# Patient Record
Sex: FEMALE | Race: WHITE | Employment: OTHER | URBAN - METROPOLITAN AREA
[De-identification: names, ages, dates, MRNs, and addresses within clinical notes are randomized per-mention and may not be internally consistent; named-entity substitution may affect disease eponyms.]

---

## 2017-10-12 ENCOUNTER — APPOINTMENT (OUTPATIENT)
Dept: CT IMAGING | Facility: CLINIC | Age: 72
End: 2017-10-12
Attending: EMERGENCY MEDICINE
Payer: COMMERCIAL

## 2017-10-12 ENCOUNTER — HOSPITAL ENCOUNTER (OUTPATIENT)
Facility: CLINIC | Age: 72
Setting detail: OBSERVATION
Discharge: NURSING, REHAB OR LONG-TERM CARE | End: 2017-11-01
Attending: EMERGENCY MEDICINE | Admitting: HOSPITALIST
Payer: COMMERCIAL

## 2017-10-12 DIAGNOSIS — B37.2 CANDIDIASIS OF SKIN: ICD-10-CM

## 2017-10-12 DIAGNOSIS — I10 BENIGN ESSENTIAL HYPERTENSION: ICD-10-CM

## 2017-10-12 DIAGNOSIS — Z79.4 TYPE 2 DIABETES MELLITUS WITH DIABETIC NEPHROPATHY, WITH LONG-TERM CURRENT USE OF INSULIN (H): ICD-10-CM

## 2017-10-12 DIAGNOSIS — I48.0 PAROXYSMAL ATRIAL FIBRILLATION (H): Primary | ICD-10-CM

## 2017-10-12 DIAGNOSIS — S01.01XA LACERATION OF SCALP, INITIAL ENCOUNTER: ICD-10-CM

## 2017-10-12 DIAGNOSIS — S09.90XA CLOSED HEAD INJURY, INITIAL ENCOUNTER: ICD-10-CM

## 2017-10-12 DIAGNOSIS — R62.7 ADULT FAILURE TO THRIVE: ICD-10-CM

## 2017-10-12 DIAGNOSIS — R41.0 CONFUSION: ICD-10-CM

## 2017-10-12 DIAGNOSIS — E11.21 TYPE 2 DIABETES MELLITUS WITH DIABETIC NEPHROPATHY, WITH LONG-TERM CURRENT USE OF INSULIN (H): ICD-10-CM
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
## 2017-10-12 LAB
ALBUMIN UR-MCNC: >=300 MG/DL
AMORPH CRY #/AREA URNS HPF: ABNORMAL /HPF
ANION GAP SERPL CALCULATED.3IONS-SCNC: 10 MMOL/L (ref 3–14)
APPEARANCE UR: ABNORMAL
BASOPHILS # BLD AUTO: 0 10E9/L (ref 0–0.2)
BASOPHILS NFR BLD AUTO: 0.2 %
BILIRUB UR QL STRIP: NEGATIVE
BUN SERPL-MCNC: 20 MG/DL (ref 7–30)
CALCIUM SERPL-MCNC: 8.9 MG/DL (ref 8.5–10.1)
CHLORIDE SERPL-SCNC: 104 MMOL/L (ref 94–109)
CO2 SERPL-SCNC: 24 MMOL/L (ref 20–32)
COLOR UR AUTO: YELLOW
CREAT SERPL-MCNC: 1.42 MG/DL (ref 0.52–1.04)
DIFFERENTIAL METHOD BLD: NORMAL
EOSINOPHIL # BLD AUTO: 0.2 10E9/L (ref 0–0.7)
EOSINOPHIL NFR BLD AUTO: 1.9 %
ERYTHROCYTE [DISTWIDTH] IN BLOOD BY AUTOMATED COUNT: 14 % (ref 10–15)
ETHANOL SERPL-MCNC: <0.01 G/DL
GFR SERPL CREATININE-BSD FRML MDRD: 36 ML/MIN/1.7M2
GLUCOSE SERPL-MCNC: 341 MG/DL (ref 70–99)
GLUCOSE UR STRIP-MCNC: >=1000 MG/DL
HCT VFR BLD AUTO: 41.5 % (ref 35–47)
HGB BLD-MCNC: 14.6 G/DL (ref 11.7–15.7)
HGB UR QL STRIP: ABNORMAL
HYALINE CASTS #/AREA URNS LPF: ABNORMAL /LPF
IMM GRANULOCYTES # BLD: 0 10E9/L (ref 0–0.4)
IMM GRANULOCYTES NFR BLD: 0.3 %
INR PPP: 0.97 (ref 0.86–1.14)
INTERPRETATION ECG - MUSE: NORMAL
KETONES UR STRIP-MCNC: NEGATIVE MG/DL
LEUKOCYTE ESTERASE UR QL STRIP: NEGATIVE
LYMPHOCYTES # BLD AUTO: 2 10E9/L (ref 0.8–5.3)
LYMPHOCYTES NFR BLD AUTO: 20.7 %
MCH RBC QN AUTO: 30.3 PG (ref 26.5–33)
MCHC RBC AUTO-ENTMCNC: 35.2 G/DL (ref 31.5–36.5)
MCV RBC AUTO: 86 FL (ref 78–100)
MONOCYTES # BLD AUTO: 0.5 10E9/L (ref 0–1.3)
MONOCYTES NFR BLD AUTO: 5 %
NEUTROPHILS # BLD AUTO: 6.8 10E9/L (ref 1.6–8.3)
NEUTROPHILS NFR BLD AUTO: 71.9 %
NITRATE UR QL: NEGATIVE
NON-SQ EPI CELLS #/AREA URNS LPF: ABNORMAL /LPF
NRBC # BLD AUTO: 0 10*3/UL
NRBC BLD AUTO-RTO: 0 /100
PH UR STRIP: 6 PH (ref 5–7)
PLATELET # BLD AUTO: 270 10E9/L (ref 150–450)
POTASSIUM SERPL-SCNC: 3.6 MMOL/L (ref 3.4–5.3)
RBC # BLD AUTO: 4.82 10E12/L (ref 3.8–5.2)
RBC #/AREA URNS AUTO: ABNORMAL /HPF
SODIUM SERPL-SCNC: 138 MMOL/L (ref 133–144)
SOURCE: ABNORMAL
SP GR UR STRIP: 1.02 (ref 1–1.03)
UROBILINOGEN UR STRIP-ACNC: 0.2 EU/DL (ref 0.2–1)
WBC # BLD AUTO: 9.4 10E9/L (ref 4–11)
WBC #/AREA URNS AUTO: ABNORMAL /HPF

## 2017-10-12 PROCEDURE — 81001 URINALYSIS AUTO W/SCOPE: CPT | Mod: XU | Performed by: EMERGENCY MEDICINE

## 2017-10-12 PROCEDURE — 87186 SC STD MICRODIL/AGAR DIL: CPT | Performed by: EMERGENCY MEDICINE

## 2017-10-12 PROCEDURE — 94640 AIRWAY INHALATION TREATMENT: CPT

## 2017-10-12 PROCEDURE — 85025 COMPLETE CBC W/AUTO DIFF WBC: CPT | Performed by: EMERGENCY MEDICINE

## 2017-10-12 PROCEDURE — 96360 HYDRATION IV INFUSION INIT: CPT

## 2017-10-12 PROCEDURE — 87086 URINE CULTURE/COLONY COUNT: CPT | Performed by: EMERGENCY MEDICINE

## 2017-10-12 PROCEDURE — 93005 ELECTROCARDIOGRAM TRACING: CPT

## 2017-10-12 PROCEDURE — 25000128 H RX IP 250 OP 636: Performed by: EMERGENCY MEDICINE

## 2017-10-12 PROCEDURE — 99285 EMERGENCY DEPT VISIT HI MDM: CPT | Mod: 25

## 2017-10-12 PROCEDURE — 80320 DRUG SCREEN QUANTALCOHOLS: CPT | Performed by: EMERGENCY MEDICINE

## 2017-10-12 PROCEDURE — 85610 PROTHROMBIN TIME: CPT | Performed by: EMERGENCY MEDICINE

## 2017-10-12 PROCEDURE — 99220 ZZC INITIAL OBSERVATION CARE,LEVL III: CPT | Performed by: HOSPITALIST

## 2017-10-12 PROCEDURE — 80048 BASIC METABOLIC PNL TOTAL CA: CPT | Performed by: EMERGENCY MEDICINE

## 2017-10-12 PROCEDURE — G0378 HOSPITAL OBSERVATION PER HR: HCPCS

## 2017-10-12 PROCEDURE — 87088 URINE BACTERIA CULTURE: CPT | Performed by: EMERGENCY MEDICINE

## 2017-10-12 PROCEDURE — 96361 HYDRATE IV INFUSION ADD-ON: CPT

## 2017-10-12 PROCEDURE — 90471 IMMUNIZATION ADMIN: CPT

## 2017-10-12 PROCEDURE — 90715 TDAP VACCINE 7 YRS/> IM: CPT | Performed by: EMERGENCY MEDICINE

## 2017-10-12 PROCEDURE — 12004 RPR S/N/AX/GEN/TRK7.6-12.5CM: CPT

## 2017-10-12 PROCEDURE — 70450 CT HEAD/BRAIN W/O DYE: CPT

## 2017-10-12 RX ORDER — ONDANSETRON 4 MG/1
4 TABLET, ORALLY DISINTEGRATING ORAL EVERY 6 HOURS PRN
Status: DISCONTINUED | OUTPATIENT
Start: 2017-10-12 | End: 2017-11-01 | Stop reason: HOSPADM

## 2017-10-12 RX ORDER — DEXTROSE MONOHYDRATE 25 G/50ML
25-50 INJECTION, SOLUTION INTRAVENOUS
Status: DISCONTINUED | OUTPATIENT
Start: 2017-10-12 | End: 2017-11-01 | Stop reason: HOSPADM

## 2017-10-12 RX ORDER — METOPROLOL TARTRATE 25 MG/1
75 TABLET, FILM COATED ORAL 2 TIMES DAILY
Status: DISCONTINUED | OUTPATIENT
Start: 2017-10-12 | End: 2017-10-26

## 2017-10-12 RX ORDER — ACETAMINOPHEN 650 MG/1
650 SUPPOSITORY RECTAL EVERY 4 HOURS PRN
Status: DISCONTINUED | OUTPATIENT
Start: 2017-10-12 | End: 2017-11-01 | Stop reason: HOSPADM

## 2017-10-12 RX ORDER — NALOXONE HYDROCHLORIDE 0.4 MG/ML
.1-.4 INJECTION, SOLUTION INTRAMUSCULAR; INTRAVENOUS; SUBCUTANEOUS
Status: DISCONTINUED | OUTPATIENT
Start: 2017-10-12 | End: 2017-11-01 | Stop reason: HOSPADM

## 2017-10-12 RX ORDER — ONDANSETRON 2 MG/ML
4 INJECTION INTRAMUSCULAR; INTRAVENOUS EVERY 6 HOURS PRN
Status: DISCONTINUED | OUTPATIENT
Start: 2017-10-12 | End: 2017-11-01 | Stop reason: HOSPADM

## 2017-10-12 RX ORDER — LISINOPRIL 10 MG/1
10 TABLET ORAL 2 TIMES DAILY
Status: DISCONTINUED | OUTPATIENT
Start: 2017-10-12 | End: 2017-10-13

## 2017-10-12 RX ORDER — NICOTINE POLACRILEX 4 MG
15-30 LOZENGE BUCCAL
Status: DISCONTINUED | OUTPATIENT
Start: 2017-10-12 | End: 2017-11-01 | Stop reason: HOSPADM

## 2017-10-12 RX ORDER — BISACODYL 10 MG
10 SUPPOSITORY, RECTAL RECTAL DAILY PRN
Status: DISCONTINUED | OUTPATIENT
Start: 2017-10-12 | End: 2017-11-01 | Stop reason: HOSPADM

## 2017-10-12 RX ORDER — LABETALOL HYDROCHLORIDE 5 MG/ML
10 INJECTION, SOLUTION INTRAVENOUS
Status: DISCONTINUED | OUTPATIENT
Start: 2017-10-12 | End: 2017-11-01 | Stop reason: HOSPADM

## 2017-10-12 RX ORDER — HYDRALAZINE HYDROCHLORIDE 20 MG/ML
5 INJECTION INTRAMUSCULAR; INTRAVENOUS
Status: DISCONTINUED | OUTPATIENT
Start: 2017-10-12 | End: 2017-11-01 | Stop reason: HOSPADM

## 2017-10-12 RX ORDER — ACETAMINOPHEN 325 MG/1
650 TABLET ORAL EVERY 4 HOURS PRN
Status: DISCONTINUED | OUTPATIENT
Start: 2017-10-12 | End: 2017-11-01 | Stop reason: HOSPADM

## 2017-10-12 RX ORDER — AMOXICILLIN 250 MG
1-2 CAPSULE ORAL 2 TIMES DAILY PRN
Status: DISCONTINUED | OUTPATIENT
Start: 2017-10-12 | End: 2017-11-01 | Stop reason: HOSPADM

## 2017-10-12 RX ADMIN — SODIUM CHLORIDE 1000 ML: 9 INJECTION, SOLUTION INTRAVENOUS at 21:39

## 2017-10-12 RX ADMIN — CLOSTRIDIUM TETANI TOXOID ANTIGEN (FORMALDEHYDE INACTIVATED), CORYNEBACTERIUM DIPHTHERIAE TOXOID ANTIGEN (FORMALDEHYDE INACTIVATED), BORDETELLA PERTUSSIS TOXOID ANTIGEN (GLUTARALDEHYDE INACTIVATED), BORDETELLA PERTUSSIS FILAMENTOUS HEMAGGLUTININ ANTIGEN (FORMALDEHYDE INACTIVATED), BORDETELLA PERTUSSIS PERTACTIN ANTIGEN, AND BORDETELLA PERTUSSIS FIMBRIAE 2/3 ANTIGEN 0.5 ML: 5; 2; 2.5; 5; 3; 5 INJECTION, SUSPENSION INTRAMUSCULAR at 20:52

## 2017-10-12 ASSESSMENT — ENCOUNTER SYMPTOMS: WOUND: 1

## 2017-10-12 NOTE — ED PROVIDER NOTES
History     Chief Complaint:  Fall    HPI   Amber Hull is a 72 year old diabetic female who presents via EMS after a fall. Patient fell and hit the right side of her head on a couple hooks on her dresser after tripping on her dog. She sustained this laceration this morning and her friend found her this afternoon and called 911. EMS states she s argumentative and appears confused.  EMS states that there was dried feces all around her room.  She states she does not take medicine for her diabetes and that she does not take any of her medications.  She states she does not take her medications because she does not like doctors. Patient denies neck pain, trouble urinating, urinary frequency and dysuria. She states she is pain free.  She is incontinent of urine and wears depends.    Per her friend Dayami whom the patient lives with, Dayami does not feel the patient is safe living at home and that she needs placement in a care facility. Dayami states she has been working on this for a month and has been in contact with  but does not feel that she has made any progress. She reports that the patient's bed sheets are always covered in feces and that the patient tried to put a feces-covered comforter in the dryer the other day without putting it in the washing machine first. She also reports that the patient fell a couple of weeks ago also. She states that a friend of Julio C came to the home yesterday but said that she wouldn't enter because the smell of stool was so strong. The paramedics also reported upon her arrival that there was feces everywhere all around the patient's room.     Allergies:  No known drug allergies.     Medications (patient states she is not currently taking any medications, the following are listed in her medical record):   Metoprolol   Tylenol  Miralax  Eliquis  Lantus  Lisinopril  Atorvastatin     Past Medical History:    Diabetes  Endometrial hyperplasia  Endometriosis  "  Ganglion  Ovarian cyst  Septicemia due to group B streptococcus  Paroxysmal A-Fib  Hydronephrosis  HTN  HLD  Renal failure    Past Surgical History:    Cholecystectomy   Total hysterectomy  Colonoscopy  Bunion correction  D & C  Excise primary ganglion wrist  Carpal tunnel surgery  Tooth extraction  Lithotripsy with stent implant     Family History:    History reviewed. No pertinent family history.    Social History:  Marital Status:   Presents to the ED via EMS.  Tobacco Use: Never  Alcohol Use: No     Review of Systems   Unable to perform ROS: Mental status change   Skin: Positive for wound.       Physical Exam   First Vitals:  BP: (!) 151/92  Heart Rate: 103  Temp: 97.9  F (36.6  C)  Resp: 18  Height: 165.1 cm (5' 5\")  Weight: 90.7 kg (200 lb)  SpO2: 96 %       Physical Exam  Nursing note and vitals reviewed.  Constitutional:  Appears well-developed and well-nourished.  She has dried feces on her feet.  HENT:   Head:    Large 10 cm linear full thickness laceration extending vertically up the proximal forehead up the right parietal scalp.  The laceration extends to the skull, but the skull is intact.  Dried blood in the wound, but no active bleeding.  TM's cear.   Mouth/Throat:   Oropharynx is clear and moist. No oropharyngeal exudate.   Eyes:    Pupils are equal, round, and reactive to light.   Neck:    Normal range of motion of the neck without pain. Neck supple. Non tender midline over the cervical spines.  No stepoff     No tracheal deviation present. No thyromegaly present.   Cardiovascular:  Normal rate, regular rhythm, no murmur   Pulmonary/Chest: Breath sounds are clear and equal without wheezes or crackles.  Non tender ribs and clavicles.  Abdominal:   Soft. Bowel sounds are normal. Exhibits no distension and      no mass. There is no tenderness.      There is no rebound and no guarding.   Musculoskeletal:  Exhibits no edema.  Non tender arms, legs and back.  Lymphadenopathy:  No cervical " adenopathy.   Neurological:   Alert and oriented to person, place, but not time.  GCS 15.  CN 2-12 intact.  and proximal upper extremity strength strong and equal.  Bilateral lower extremity strength strong and equal, including strong dorsiflexion and plantarflexion strength.  Sensation intact and equal to the face, arms and legs.  No facial droop or weakness. Normal speech.  Follows commands and answers questions normally.    Skin:    Skin is warm and dry. No rash noted. No pallor.       Emergency Department Course     EKG:  @ 2147  Indication: Fall  Vent. Rate 85 bpm. LA interval 152 ms. QRS duration 76 ms. QT/QTc 398/473 ms. P-R-T axis 65 38 115.   Normal sinus rhythm. Nonspecific ST and T wave abnormality. Prolonged QT. Abnormal ECG.    Read @ 2154 by Dr. Chang.    Imaging:  Head CT, without contrast, per radiology:   IMPRESSION:   1. No intracranial hemorrhage or skull fractures are seen.  2. Age related changes including diffuse brain atrophy.  White matter  changes consistent with small vessel ischemic disease.    Radiographic findings were communicated with the patient who voiced understanding of the findings.    Laboratory:  CBC:  WBC 9.4, HGB 14.5, , otherwise WNL  BMP: Creatinine 1.2 (H), GFR 36 (L), glucose 341 (H), otherwise WNL  INR: 0.97  Alcohol blood level: <0.01  UA: Slightly cloudy yellow urine, urineglc >=1000, small blood, protein >= 300, otherwise WNL  UA, Microscopic: WBC 2-5, RBC 0-2 , many epithelial cells, moderate amorphous crystals     Procedures:     Laceration Repair      LACERATION:  A gaping, full thickness 10 cm laceration to the skull.     LOCATION:  Forehead extending into right parietal scalp.     FUNCTION:  Distally sensation and circulation are intact.    ANESTHESIA:  Local using 6 ccs of 1% lidocaine with epinephrine     PREPARATION:  Irrigation with Normal Saline.    DEBRIDEMENT:  no debridement.    CLOSURE:  Wound was closed with One Layer.  Skin closed with 5 x  5.0 Prolene using interrupted sutures to the forehead portion of the laceration.     Skin closed with 17 staples in the scalp portion of the laceration.     Interventions:  2052: Tdap, 0.5 mL, IM injection  2139: Normal Saline, 1 liter, IV bolus     Emergency Department Course:  Nursing notes and vitals reviewed.  I performed an exam of the patient as documented above.  The above workup was undertaken.  2019: I rechecked the patient and discussed results.   2050: I performed the laceration; see procedure note above.   Findings and plan explained to the patient's friend and caretaker who consents to admission.   2210: I discussed the patient with Dr. Peres of the hospitalist service, who will admit the patient to an obs bed for further monitoring, evaluation, and treatment..     Impression & Plan      Medical Decision Making:  This patient arrives with a closed head injury with a large full thickness scalp laceration which I repaired with sutures and staples. Head CT did not show any intercranial bleed or skull fracture. There is no sign of active infection or significant blood loss. However, the patient has been having progressive confusion which is possibly due to undiagnosed dementia. Her friend Dayami who she lives with does not feel safe for her to go home. Patient has been living in feces all around her room and paramedics state that they also feel she is in an unsafe living environment. Patient was initially resistant and wanting to go home but ultimately agreed to admission. She will likely need care facility placement and  consult. She has not been taking any of her medications for some time. She will be admitted under the care of Dr. Peres of the hospitalist service.     Diagnosis:    ICD-10-CM    1. Closed head injury, initial encounter S09.90XA    2. Laceration of scalp, initial encounter S01.01XA    3. Confusion R41.0    4. Adult failure to thrive R62.7        Disposition:  Admitted to  observation.         I, Meron Meraz, am serving as a scribe on 10/12/2017 at 6:40 PM to personally document services performed by Dr. Chang based on my observations and the provider's statements to me.    EMERGENCY DEPARTMENT       Xuan Chang MD  10/12/17 6552

## 2017-10-12 NOTE — IP AVS SNAPSHOT
` ` Patient Information     Patient Name Sex     Kurtis Amber ARMINDA ([id number]) Female 1945       Room Bed    88 [id number]      Patient Demographics     Address Phone    [address] [place] [address] [phone] (Home)  none (Work)  [phone] (Mobile)      Patient Ethnicity & Race     Ethnic Group Patient Race    American White      Emergency Contact(s)     Name Relation Home Work Mobile    Ludmila Borges  HC POA Other [phone]      Dayami Muñoz  Friend [phone]     Tasia Olivares Friend [phone] none none      Documents on File        Status Date Received Description       Documents for the Patient    Privacy Notice - [place] Received 16     Face Sheet  10/31/07     Insurance Card       Power of  Not Received  *-* Power of  *-*    Advance Directives and Living Will Not Received  *-*  Living Will *-*    Consent for EHR Access Received 16     External Medication Information Consent       Patient ID       [facility] Specified Other       Consent for Services/Privacy Notice - Hospital/Clinic Received () 16     HIM JEFFERSON Authorization  16     Consent for Services/Privacy Notice - Hospital/Clinic Received 10/13/17     Care Everywhere Prospective Auth          Documents for the Encounter    CMS IM for Patient Signature       EMS/Ambulance Record  10/12/17 [place] FIRE DEPARTMENT    CMS MATUTE for Patient Signature Received 10/14/17 unable to do, see note      Admission Information     Attending Provider Admitting Provider Admission Type Admission Date/Time    Inessa Barton MD Kharel, Tirtha R, MD Emergency 10/12/17  1825    Discharge Date Hospital Service Auth/Cert Status Service Area     Hospitalist [facility]    Unit Room/Bed Admission Status       [address] 88/88- Admission (Confirmed)       Admission     Complaint    Fall      Hospital Account     Name Acct ID Class Status Primary Coverage    Amber Hull ARMINDA  [id number] Observation Open HUMANA - HU[employer]NA MEDICARE ADVANTAGE            Guarantor Account (for Hospital Account #[id number])     Name Relation to Pt Service Area Active? Acct Type    Amber Hull  FCS Yes Personal/Family    Address Phone          [address] [place] [address] [phone](H)  none(O)              Coverage Information (for Hospital Account #[id number])     F/O Payor/Plan Precert #    HUMANA/HUMANA MEDICARE ADVANTAGE     Subscriber Subscriber #    Amber Hull [id number]    Address Phone    [address] [address]  [place] [address]

## 2017-10-12 NOTE — IP AVS SNAPSHOT
"[place] [address] ONCOLOGY: [phone]                                              INTERAGENCY TRANSFER FORM - PHYSICIAN ORDERS   10/12/2017                    Hospital Admission Date: 10/12/2017  ELSA BISHOP   : 1945  Sex: Female        Attending Provider: Inessa Barton MD     Allergies:  No Known Allergies    Infection:  None   Service:  HOSPITALIST    Ht:  1.651 m (5' 5\")   Wt:  94.1 kg (207 lb 6.4 oz)   Admission Wt:  90.7 kg (200 lb)    BMI:  34.51 kg/m 2   BSA:  2.08 m 2            Patient PCP Information     Provider PCP Type    [facility]    Tirso LIGIA Randle, OD, OD Ophthalmology      ED Clinical Impression     Diagnosis Description Comment Added By Time Added    Closed head injury, initial encounter [S09.90XA] Closed head injury, initial encounter [S09.90XA]  Xuan Chang MD 10/12/2017 10:04 PM    Laceration of scalp, initial encounter [S01.01XA] Laceration of scalp, initial encounter [S01.01XA]  Xuan Chang MD 10/12/2017 10:04 PM    Confusion [R41.0] Confusion [R41.0]  Xuan Chang MD 10/12/2017 10:04 PM    Adult failure to thrive [R62.7] Adult failure to thrive [R62.7]  Xuan Chang MD 10/12/2017 10:04 PM      Hospital Problems as of 2017              Priority Class Noted POA    Fall Medium  10/12/2017 Yes      Non-Hospital Problems as of 2017              Priority Class Noted    Pain in joint, multiple sites Medium  2007    Obesity Medium  2007    Type 2 diabetes mellitus with diabetic nephropathy (H) Medium  2016    Benign essential hypertension Medium  2016    Mixed hyperlipidemia Medium  2016    Paroxysmal atrial fibrillation (H) Medium  2016    Ureteral stone Medium  2016    Hydronephrosis, unspecified hydronephrosis type Medium  2016    Acute kidney failure (H) Medium  2016    Ureteral calculus Medium  2016    SIRS (systemic inflammatory response syndrome) (H) Medium  " 5/28/2016    Septicemia due to group B Streptococcus (H) High  5/29/2016      Code Status History     Date Active Date Inactive Code Status Order ID Comments User Context    11/1/2017  8:37 AM  Full Code [id number]  Inessa Barton MD Outpatient    10/12/2017 11:32 PM 11/1/2017  8:37 AM Full Code [id number]  Rj Peres MD ED    6/1/2016  1:05 PM 10/12/2017 11:32 PM DNR [id number]  Dg Collazo MD Outpatient    5/28/2016  1:57 PM 6/1/2016  1:05 PM DNR [id number]  Jc Cowan MD Inpatient         Medication Review      START taking        Dose / Directions Comments    amLODIPine 10 MG tablet   Commonly known as:  NORVASC   Used for:  Benign essential hypertension        Dose:  10 mg   Take 1 tablet (10 mg) by mouth daily   Quantity:  30 tablet   Refills:  0        aspirin 325 MG EC tablet   Used for:  Paroxysmal atrial fibrillation (H)        Dose:  325 mg   Take 1 tablet (325 mg) by mouth daily   Quantity:  40 tablet   Refills:  0        guanFACINE 2 MG tablet   Commonly known as:  TENEX   Used for:  Benign essential hypertension        Dose:  2 mg   Take 1 tablet (2 mg) by mouth daily   Refills:  0        hydrALAZINE 10 MG tablet   Commonly known as:  APRESOLINE   Used for:  Benign essential hypertension        Dose:  10 mg   Take 1 tablet (10 mg) by mouth 3 times daily   Quantity:  60 tablet   Refills:  0        hydrochlorothiazide 12.5 MG capsule   Commonly known as:  MICROZIDE   Used for:  Benign essential hypertension        Dose:  12.5 mg   Take 1 capsule (12.5 mg) by mouth daily   Quantity:  30 capsule   Refills:  0        * insulin aspart 100 UNIT/ML injection   Commonly known as:  NovoLOG PEN   Used for:  Type 2 diabetes mellitus with diabetic nephropathy, with long-term current use of insulin (H)        Dose:  9 Units   Inject 9 Units Subcutaneous 3 times daily (with meals)   Refills:  0        * insulin aspart 100 UNIT/ML injection   Commonly known as:  NovoLOG PEN   Used for:  Type  2 diabetes mellitus with diabetic nephropathy, with long-term current use of insulin (H)        Dose:  1-7 Units   Inject 1-7 Units Subcutaneous 3 times daily (before meals)   Refills:  0        miconazole 2 % powder   Commonly known as:  MICATIN; MICRO GUARD   Used for:  Candidiasis of skin        Apply topically every hour as needed for other (topical candidiasis)   Refills:  0        * Notice:  This list has 2 medication(s) that are the same as other medications prescribed for you. Read the directions carefully, and ask your doctor or other care provider to review them with you.      CONTINUE these medications which may have CHANGED, or have new prescriptions. If we are uncertain of the size of tablets/capsules you have at home, strength may be listed as something that might have changed.        Dose / Directions Comments    insulin glargine 100 UNIT/ML injection   Commonly known as:  LANTUS   This may have changed:    - how much to take  - when to take this  - additional instructions   Used for:  Type 2 diabetes mellitus with diabetic nephropathy, with long-term current use of insulin (H)        Dose:  37 Units   Inject 37 Units Subcutaneous At Bedtime   Refills:  0          CONTINUE these medications which have NOT CHANGED        Dose / Directions Comments    acetaminophen 325 MG tablet   Commonly known as:  TYLENOL        Dose:  650 mg   Take 650 mg by mouth 3 times daily as needed for mild pain   Refills:  0        ATORVASTATIN CALCIUM PO        Dose:  10 mg   Take 10 mg by mouth daily   Refills:  0        polyethylene glycol powder   Commonly known as:  MIRALAX/GLYCOLAX        Dose:  17 g   Take 17 g by mouth daily as needed for constipation   Refills:  0          STOP taking     apixaban ANTICOAGULANT 5 MG tablet   Commonly known as:  ELIQUIS           LISINOPRIL PO           Metoprolol Tartrate 75 MG Tabs                   Summary of Visit     Reason for your hospital stay       You were admitted to the  hospital secondary to confusion.             After Care     Activity - Up ad angie           Additional Discharge Instructions       yopur blood pressure medications are changed . You are not taking lisinopril and metoprolol any more.  You are discharged on Norvasc, HCTZ and hydralazine along with Bumex .  Your Lantus is increased to 37 units from 30 units.  You are off the Apixaban and will be taking full dose ASA for atrial fibrillation .       Advance Diet as Tolerated       Follow this diet upon discharge: Orders Placed This Encounter      Room Service      Moderate Consistent CHO Diet       Fall precautions           General info for SNF       Length of Stay Estimate: Long Term Care  Condition at Discharge: Improving  Level of care:skilled   Rehabilitation Potential: Good  Admission H&P remains valid and up-to-date: Yes  Recent Chemotherapy: N/A  Use Nursing Home Standing Orders: Yes       Glucose monitor nursing POCT       Before meals and at bedtime       Mantoux instructions       Give two-step Mantoux (PPD) Per Facility Policy Yes             Referrals     Physical Therapy Adult Consult       Evaluate and treat as clinically indicated.    Reason: debilitation and balance issues , risk of fall.             Statement of Approval     Ordered          11/01/17 0837  I have reviewed and agree with all the recommendations and orders detailed in this document.  EFFECTIVE NOW     Approved and electronically signed by:  Inessa Barton MD

## 2017-10-12 NOTE — IP AVS SNAPSHOT
` [place] [id number] ONCOLOGY: [phone]            Medication Administration Report for Amber Hull as of 11/01/17 1231   Legend:    Given Hold Not Given Due Canceled Entry Other Actions    Time Time (Time) Time  Time-Action       Inactive    Active    Linked        Medications 10/26/17 10/27/17 10/28/17 10/29/17 10/30/17 10/31/17 11/01/17    acetaminophen (TYLENOL) Suppository 650 mg  Dose: 650 mg Freq: EVERY 4 HOURS PRN Route: RE  PRN Reason: mild pain  Start: 10/12/17 2332   Admin Instructions: Alternate ibuprofen (if ordered) with acetaminophen.  Maximum acetaminophen dose from all sources = 75 mg/kg/day not to exceed 4 grams/day.               acetaminophen (TYLENOL) tablet 650 mg  Dose: 650 mg Freq: EVERY 4 HOURS PRN Route: PO  PRN Reason: mild pain  Start: 10/12/17 2332   Admin Instructions: Alternate ibuprofen (if ordered) with acetaminophen.  Maximum acetaminophen dose from all sources = 75 mg/kg/day not to exceed 4 grams/day.               amLODIPine (NORVASC) tablet 10 mg  Dose: 10 mg Freq: DAILY Route: PO  Start: 10/21/17 0900   Admin Instructions: Hold for SBP < 100     0817 (10 mg)-Given        0808 (10 mg)-Given        0820 (10 mg)-Given        0815 (10 mg)-Given        0821 (10 mg)-Given        0800 (10 mg)-Given        0823 (10 mg)-Given           aspirin EC EC tablet 325 mg  Dose: 325 mg Freq: DAILY Route: PO  Start: 10/17/17 1130   Admin Instructions: DO NOT CRUSH.     0817 (325 mg)-Given        0808 (325 mg)-Given        0820 (325 mg)-Given        0815 (325 mg)-Given        0821 (325 mg)-Given        0800 (325 mg)-Given        0822 (325 mg)-Given           bisacodyl (DULCOLAX) Suppository 10 mg  Dose: 10 mg Freq: DAILY PRN Route: RE  PRN Reason: constipation  Start: 10/12/17 2332   Admin Instructions: Hold for loose stools.  This is the third step of a three step constipation treatment protocol.               cloNIDine (CATAPRES) tablet 0.1 mg  Dose: 0.1 mg Freq: EVERY 4 HOURS  PRN Route: PO  PRN Comment: SBP > 180; DBP >105  Start: 10/16/17 2224              glucose 40 % gel 15-30 g  Dose: 15-30 g Freq: EVERY 15 MIN PRN Route: PO  PRN Reason: low blood sugar  Start: 10/12/17 2332   Admin Instructions: Give 15 g for BG 51 to 69 mg/dL IF patient is conscious and able to swallow. Give 30 g for BG less than or equal to 50 mg/dL IF patient is conscious and able to swallow. Do NOT give glucose gel via enteral tube.  IF patient has enteral tube: give apple juice 120 mL (4 oz or 15 g of CHO) via enteral tube for BG 51 to 69 mg/dL.  Give apple juice 240 mL (8 oz or 30 g of CHO) via enteral tube for BG less than or equal to 50 mg/dL.    ~Oral gel is preferable for conscious and able to swallow patient.   ~IF gel unavailable or patient refuses may provide apple juice 120 mL (4 oz or 15 g of CHO). Document juice on I and O flowsheet.              Or  dextrose 50 % injection 25-50 mL  Dose: 25-50 mL Freq: EVERY 15 MIN PRN Route: IV  PRN Reason: low blood sugar  Start: 10/12/17 2332   Admin Instructions: Use if have IV access, BG less than 70 mg/dL and meet dose criteria below:  Dose if conscious and alert (or disorientated) and NPO = 25 mL  Dose if unconscious / not alert = 50 mL  Vesicant.              Or  glucagon injection 1 mg  Dose: 1 mg Freq: EVERY 15 MIN PRN Route: SC  PRN Reason: low blood sugar  PRN Comment: May repeat x 1 only  Start: 10/12/17 2332   Admin Instructions: May give SQ or IM. ONLY use glucagon IF patient has NO IV access AND is UNABLE to swallow AND blood glucose is LESS than or EQUAL to 50 mg/dL.               guanFACINE (TENEX) tablet 2 mg  Dose: 2 mg Freq: DAILY Route: PO  Start: 10/26/17 0900    0817 (2 mg)-Given        0808 (2 mg)-Given        0820 (2 mg)-Given        0815 (2 mg)-Given        0820 (2 mg)-Given        0800 (2 mg)-Given        0822 (2 mg)-Given           hydrALAZINE (APRESOLINE) injection 5 mg  Dose: 5 mg Freq: EVERY 2 HOURS PRN Route: IV  PRN Reason: high  blood pressure  PRN Comment: give for SBP > 180 or DBP>100  Start: 10/12/17 2332              hydrALAZINE (APRESOLINE) tablet 10 mg  Dose: 10 mg Freq: 3 TIMES DAILY Route: PO  Start: 10/30/17 1600   Admin Instructions: Hold for systolic BP less than 120         1604 (10 mg)-Given       2104 (10 mg)-Given        0800 (10 mg)-Given       1639 (10 mg)-Given       2221 (10 mg)-Given        0823 (10 mg)-Given       [ ] 1600       [ ] 2200           hydrochlorothiazide (MICROZIDE) capsule 12.5 mg  Dose: 12.5 mg Freq: DAILY Route: PO  Start: 10/28/17 0900   Admin Instructions: Hold for sbp<100       0820 (12.5 mg)-Given        0815 (12.5 mg)-Given        0821 (12.5 mg)-Given        0800 (12.5 mg)-Given        0823 (12.5 mg)-Given           insulin aspart (NovoLOG) inj (RAPID ACTING)  Dose: 9 Units Freq: 3 TIMES DAILY WITH MEALS Route: SC  Start: 10/29/17 1200   Admin Instructions: If given at mealtime, must be administered 5 min before meal or immediately after.        1159 (9 Units)-Given       1740 (9 Units)-Given        0822 (9 Units)-Given       1246 (9 Units)-Given       1740 (9 Units)-Given        0853 (9 Units)-Given       1259 (9 Units)-Given       1741 (9 Units)-Given        0824 (9 Units)-Given       1210 (9 Units)-Given [C]       [ ] 1800           insulin aspart (NovoLOG) inj (RAPID ACTING)  Dose: 1-5 Units Freq: AT BEDTIME Route: SC  Start: 10/16/17 2200   Admin Instructions: MEDIUM INSULIN RESISTANCE DOSING    Do Not give Bedtime Correction Insulin if BG less than  200.   For  - 249 give 1 units.   For  - 299 give 2 units.   For  - 349 give 3 units.   For  -399 give 4 units.   For BG greater than or equal to 400 give 5 units.  Notify provider if glucose greater than or equal to 350 mg/dL after administration of correction dose.  If given at mealtime, must be administered 5 min before meal or immediately after.     (2159)-Not Given        (2128)-Not Given        2233 (1 Units)-Given         2152 (1 Units)-Given        2104 (1 Units)-Given [C]        (2334)-Not Given        [ ] 2200           insulin aspart (NovoLOG) inj (RAPID ACTING)  Dose: 1-7 Units Freq: 3 TIMES DAILY BEFORE MEALS Route: SC  Start: 10/16/17 1200   Admin Instructions: Correction Scale - MEDIUM INSULIN RESISTANCE DOSING     Do Not give Correction Insulin if Pre-Meal BG less than 140.   For Pre-Meal  - 189 give 1 unit.   For Pre-Meal  - 239 give 2 units.   For Pre-Meal  - 289 give 3 units.   For Pre-Meal  - 339 give 4 units.   For Pre-Meal - 399 give 5 units.   For Pre-Meal -449 give 6 units  For Pre-Meal BG greater than or equal to 450 give 7 units.   To be given with prandial insulin, and based on pre-meal blood glucose.    Notify provider if glucose greater than or equal to 350 mg/dL after administration of correction dose.  If given at mealtime, must be administered 5 min before meal or immediately after.     0818 (1 Units)-Given       1227 (2 Units)-Given       1746 (2 Units)-Given        (0807)-Not Given       1252 (2 Units)-Given       1740 (2 Units)-Given        (0811)-Not Given       1233 (3 Units)-Given       1730 (2 Units)-Given        (0746)-Not Given [C]       1159 (1 Units)-Given [C]       (1731)-Not Given [C]        (0817)-Not Given [C]       1246 (1 Units)-Given [C]       1741 (1 Units)-Given [C]        (0800)-Not Given [C]       1259 (2 Units)-Given [C]       1742 (1 Units)-Given        (0819)-Not Given [C]       1211 (1 Units)-Given       [ ] 1700           insulin glargine (LANTUS) injection 37 Units  Dose: 37 Units Freq: AT BEDTIME Route: SC  Start: 10/26/17 2200    2154 (37 Units)-Given        2127 (37 Units)-Given        2233 (37 Units)-Given        2152 (37 Units)-Given        2104 (37 Units)-Given        2221 (37 Units)-Given        [ ] 2200           labetalol (NORMODYNE/TRANDATE) injection 10 mg  Dose: 10 mg Freq: EVERY 2 HOURS PRN Route: IV  PRN Reason: high blood  pressure  PRN Comment: give for SBP > 170 or DBP>95  Start: 10/12/17 2332   Admin Instructions: Hold for HR < 60               miconazole (MICATIN; MICRO GUARD) 2 % powder  Freq: EVERY 1 HOUR PRN Route: Top  PRN Reason: other  PRN Comment: topical candidiasis  Start: 10/15/17 1430   Admin Instructions: Apply to affected area.        1314 ( )-Given       1926 ( )-Given        0535 ( )-Given       2233 ( )-Given        0644 ( )-Given        1101 ( )-Given       2110 ( )-Given        0854 ( )-Given            naloxone (NARCAN) injection 0.1-0.4 mg  Dose: 0.1-0.4 mg Freq: EVERY 2 MIN PRN Route: IV  PRN Reason: opioid reversal  Start: 10/12/17 2332   Admin Instructions: For respiratory rate LESS than or EQUAL to 8.  Partial reversal dose:  0.1 mg titrated q 2 minutes for Analgesia Side Effects Monitoring Sedation Level of 3 (frequently drowsy, arousable, drifts to sleep during conversation).Full reversal dose:  0.4 mg bolus for Analgesia Side Effects Monitoring Sedation Level of 4 (somnolent, minimal or no response to stimulation).               OLANZapine (zyPREXA) injection 10 mg  Dose: 10 mg Freq: EVERY 4 HOURS PRN Route: IM  PRN Reasons: agitation,aggression  Start: 10/13/17 1336   Admin Instructions: Dissolve the contents of the 10 mg vial using 2.1 mL of Sterile Water for Injection to provide a solution containing 5 mg/mL of olanzapine. Withdraw the ordered dose from vial. Use immediately (within 1 hour) after reconstitution. Discard any unused portion.               OLANZapine zydis (zyPREXA) ODT tab 10 mg  Dose: 10 mg Freq: EVERY 4 HOURS PRN Route: PO  PRN Reasons: agitation,aggression  Start: 10/13/17 1335   Admin Instructions: Combined IM and PO doses may significantly increase the risk of orthostatic hypotension at 30 mg per day or higher.  With dry hands, peel back foil backing and gently remove tablet; do not push oral disintegrating tablet through foil backing; administer immediately on tongue and oral  "disintegrating tablet dissolves in seconds; then swallow with saliva; liquid not required.               ondansetron (ZOFRAN-ODT) ODT tab 4 mg  Dose: 4 mg Freq: EVERY 6 HOURS PRN Route: PO  PRN Reasons: nausea,vomiting  Start: 10/12/17 2332   Admin Instructions: This is Step 1 of nausea and vomiting management.  If nausea not resolved in 15 minutes, go to Step 2 prochlorperazine (COMPAZINE). Do not push through foil backing. Peel back foil and gently remove. Place on tongue immediately. Administration with liquid unnecessary              Or  ondansetron (ZOFRAN) injection 4 mg  Dose: 4 mg Freq: EVERY 6 HOURS PRN Route: IV  PRN Reasons: nausea,vomiting  Start: 10/12/17 2332   Admin Instructions: This is Step 1 of nausea and vomiting management.  If nausea not resolved in 15 minutes, go to Step 2 prochlorperazine (COMPAZINE).  Irritant.               QUEtiapine (SEROquel) tablet 50 mg  Dose: 50 mg Freq: EVERY 2 HOURS PRN Route: PO  PRN Comment: Agitation  Start: 10/13/17 1334              senna-docusate (SENOKOT-S;PERICOLACE) 8.6-50 MG per tablet 1-2 tablet  Dose: 1-2 tablet Freq: 2 TIMES DAILY PRN Route: PO  PRN Comment: constipation   Start: 10/12/17 2332   Admin Instructions: If no bowel movement in 24 hours, increase to 2 tablets PO BID.  Hold for loose stools.   This is the first step of a three step constipation treatment protocol.               traMADol (ULTRAM) half-tab 25 mg  Dose: 25 mg Freq: EVERY 6 HOURS PRN Route: PO  PRN Reason: moderate pain  Start: 10/12/17 2332             Discontinued Medications  Medications 10/26/17 10/27/17 10/28/17 10/29/17 10/30/17 10/31/17 11/01/17         Freq: EVERY 1 HOUR PRN Route: Top  PRN Reason: pain  PRN Comment: with VAD insertion or accessing implanted port.  Start: 10/24/17 0855   End: 10/29/17 1703   Admin Instructions: Do NOT give if patient has a history of allergy to any local anesthetic or any \"terry\" product.  Apply 30 minutes prior to VAD insertion or port " "access. MAX Dose: 2.5 g (  of 5 g tube).        1703-Med Discontinued            Dose: 1 mL Freq: EVERY 1 HOUR PRN Route: OTHER  PRN Comment: mild pain with VAD insertion or accessing implanted port  Start: 10/24/17 0855   End: 10/29/17 1703   Admin Instructions: Do NOT give if patient has a history of allergy to any local anesthetic or any \"terry\" product. MAX dose 1 mL subcutaneous OR intradermal in divided doses.        1703-Med Discontinued            Dose: 3 mL Freq: EVERY 8 HOURS Route: IK  Start: 10/24/17 0900   End: 10/29/17 1703   Admin Instructions: And Q1H PRN, to lock peripheral IV dormant line.     0221 (3 mL)-Given [C]       0817 (3 mL)-Given       1745 (3 mL)-Given        (0224)-Not Given       0809 (3 mL)-Given       1741 (3 mL)-Given        0524 (3 mL)-Given       0821 (3 mL)-Given       1731 (3 mL)-Given        0533 (3 mL)-Given       0814 (3 mL)-Given       1703-Med Discontinued                  Dose: 3 mL Freq: EVERY 1 HOUR PRN Route: IK  PRN Reason: line flush  Start: 10/24/17 0855   End: 10/29/17 1703   Admin Instructions: for peripheral IV flush post IV meds        1703-Med Discontinued            "

## 2017-10-12 NOTE — IP AVS SNAPSHOT
` `     [facility] 88 ONCOLOGY: [phone]                 INTERAGENCY TRANSFER FORM - NOTES (H&P, Discharge Summary, Consults, Procedures, Therapies)   10/12/2017                    Hospital Admission Date: 10/12/2017  ELSA BISHOP   : 1945  Sex: Female        Patient PCP Information     Provider PCP Type    [facility]    Tirso LIGIA Randle, OD, OD Ophthalmology      History & Physicals     No notes of this type exist for this encounter.      Discharge Summaries     No notes of this type exist for this encounter.         Consult Notes      Consults by Ubaldo Beckford MD at 10/17/2017 11:11 AM     Author:  Ubaldo Beckford MD Service:  Psychiatry Author Type:  Physician    Filed:  10/17/2017  1:29 PM Date of Service:  10/17/2017 11:11 AM Creation Time:  10/17/2017 11:11 AM    Status:  Signed :  Ubaldo Beckford MD (Physician)         [facility] Psychiatric Consult Follow-up Note      TIME SPENT IN PSYCHIATRY CONSULT: 15 MINUTES.     Interim History     The patient's care was discussed, patient seen and chart notes were reviewed. Pt examined on 88. Tolerating medications without side effects. Side effects, risks, and benefits of medications reviewed with patient. Patient is currently negative for suicide ideation, negative for plan or intent, able to contract no self harm and identify barriers to suicide.  Negative for obsessions, compulsions or psychosis.  The  discovered that the Pt has a healthcare POA named Ludmila Borges. This means[TW1.1] commitment is not necessary. She is somewhat calmer and more organized today, but remains confused and disoriented. Seroquel is clearly helping reduce her anxiety and agitation. She does not appear to need geriatric psychiatry, though she will need a greater level of care than can be provided in her home at this point[TW1.2]. Social work is considering a referral to a nursing home or TCU. Pt reports  she is agreeable to this.[TW1.3]      Medications     Current Facility-Administered Medications Ordered in Epic   Medication Dose Route Frequency Last Rate Last Dose     amLODIPine (NORVASC) tablet 10 mg  10 mg Oral Daily   10 mg at 10/17/17 1014     insulin aspart (NovoLOG) inj (RAPID ACTING)  1-7 Units Subcutaneous TID AC         insulin aspart (NovoLOG) inj (RAPID ACTING)  1-5 Units Subcutaneous At Bedtime         cloNIDine (CATAPRES) tablet 0.1 mg  0.1 mg Oral Q4H PRN         insulin glargine (LANTUS) injection 35 Units  35 Units Subcutaneous At Bedtime   35 Units at 10/16/17 2245     miconazole (MICATIN; MICRO GUARD) 2 % powder   Topical Q1H PRN         lisinopril (PRINIVIL/ZESTRIL) tablet 20 mg  20 mg Oral BID   20 mg at 10/17/17 1014     QUEtiapine (SEROquel) tablet 50 mg  50 mg Oral Q2H PRN   50 mg at 10/16/17 1626     OLANZapine zydis (zyPREXA) ODT tab 10 mg  10 mg Oral Q4H PRN   10 mg at 10/13/17 1357     OLANZapine (zyPREXA) injection 10 mg  10 mg Intramuscular Q4H PRN         metoprolol (LOPRESSOR) tablet 75 mg  75 mg Oral BID   75 mg at 10/17/17 1014     naloxone (NARCAN) injection 0.1-0.4 mg  0.1-0.4 mg Intravenous Q2 Min PRN         acetaminophen (TYLENOL) tablet 650 mg  650 mg Oral Q4H PRN         acetaminophen (TYLENOL) Suppository 650 mg  650 mg Rectal Q4H PRN         senna-docusate (SENOKOT-S;PERICOLACE) 8.6-50 MG per tablet 1-2 tablet  1-2 tablet Oral BID PRN         bisacodyl (DULCOLAX) Suppository 10 mg  10 mg Rectal Daily PRN         ondansetron (ZOFRAN-ODT) ODT tab 4 mg  4 mg Oral Q6H PRN        Or     ondansetron (ZOFRAN) injection 4 mg  4 mg Intravenous Q6H PRN         traMADol (ULTRAM) half-tab 25 mg  25 mg Oral Q6H PRN         glucose 40 % gel 15-30 g  15-30 g Oral Q15 Min PRN        Or     dextrose 50 % injection 25-50 mL  25-50 mL Intravenous Q15 Min PRN        Or     glucagon injection 1 mg  1 mg Subcutaneous Q15 Min PRN         labetalol (NORMODYNE/TRANDATE) injection 10 mg  10 mg  "Intravenous Q2H PRN   10 mg at 10/13/17 0208     hydrALAZINE (APRESOLINE) injection 5 mg  5 mg Intravenous Q2H PRN         No current Epic-ordered outpatient prescriptions on file.         Allergies      Allergies   Allergen Reactions     No Known Allergies         Medical Review of Systems     /66 (BP Location: Right arm)  Pulse 69  Temp 95.9  F (35.5  C) (Oral)  Resp 15  Ht 1.651 m (5' 5\")  Wt 94.1 kg (207 lb 6.4 oz)  SpO2 92%  BMI 34.51 kg/m2  Body mass index is 34.51 kg/(m^2).  A 10-point review of systems was performed by Ubaldo Beckford MD and is negative, no new findings.      Psychiatric Examination     Appearance[TW1.1] Sitting in chair[TW1.3], dressed in gown. Appears stated age.   Attitude[TW1.1] C[TW1.3]ooperative   Orientation Oriented to person only   Eye Contact Poor   Speech Regular rate, rhythm, volume and tone   Language Normal   Psychomotor Behavior[TW1.1] Calmer[TW1.3]   Mood[TW1.1] Calmer[TW1.3]   Affect[TW1.1] Wider[TW1.3] range   Thought Process[TW1.1] A little more organized[TW1.3]   Associations Some loose associations   Thought Content Patient is currently negative for suicide ideation, negative for plan or intent, able to contract no self harm and identify barriers to suicide.[TW1.1] Negative[TW1.3] for obsessions, compulsions or psychosis.      Fund of Knowledge Limited   Insight Grossly impaired   Judgement Impaired   Attention Span & Concentration Impaired   Recent & Remote Memory Severely impaired   Gait NA   Muscle Tone Intact       Labs     Labs reviewed.  Recent Results (from the past 24 hour(s))   Glucose by meter    Collection Time: 10/16/17  1:38 PM   Result Value Ref Range    Glucose 129 (H) 70 - 99 mg/dL   Glucose by meter    Collection Time: 10/16/17  5:34 PM   Result Value Ref Range    Glucose 185 (H) 70 - 99 mg/dL   Glucose by meter    Collection Time: 10/16/17 10:36 PM   Result Value Ref Range    Glucose 193 (H) 70 - 99 mg/dL   Glucose by meter    " Collection Time: 10/17/17  2:24 AM   Result Value Ref Range    Glucose 129 (H) 70 - 99 mg/dL        Impression     The patient Amber Hull is a 72-year-old woman with a medical history significant for diabetes mellitus, paroxysmal atrial fibrillation, hypertension, and possible Alzheimer's who presented to the [facility] ED due to a scalp laceration. She has significant memory impairment. She scored poorly on the Mini Mental Status Exam, indicating significant memory impairment. According to EMS and a friend named Dayami with whom Pt lives, Pt's house is in disarray and there is feces everywhere. Her memory has been deteriorating over time according to Dayami; in November 2016 she presented to the ED after being found wandering around outside in the cold without shoes. Pt is[TW1.1] a little less[TW1.3] disorganized[TW1.1] today, she is calmer[TW1.3].[TW1.1] It turns out Pt has a healthcare POA in place named[TW1.3] Ludmila Borges[TW1.1] who can make decisions for Pt, so commitment is not necessary. She will be referred to a nursing home or TCU, which Pt is agreeable to. She does not appear to need geriatric psychiatry, however she does need a greater level of care than can be provided at home.[TW1.3]      Diagnoses     1. Neurocognitive deficit, consistent with Alzheimer's     Plan     1. Explained Side effects, benefits and complications of medications to the patient.   2. Medication changes: none  3. Discussed treatment plan with patient and team.[TW1.1]  4. Pt has a healthcare POA in place named[TW1.3] Ludmila Borges[TW1.1], making commitment unnecessary  5. Pt will be discharged to a nursing home or TCU[TW1.3]      TIME SPENT IN PSYCHIATRY CONSULT: 15 MINUTES.    Attestation:   Patient has been seen and evaluated by me, Ubaldo Beckford MD.    Patient ID:  Name: Amber Hull    MRN: [id number]  Admission: 10/12/2017    YOB: 1945[TW1.1]      Revision History        User Key Date/Time User  Provider Type Action    > TW1.3 10/17/2017  1:29 PM Ubaldo Beckford MD Physician Sign     TW1.2 10/17/2017 11:16 AM Ubaldo Beckford MD Physician      TW1.1 10/17/2017 11:11 AM Ubaldo Beckford MD Physician             Consults by Ubaldo Beckford MD at 10/17/2017 12:02 PM     Author:  Ubaldo Beckford MD Service:  Psychiatry Author Type:  Physician    Filed:  10/17/2017 12:02 PM Date of Service:  10/17/2017 12:02 PM Creation Time:  10/17/2017 12:02 PM    Status:  Signed :  Ubaldo Beckford MD (Physician)     Consult Orders:    1. Psychiatry IP Consult: disposition recommendation maddei [facility] worker coming today at 13:00; Consultant may enter orders: Yes; Patient to be seen: Routine; Call back #: [phone] [[id number]] ordered by Inessa Barton MD at 10/17/17 1026                Follow-Up Psychiatric Consult: Time Spent: 15 Minutes  Ubaldo Beckford MD.[TW1.1]       Revision History        User Key Date/Time User Provider Type Action    > TW1.1 10/17/2017 12:02 PM Ubaldo Beckford MD Physician Sign            Consults by Ubaldo Beckford MD at 10/15/2017 10:40 AM     Author:  Ubaldo Beckford MD Service:  Psychiatry Author Type:  Physician    Filed:  10/15/2017 10:40 AM Date of Service:  10/15/2017 10:40 AM Creation Time:  10/15/2017 10:40 AM    Status:  Signed :  Ubaldo Beckford MD (Physician)     Consult Orders:    1. Psychiatry IP Consult: delirium.; Consultant may enter orders: Yes; Patient to be seen: Routine; Call back #: [phone] [[id number]] ordered by Mariaelena Ordonez DO at 10/15/17 0835                Follow-Up Psychiatric Consult: Time Spent: 15 Minutes  Ubaldo Beckford MD.[TW1.1]       Revision History        User Key Date/Time User Provider Type Action    > TW1.1 10/15/2017 10:40 AM Ubaldo eBckford MD Physician Sign            Consults by Ubaldo Beckford MD at 10/15/2017 10:40 AM     Author:  Shakeel  Ubaldo MATTHEW MD Service:  Psychiatry Author Type:  Physician    Filed:  10/15/2017 10:40 AM Date of Service:  10/15/2017 10:40 AM Creation Time:  10/15/2017 10:38 AM    Status:  Signed :  Ubaldo Beckford MD (Physician)         [facility] Psychiatric Consult Follow-up Note      TIME SPENT IN PSYCHIATRY CONSULT: 15 MINUTES.     Interim History     The patient's care was discussed, patient seen and chart notes were reviewed. Pt examined on 88. Tolerating medications without side effects. Side effects, risks, and benefits of medications reviewed with patient. Patient is currently negative for suicide ideation, negative for plan or intent, able to contract no self harm and identify barriers to suicide.  Negative for obsessions, compulsions or psychosis.  Spoke to the  today. Determined plan to file for commitment, as Pt has no ability to care for herself and has advanced cognitive impairment. Completed commitment paperwork. She was placed on 72-hour hold.      Medications[TW1.1]     Current Facility-Administered Medications Ordered in Epic   Medication Dose Route Frequency Last Rate Last Dose     insulin glargine (LANTUS) injection 35 Units  35 Units Subcutaneous At Bedtime         insulin aspart (NovoLOG) inj (RAPID ACTING)  1-10 Units Subcutaneous TID AC   2 Units at 10/14/17 1232     insulin aspart (NovoLOG) inj (RAPID ACTING)  1-7 Units Subcutaneous At Bedtime   3 Units at 10/14/17 2202     lisinopril (PRINIVIL/ZESTRIL) tablet 20 mg  20 mg Oral BID   20 mg at 10/14/17 1952     QUEtiapine (SEROquel) tablet 50 mg  50 mg Oral Q2H PRN   50 mg at 10/14/17 1952     OLANZapine zydis (zyPREXA) ODT tab 10 mg  10 mg Oral Q4H PRN   10 mg at 10/13/17 1357     OLANZapine (zyPREXA) injection 10 mg  10 mg Intramuscular Q4H PRN         metoprolol (LOPRESSOR) tablet 75 mg  75 mg Oral BID   75 mg at 10/14/17 1952     naloxone (NARCAN) injection 0.1-0.4 mg  0.1-0.4 mg Intravenous Q2 Min PRN          "acetaminophen (TYLENOL) tablet 650 mg  650 mg Oral Q4H PRN         acetaminophen (TYLENOL) Suppository 650 mg  650 mg Rectal Q4H PRN         senna-docusate (SENOKOT-S;PERICOLACE) 8.6-50 MG per tablet 1-2 tablet  1-2 tablet Oral BID PRN         bisacodyl (DULCOLAX) Suppository 10 mg  10 mg Rectal Daily PRN         ondansetron (ZOFRAN-ODT) ODT tab 4 mg  4 mg Oral Q6H PRN        Or     ondansetron (ZOFRAN) injection 4 mg  4 mg Intravenous Q6H PRN         traMADol (ULTRAM) half-tab 25 mg  25 mg Oral Q6H PRN         glucose 40 % gel 15-30 g  15-30 g Oral Q15 Min PRN        Or     dextrose 50 % injection 25-50 mL  25-50 mL Intravenous Q15 Min PRN        Or     glucagon injection 1 mg  1 mg Subcutaneous Q15 Min PRN         labetalol (NORMODYNE/TRANDATE) injection 10 mg  10 mg Intravenous Q2H PRN   10 mg at 10/13/17 0208     hydrALAZINE (APRESOLINE) injection 5 mg  5 mg Intravenous Q2H PRN         No current Epic-ordered outpatient prescriptions on file.[TW1.2]         Allergies[TW1.1]      Allergies   Allergen Reactions     No Known Allergies[TW1.2]         Medical Review of Systems[TW1.1]     /67 (BP Location: Right arm)  Pulse 69  Temp 96.3  F (35.7  C) (Axillary)  Resp 16  Ht 1.651 m (5' 5\")  Wt 94.1 kg (207 lb 6.4 oz)  SpO2 96%  BMI 34.51 kg/m2  Body mass index is 34.51 kg/(m^2).[TW1.2]  A 10-point review of systems was performed by[TW1.1] Ubaldo Beckford MD[TW1.2] and is negative, no new findings.      Psychiatric Examination     Appearance Lying in bed, dressed in gown. Appears stated age.   Attitude Somewhat cooperative   Orientation Oriented to person only   Eye Contact Poor   Speech Regular rate, rhythm, volume and tone   Language Normal   Psychomotor Behavior Supine   Mood Irritable   Affect Limited range   Thought Process Disorganized   Associations Some loose associations   Thought Content Patient is currently negative for suicide ideation, negative for plan or intent, able to contract no " self harm and identify barriers to suicide. Positive for obsessions, compulsions or psychosis.      Fund of Knowledge Limited   Insight Grossly impaired   Judgement Impaired   Attention Span & Concentration Impaired   Recent & Remote Memory Severely impaired   Gait NA   Muscle Tone Intact       Labs     Labs reviewed.[TW1.1]  Recent Results (from the past 24 hour(s))   Glucose by meter    Collection Time: 10/14/17 12:23 PM   Result Value Ref Range    Glucose 167 (H) 70 - 99 mg/dL   Glucose by meter    Collection Time: 10/14/17  6:23 PM   Result Value Ref Range    Glucose 238 (H) 70 - 99 mg/dL   Glucose by meter    Collection Time: 10/14/17 10:04 PM   Result Value Ref Range    Glucose 254 (H) 70 - 99 mg/dL   Glucose by meter    Collection Time: 10/15/17  1:58 AM   Result Value Ref Range    Glucose 204 (H) 70 - 99 mg/dL[TW1.2]        Impression     The patient Amber Hull is a 72-year-old woman with a medical history significant for diabetes mellitus, paroxysmal atrial fibrillation, hypertension, and possible Alzheimer's who presented to the [facility] ED due to a scalp laceration. She has significant memory impairment. She scored poorly on the Mini Mental Status Exam, indicating significant memory impairment. According to EMS and a friend named Dayami with whom Pt lives, Pt's house is in disarray and there is feces everywhere. Her memory has been deteriorating over time according to Dayami; in November 2016 she presented to the ED after being found wandering around outside in the cold without shoes. Pt is more disorganized, incoherent, and disoriented. Guardianship is being pursued. Completed commitment paperwork today. Pt was placed on 72-hour hold.       Diagnoses     1. Neurocognitive deficit, consistent with Alzheimer's     Plan     1. Explained Side effects, benefits and complications of medications to the patient.   2. Medication changes: none  3. Discussed treatment plan with patient and team.  4. Pursue  "guardianship  5. Commitment paperwork completed      TIME SPENT IN PSYCHIATRY CONSULT: 15 MINUTES.    Attestation:   Patient has been seen and evaluated by me,[TW1.1] Ubaldo Beckford MD[TW1.2].    Patient ID:  Name: Amber Hull    MRN: [id number]  Admission: 10/12/2017    YOB: 1945[TW1.1]      Revision History        User Key Date/Time User Provider Type Action    > TW1.2 10/15/2017 10:40 AM Ubaldo Beckford MD Physician Sign     TW1.1 10/15/2017 10:37 AM Ubaldo Beckford MD Physician             Consults by Ubaldo Beckford MD at 10/14/2017  2:40 PM     Author:  Ubaldo Beckford MD Service:  Psychiatry Author Type:  Physician    Filed:  10/14/2017  8:21 PM Date of Service:  10/14/2017  2:40 PM Creation Time:  10/14/2017  2:39 PM    Status:  Signed :  Ubaldo Beckford MD (Physician)         [facility] Psychiatric Consult Follow-up Note      TIME SPENT IN PSYCHIATRY CONSULT:[TW1.1] 15[TW1.2] MINUTES.     Interim History     The patient's care was discussed, patient seen and chart notes were reviewed. Pt examined on 88. Tolerating medications without side effects. Side effects, risks, and benefits of medications reviewed with patient. Patient is currently negative for suicide ideation, negative for plan or intent, able to contract no self harm and identify barriers to suicide.  Negative for obsessions, compulsions or psychosis.[TW1.1]  Pt's condition appears to be declining. Yesterday we were able to have a conversation, and today she is somewhat incoherent and confabulated, and is more disorganized and disoriented.[TW1.3] Pt reports she became agitated yesterday because \"you [Dr. Beckford] told me I could leave, and then they[TW1.1] [nursing staff][TW1.3] told me that I wa[TW1.1]s[TW1.3] not allowed to leave.\"[TW1.1] Discussed that Dr. Beckford never said she could leave, and in fact asked her to go voluntarily to assisted living. Discussed a " facility another time and[TW1.3] Pt verbalized agreement to be referred to a facility.[TW1.1] Spoke with the  and discussed Pt's current situation. She has lived with her friend Dayami for 15 years, but Dayami will not allow Pt to return home because Pt has ruined portions of Dayami's home with incontinence, and Dayami and her  can no longer provide Pt with the level of care she needs. Pt does not currently have a home to discharge to, and she does not currently have medical decision making capacity, but she does not have a legal guardian or someone with POA. The  spoke to Dayami and Dayami agreed to consider becoming a guardian, however she is noncommittal. Discussed with  that Pt will need to be referred to some kind of facility, however it is unclear whether Pt has assets to support a stay at a facility. Psychiatry recommends that guardianship be pursued, thought it might take a long time to process.[TW1.3]      Medications     Current Facility-Administered Medications Ordered in Epic   Medication Dose Route Frequency Last Rate Last Dose     insulin aspart (NovoLOG) inj (RAPID ACTING)  1-10 Units Subcutaneous TID AC   2 Units at 10/14/17 1232     insulin aspart (NovoLOG) inj (RAPID ACTING)  1-7 Units Subcutaneous At Bedtime   4 Units at 10/13/17 2148     insulin glargine (LANTUS) injection 30 Units  30 Units Subcutaneous At Bedtime   30 Units at 10/13/17 2146     lisinopril (PRINIVIL/ZESTRIL) tablet 20 mg  20 mg Oral BID   20 mg at 10/14/17 0802     QUEtiapine (SEROquel) tablet 50 mg  50 mg Oral Q2H PRN   50 mg at 10/14/17 0130     OLANZapine zydis (zyPREXA) ODT tab 10 mg  10 mg Oral Q4H PRN   10 mg at 10/13/17 1357     OLANZapine (zyPREXA) injection 10 mg  10 mg Intramuscular Q4H PRN         metoprolol (LOPRESSOR) tablet 75 mg  75 mg Oral BID   75 mg at 10/14/17 1232     naloxone (NARCAN) injection 0.1-0.4 mg  0.1-0.4 mg Intravenous Q2 Min PRN         acetaminophen  "(TYLENOL) tablet 650 mg  650 mg Oral Q4H PRN         acetaminophen (TYLENOL) Suppository 650 mg  650 mg Rectal Q4H PRN         senna-docusate (SENOKOT-S;PERICOLACE) 8.6-50 MG per tablet 1-2 tablet  1-2 tablet Oral BID PRN         bisacodyl (DULCOLAX) Suppository 10 mg  10 mg Rectal Daily PRN         ondansetron (ZOFRAN-ODT) ODT tab 4 mg  4 mg Oral Q6H PRN        Or     ondansetron (ZOFRAN) injection 4 mg  4 mg Intravenous Q6H PRN         traMADol (ULTRAM) half-tab 25 mg  25 mg Oral Q6H PRN         glucose 40 % gel 15-30 g  15-30 g Oral Q15 Min PRN        Or     dextrose 50 % injection 25-50 mL  25-50 mL Intravenous Q15 Min PRN        Or     glucagon injection 1 mg  1 mg Subcutaneous Q15 Min PRN         labetalol (NORMODYNE/TRANDATE) injection 10 mg  10 mg Intravenous Q2H PRN   10 mg at 10/13/17 0208     hydrALAZINE (APRESOLINE) injection 5 mg  5 mg Intravenous Q2H PRN         No current Epic-ordered outpatient prescriptions on file.         Allergies      Allergies   Allergen Reactions     No Known Allergies         Medical Review of Systems     /79 (BP Location: Right arm)  Pulse 72  Temp 95.8  F (35.4  C) (Oral)  Resp 18  Ht 1.651 m (5' 5\")  Wt 94.1 kg (207 lb 6.4 oz)  SpO2 98%  BMI 34.51 kg/m2  Body mass index is 34.51 kg/(m^2).  A 10-point review of systems was performed by Ubaldo Beckford MD and is negative, no new findings.      Psychiatric Examination     Appearance Lying in bed, dressed in gown. Appears stated age.   Attitude[TW1.1] Somewhat c[TW1.3]ooperative   Orientation[TW1.1] Oriented to person only[TW1.3]   Eye Contact Poor   Speech Regular rate, rhythm, volume and tone   Language Normal   Psychomotor Behavior[TW1.1] Supine[TW1.3]   Mood[TW1.1] Irritable[TW1.3]   Affect[TW1.1] Limited range[TW1.3]   Thought Process[TW1.1] Disorganized[TW1.3]   Associations[TW1.1] Some loose associations[TW1.3]   Thought Content[TW1.1] Patient is currently negative for suicide ideation, negative for " plan or intent, able to contract no self harm and identify barriers to suicide.  Negative for obsessions, compulsions or psychosis.[TW1.3]      Fund of Knowledge[TW1.1] Limited[TW1.3]   Insight[TW1.1] Grossly impaired[TW1.3]   Judgement[TW1.1] Impaired[TW1.3]   Attention Span & Concentration[TW1.1] Impaired[TW1.3]   Recent & Remote Memory[TW1.1] Severely impaired[TW1.3]   Gait N[TW1.1]A[TW1.3]   Muscle Tone Intact       Labs     Labs reviewed.  Recent Results (from the past 24 hour(s))   Glucose by meter    Collection Time: 10/13/17  5:54 PM   Result Value Ref Range    Glucose 321 (H) 70 - 99 mg/dL   Glucose by meter    Collection Time: 10/13/17  9:41 PM   Result Value Ref Range    Glucose 278 (H) 70 - 99 mg/dL   Glucose by meter    Collection Time: 10/14/17  1:38 AM   Result Value Ref Range    Glucose 174 (H) 70 - 99 mg/dL   Basic metabolic panel    Collection Time: 10/14/17  7:00 AM   Result Value Ref Range    Sodium 145 (H) 133 - 144 mmol/L    Potassium 3.6 3.4 - 5.3 mmol/L    Chloride 111 (H) 94 - 109 mmol/L    Carbon Dioxide 25 20 - 32 mmol/L    Anion Gap 9 3 - 14 mmol/L    Glucose 191 (H) 70 - 99 mg/dL    Urea Nitrogen 23 7 - 30 mg/dL    Creatinine 1.38 (H) 0.52 - 1.04 mg/dL    GFR Estimate 38 (L) >60 mL/min/1.7m2    GFR Estimate If Black 46 (L) >60 mL/min/1.7m2    Calcium 8.4 (L) 8.5 - 10.1 mg/dL   Glucose by meter    Collection Time: 10/14/17 12:23 PM   Result Value Ref Range    Glucose 167 (H) 70 - 99 mg/dL        Impression[TW1.1]     The patient Amber Hull is a 72-year-old woman with a medical history significant for diabetes mellitus, paroxysmal atrial fibrillation, hypertension, and possible Alzheimer's who presented to the [facility] ED due to a scalp laceration. She has significant memory impairment. She scored poorly on the Mini Mental Status Exam, indicating significant memory impairment. According to EMS and a friend named Dayami with whom Pt lives, Pt's house is in disarray and there is feces  everywhere. Her memory has been deteriorating over time according to Dayami; in November 2016 she presented to the ED after being found wandering around outside in the cold without shoes. Pt is worse than she was yesterday, she is more disorganized, incoherent, and disoriented. Pt will need a legal guardian and referral to a long term care facility eventually.[TW1.3]      Diagnoses[TW1.1]     1. Neurocognitive deficit, consistent with Alzheimer's[TW1.3]     Plan     1. Explained Side effects, benefits and complications of medications to the patient.   2. Medication changes:[TW1.1] none[TW1.3]  3. Discussed treatment plan with patient and team.[TW1.1]  4. Pursue guardianship[TW1.3]      TIME SPENT IN PSYCHIATRY CONSULT:[TW1.1] 15[TW1.2] MINUTES.    Attestation:   Patient has been seen and evaluated by meUbaldo MD.    Patient ID:  Name: Amber Hull    MRN: [id number]  Admission: 10/12/2017    YOB: 1945[TW1.1]      Revision History        User Key Date/Time User Provider Type Action    > TW1.3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 bradycardia.[SI1.2]    - Continue Tenex t2 mg daily, HCTZ 12.5mg daily, and Norvasc 10 mg p.o. Daily[SI1.1]  - continue hydralazine 10 mg po TID , if needed that can be increased in future for better blood pressure control.[SI1.2]  - Full-dose[SI1.1] A[SI1.2]spirin only given fall risk  - prn IV[SI1.1] H[SI1.2]ydralazine and[SI1.1] L[SI1.2]abetalol  - Depending upon her disposition plan, if patient is more in safe environment, may consider starting her back on Eliquis although she remains at high risk for fall       DM II, Uncontrolled: A1C 12.8 this stay. No known/reported complications from diabetes.Patient was supposed ot have been taking Lantus 30U daily but hasn't been taking it for some time now.  on presentation. Now much better on the current regimen.Expecting HgA1c to improve in the next 3 months     - Mod carb diet  - Lantus 37U HS   - Premeal Novolog to 9 units TID  - Medium SSI.      Suspected stage III CKD: Cr 1.42 on admission. Her baseline creatinine is around 1.4-1.6. Her creatinine went up when she was started back on her ACE inhibitor now she is off the lisinopril and received some fluids.  Her creatinine is back in normal range. Likely patient is unable to tolerate Ace inhibitors and the presence of her chronic kidney disease. Creatinine is in normal range       Fall and scalp laceration: This was likely a mechanical fall. She sustained a scalp laceration that was sutured in the ED. CT scan of her head was negative.  Received Tdap vaccine in the ED. Staples and suture removed by RN 10/23[SI1.1], healed[SI1.2]   - continue local care      DVT Prophylaxis: Pneumatic Compression Devices    Code Status: Full Code    Disposition: patient is medically stable to be discharge ,[SI1.1] tentative discharge tomorrow to [facility].[SI1.2]    Inessa Barton MD, [facility] medicine     Interval History   Patient[SI1.1] was seen and examined, doing well, very excited about the possibility of discharge tomorrow, she knows a friend in [facility] and hoping to spend time with her  , she would like to meet  , she is denying any complaints this morning .[SI1.2]    -Data reviewed today: I reviewed all new labs and imaging results over the last 24 hours. I personally reviewed no images or EKG's today.    Physical Exam   Temp: 97  F (36.1  C) Temp src: Oral BP: 135/71   Heart Rate: 50 Resp: 16 SpO2: 98 % O2 Device: None (Room air)    Vitals:    10/12/17 1835 10/12/17 2333   Weight: 90.7 kg (200 lb) 94.1 kg (207 lb 6.4 oz)     Vital Signs with Ranges  Temp:  [96.8  F (36  C)-97.1  F (36.2  C)] 97  F (36.1  C)  Heart Rate:  [50-66] 50  Resp:  [16] 16  BP: (135-173)/(58-82) 135/71  SpO2:  [96 %-98 %] 98 %  I/O last 3 completed shifts:  In: 600 [P.O.:600]  Out: -     Constitutional: Awake, alert, cooperative, no apparent distress, sitting in chair.  Respiratory: Clear to auscultation bilaterally, no crackles or wheezing  Cardiovascular: Regular rate and rhythm, normal S1 and S2, and no murmur noted  GI: Normal bowel sounds, soft, non-distended, non-tender  Skin/Integumen: No rashes, no cyanosis, trace pretibial edema B/L    Medications        hydrALAZINE  10 mg Oral TID     insulin aspart  9 Units Subcutaneous TID w/meals     hydrochlorothiazide  12.5 mg Oral Daily     guanFACINE  2 mg Oral Daily     insulin glargine  37 Units Subcutaneous At Bedtime     amLODIPine  10 mg Oral Daily     aspirin EC  325 mg Oral Daily     insulin aspart  1-7 Units Subcutaneous TID AC     insulin aspart  1-5 Units Subcutaneous At Bedtime       Data     Recent  Labs  Lab 10/30/17  0653 10/26/17  0650 10/25/17  0650    143 140   POTASSIUM 4.9 5.3 5.1   CHLORIDE 112* 113* 110*   CO2 24 22 23   BUN 42* 59* 63*   CR 1.35* 1.56* 1.79*   ANIONGAP 7 8 7   IDANIA 8.9 8.8 8.6   * 198* 148*       No results found for this or any previous visit (from the past 24 hour(s)).[SI1.1]      Revision History        User Key Date/Time User Provider Type Action    > SI1.2 10/31/2017  1:48 PM Inessa Barton MD Physician Sign     SI1.1 10/31/2017 12:24 PM Inessa Barton MD Physician             Progress Notes by Gabrielle Sesay LSW at 10/30/2017  3:08 PM     Author:  Gabrielle Sesay LSW Service:  (none) Author Type:      Filed:  10/30/2017  3:22 PM Date of Service:  10/30/2017  3:08 PM Creation Time:  10/30/2017  3:08 PM    Status:  Addendum :  Gabrielle Sesay LSW ()         DAMIAN  I: DAMIAN spoke with Marv from [facility] who stated that Manan Benson is completing MA valentín. DAMIAN called Manan Benson ([phone])DAMIAN left message for Manan and awaits a call back. DAMIAN spoke with Dayami who stated she was working with [employer]unn (-P: [phone] F:[phone]) and that he had requested paperwork for patient. DAMIAN called Ludmila to update her on info and had to leave a voicemail. DAMIAN requested c/b. DAMIAN called [employer] Bunn and provided information that was requested. [employer] will fax form that MD needs to complete and DAMIAN will fax back. Damian received message from Manan Benson stating that he completed MA application the best he could and was in the process of getting bank statements. DAMIAN called and left message with Manan and requested a c/b. Damina called Jim shin to update them. DAMIAN left message requesting a c/b.    P: SW will continue to follow and assist as needed.[SM1.1]    ADDENDUM  I: DAMIAN spoke with Aarti Butler from [facility] and updated on status of patient's guardianship/MA Valentín. Aarti Butler was going to speak her director  and update SW in AM as to whether they can accept patient with things moving forward. SW awaits an update in AM.[SM1.2]    MAU Banegas   *[id number][SM1.1]       Revision History        User Key Date/Time User Provider Type Action    > SM1.2 10/30/2017  3:22 PM Gabrielle Sesay LSW  Addend     SM1.1 10/30/2017  3:15 PM Gabrielle Sesay LSW  Sign            Progress Notes by Inessa Barton MD at 10/30/2017  8:57 AM     Author:  Inessa Barton MD Service:  Hospitalist Author Type:  Physician    Filed:  10/30/2017  9:08 AM Date of Service:  10/30/2017  8:57 AM Creation Time:  10/30/2017  8:57 AM    Status:  Signed :  Inessa Barton MD (Physician)         [facility]    Hospitalist Progress Note    Date of Service (when I saw the patient): 10/30/2017    Assessment & Plan   [facility] Cumulative Summary: Amber Hull is a 72 year old female who was admitted on 10/12/2017 her PMH is significant for hypertension, paroxysmal afib and DM with suspected cognitive decline and inability to care for herself who was admitted under observation on 10/12/2017 for evaluation after a fall which resulted in a scalp laceration. Patient was admitted for further evaluation and management.she got sutures for the scalp laceration. She was living the friend Dayami but due to overall decline in patient status , she does not think that she can continue to help patient care.she was evaluated by psych and was found to have significant memory impairment and did very poor on her MMS, her neurocognitive deficits were consistent with Alzheimer's dementia ,  and  are following closely for disposition.  During her stay she was started back on her insulin and insulin is adjusted accordingly she is also started back on her antihypertensive medications she seemed to have elevated creatinine and her Ace inhibitors are stopped. currently she is doing well on the current  regimen for HTN and diabetes and her creatinine is back to her baseline.she is also doing very well on the current psych med's and has not required any PRN medications for agitation since Oct 19 th.      Worsening cognitive function and inability to care for self, suspect Alzheimer's dementia :  Patient had been living with Dayami, a family friend. The patient does not have any relations. She has had gradual decline in her cognitive function suggestive of worsening dementia. Dayami thinks that this is the best she has been and says she is worse at times in terms of her memory and cognitive issues.  Despite this the patient continues to drive and is not willing to surrender her driving license and stop driving. Dayami does not think she will be able to continue to help care for Ms. Hull. Noted to have scored poorly on the MMSE. Patient was evaluated by psychiatry as per their recommendation patient does not need commitment nor geriatric psych at this point. Per psych, does not need to pursue commitment as she does have POA for health care [facility]. She required Olanzapine on October 19.    - SW following, now applying for medicaid via state assistance   - prn Seroquel and prn Zyprexa for agitation ( not requiring as she is very stable on the current psych meds)  - ambulate QID with assist     Hypertension  Paroxysmal atrial fibrillation :  Was supposed to have been taking lisinopril 20mg BID and metoprolol 75mg BID but had not been taking either of these, now bp is improved on current medications. Was prescribed Apixaban 5mg BID which she has not been taking. Note her CHADs2 VASc score is 4, indicating a 4% yearly stroke risk. Discontinued metoprolol 10/29.    - Continue Tenex to 2 mg daily, HCTZ 12.5mg daily, and Norvasc 10 mg p.o. Daily  - will monitor for tachycardia as BB was stopped on 10/29  - Full-dose aspirin only given fall risk  - prn IV hydralazine and labetalol  - Depending upon her disposition  plan, if patient is more in safe environment, may consider starting her back on Eliquis although she remains at high risk for fall       DM II, Uncontrolled: A1C 12.8 this stay. No known/reported complications from diabetes.Patient was supposed ot have been taking Lantus 30U daily but hasn't been taking it for some time now.  on presentation. Now much better on the current regimen.Expecting HgA1c to improve in the next 3 months     - Mod carb diet  - Lantus 37U HS   - Premeal Novolog to 9 units TID  - Medium SSI.      Suspected stage III CKD: Cr 1.42 on admission. Her baseline creatinine is around 1.4-1.6. Her creatinine went up when she was started back on her ACE inhibitor now she is off the lisinopril and received some fluids.  Her creatinine is back in normal range. Likely patient is unable to tolerate Ace inhibitors and the presence of her chronic kidney disease. Creatinine is in normal range   - will monitor BMP from time to time.      Fall and scalp laceration: This was likely a mechanical fall. She sustained a scalp laceration that was sutured in the ED. CT scan of her head was negative.  Received Tdap vaccine in the ED. Staples and suture removed by RN 10/23  - continue local care     DVT Prophylaxis: Pneumatic Compression Devices    Code Status: Full Code    Disposition: patient is medically stable to be discharge , plan to discharge when safe disposition is available   Appreciate  and  help helping with the discharge planning.    Inessa Barton MD, [facility] medicine     Interval History   Patient care was assumed this morning , known to me from a week ago , she told me that she is doing better and is hoping that she can get the bed this week and is able to leave hospital, she is hoping that her BP and diabetes would be better controlled with the additional help after the discharge .    -Data reviewed today: I reviewed all new labs and imaging results over the last 24  hours. I personally reviewed no images or EKG's today.    Physical Exam   Temp: 96.8  F (36  C) Temp src: Oral BP: 150/71 Pulse: 60 Heart Rate: 53 Resp: 16 SpO2: 98 % O2 Device: None (Room air)    Vitals:    10/12/17 1835 10/12/17 2333   Weight: 90.7 kg (200 lb) 94.1 kg (207 lb 6.4 oz)     Vital Signs with Ranges  Temp:  [96.4  F (35.8  C)-97.8  F (36.6  C)] 96.8  F (36  C)  Pulse:  [60] 60  Heart Rate:  [52-58] 53  Resp:  [16-18] 16  BP: (149-160)/(66-74) 150/71  SpO2:  [94 %-98 %] 98 %  I/O last 3 completed shifts:  In: 1350 [P.O.:1350]  Out: -     Constitutional: Awake, alert, cooperative, no apparent distress, sitting in chair.  Respiratory: Clear to auscultation bilaterally, no crackles or wheezing  Cardiovascular: Regular rate and rhythm, normal S1 and S2, and no murmur noted  GI: Normal bowel sounds, soft, non-distended, non-tender  Skin/Integumen: No rashes, no cyanosis, trace pretibial edema B/L    Medications        insulin aspart  9 Units Subcutaneous TID w/meals     hydrochlorothiazide  12.5 mg Oral Daily     guanFACINE  2 mg Oral Daily     insulin glargine  37 Units Subcutaneous At Bedtime     amLODIPine  10 mg Oral Daily     aspirin EC  325 mg Oral Daily     insulin aspart  1-7 Units Subcutaneous TID AC     insulin aspart  1-5 Units Subcutaneous At Bedtime       Data     Recent Labs  Lab 10/30/17  0653 10/26/17  0650 10/25/17  0650    143 140   POTASSIUM 4.9 5.3 5.1   CHLORIDE 112* 113* 110*   CO2 24 22 23   BUN 42* 59* 63*   CR 1.35* 1.56* 1.79*   ANIONGAP 7 8 7   IDANIA 8.9 8.8 8.6   * 198* 148*       No results found for this or any previous visit (from the past 24 hour(s)).[SI1.1]      Revision History        User Key Date/Time User Provider Type Action    > SI1.1 10/30/2017  9:08 AM Inessa Barton MD Physician Sign            Progress Notes by Reyna Cordero MD at 10/29/2017 10:28 AM     Author:  Reyna Cordero MD Service:  Hospitalist Author Type:  Physician    Filed:   10/29/2017 10:34 AM Date of Service:  10/29/2017 10:28 AM Creation Time:  10/29/2017 10:28 AM    Status:  Addendum :  Reyna Cordero MD (Physician)         [facility]    Hospitalist Progress Note    Date of Service (when I saw the patient): 10/29/2017    Assessment & Plan   Amber Hull is a 72 year old female who was admitted on 10/12/2017.      Worsening cognitive function and inability to care for self, suspect Alzheimer's dementia   Patient had been living with Dayami, a family friend. The patient does not have any relations. She has had gradual decline in her cognitive function suggestive of worsening dementia. Dayami thinks that this is the best she has been and says she is worse at times in terms of her memory and cognitive issues.  Despite this the patient continues to drive and is not willing to surrender her driving license and stop driving. Dayami does not think she will be able to continue to help care for Ms. Hull. Noted to have scored poorly on the MMSE. Patient was evaluated by psychiatry as per their recommendation patient does not need commitment nor geriatric psych at this point. Per psych, does not need to pursue commitment as she does have POA for [facility]. She required Olanzapine on October 19.  - SW following, now applying for medicaid via state assistance   - prn Seroquel and prn Zyprexa for agitation  - ambulate QID with assist     Hypertension  Paroxysmal atrial fibrillation  Was supposed to have been taking lisinopril 20mg BID and metoprolol 75mg BID but had not been taking either of these, now bp is improved on current medications. Was prescribed Apixaban 5mg BID which she has not been taking. Note her CHADs2 VASc score is 4, indicating a 4% yearly stroke risk. Discontinued metoprolol 10/29.  - c/w Tenex to 2 mg daily, HCTZ 12.5mg daily, and Norvasc 10 mg p.o. Daily  - c/w full-dose aspirin only given fall risk  - prn IV hydralazine and  labetalol  - Depending upon her disposition plan, if patient is more in safe environment, may consider starting her back on Eliquis although she remains at high risk for fall       DM II, Uncontrolled: A1C 12.8 this stay. No known/reported complications from diabetes.Patient was supposed ot have been taking Lantus 30U daily but hasn't been taking it for some time now.  on presentation.    - Mod carb diet  - c/w Lantus 37U HS   - 10/29: increased premeal Novolog to 9 units TID  - Medium SSI.      Suspected stage III CKD:   Cr 1.42 on admission. Her baseline creatinine is around 1.4-1.6. Her creatinine went up when she was started back on her ACE inhibitor now she is off the lisinopril and received some fluids.  Her creatinine is back in normal range. Likely patient is unable to tolerate Ace inhibitors and the presence of her chronic kidney disease. Per staff pt eating well but not hydrating; given elevated BUN to Cr ratio 10/25 encouraged free water oral intake. Pt states drinking more water, voiding adequately per staff, recheck renal function improved 10/26 with Cr 1.56.  - monitor, BMP in AM      Fall and scalp laceration  This was likely a mechanical fall. She sustained a scalp laceration that was sutured in the ED. CT scan of her head was negative.  Received Tdap vaccine in the ED. Staples and suture removed by RN 10/23  - continue local care     DVT Prophylaxis: Pneumatic Compression Devices  Code Status: Full Code    Disposition: Expected discharge day to day, see  notes.    Reyna Cordero MD    Interval History   No new complaints. Ambulating with staff without dizziness. Asks when she will be discharging.[SN1.1] Per staff, pt lost IV access. BP's stable. OK to leave out.[SN1.2]    -Data reviewed today: I reviewed all new labs and imaging results over the last 24 hours. I personally reviewed no images or EKG's today.    Physical Exam   Temp: 96.9  F (36.1  C) Temp src: Oral BP: 113/59  Heart Rate:  52 Resp: 16 SpO2: 98 % O2 Device: None (Room air)    Vitals:    10/12/17 1835 10/12/17 2333   Weight: 90.7 kg (200 lb) 94.1 kg (207 lb 6.4 oz)     Vital Signs with Ranges  Temp:  [96  F (35.6  C)-97.5  F (36.4  C)] 96.9  F (36.1  C)  Heart Rate:  [47-53] 52  Resp:  [14-16] 16  BP: (110-215)/() 113/59  SpO2:  [94 %-98 %] 98 %  I/O last 3 completed shifts:  In: 940 [P.O.:940]  Out: -     Constitutional: Awake, alert, cooperative, no apparent distress  Respiratory: Clear to auscultation bilaterally, no crackles or wheezing  Cardiovascular: Regular rate and rhythm, normal S1 and S2, and no murmur noted  GI: Normal bowel sounds, soft, non-distended, non-tender  Skin/Integumen: No rashes, no cyanosis, trace pretibial edema b/l    Medications[SN1.1]        insulin aspart  9 Units Subcutaneous TID w/meals     hydrochlorothiazide  12.5 mg Oral Daily     guanFACINE  2 mg Oral Daily     insulin glargine  37 Units Subcutaneous At Bedtime     sodium chloride (PF)  3 mL Intracatheter Q8H     amLODIPine  10 mg Oral Daily     aspirin EC  325 mg Oral Daily     insulin aspart  1-7 Units Subcutaneous TID AC     insulin aspart  1-5 Units Subcutaneous At Bedtime[SN1.3]       Data[SN1.1]     Recent Labs  Lab 10/26/17  0650 10/25/17  0650    140   POTASSIUM 5.3 5.1   CHLORIDE 113* 110*   CO2 22 23   BUN 59* 63*   CR 1.56* 1.79*   ANIONGAP 8 7   IDANIA 8.8 8.6   * 148*[SN1.3]       No results found for this or any previous visit (from the past 24 hour(s)).[SN1.1]      Revision History        User Key Date/Time User Provider Type Action    > [N/A] 10/29/2017 10:34 AM Reyna Cordero MD Physician Addend     SN1.2 10/29/2017 10:33 AM Reyna Cordero MD Physician Sign     SN1.3 10/29/2017 10:32 AM Reyna Cordero MD Physician      SN1.1 10/29/2017 10:28 AM Reyna Cordero MD Physician                   Procedure Notes     No notes of this type exist for this encounter.      Progress Notes - Therapies (Notes from  10/29/17 through 11/01/17)     No notes of this type exist for this encounter.

## 2017-10-12 NOTE — IP AVS SNAPSHOT
"[place] [address] ONCOLOGY: [phone]                                              INTERAGENCY TRANSFER FORM - LAB / IMAGING / EKG / EMG RESULTS   10/12/2017                    Hospital Admission Date: 10/12/2017  ELSA BISHOP   : 1945  Sex: Female        Attending Provider: Inessa Barton MD     Allergies:  No Known Allergies    Infection:  None   Service:  HOSPITALIST    Ht:  1.651 m (5' 5\")   Wt:  94.1 kg (207 lb 6.4 oz)   Admission Wt:  90.7 kg (200 lb)    BMI:  34.51 kg/m 2   BSA:  2.08 m 2            Patient PCP Information     Provider PCP Type    [facility]    Tirso Randle, OD, OD Ophthalmology         Lab Results - 3 Days      Glucose by meter [[id number]] (Abnormal)  Resulted: 17, Result status: Final result    Ordering provider: Rj Peres MD  17 Resulting lab: POINT OF CARE TEST, GLUCOSE    Specimen Information    Type Source Collected On     17          Components       Value Reference Range Flag Lab   Glucose 164 70 - 99 mg/dL H 170            Glucose by meter [[id number]] (Abnormal)  Resulted: 17, Result status: Final result    Ordering provider: Rj Peres MD  17 Resulting lab: POINT OF CARE TEST, GLUCOSE    Specimen Information    Type Source Collected On     17          Components       Value Reference Range Flag Lab   Glucose 167 70 - 99 mg/dL H 170   Comment:  /RN Notified            Glucose by meter [[id number]] (Abnormal)  Resulted: 10/31/17 2201, Result status: Final result    Ordering provider: Rj Peres MD  10/31/17 2151 Resulting lab: POINT OF CARE TEST, GLUCOSE    Specimen Information    Type Source Collected On     10/31/17 2151          Components       Value Reference Range Flag Lab   Glucose 191 70 - 99 mg/dL H 170            Glucose by meter [[id number]] (Abnormal)  Resulted: 10/31/17 172, Result status: Final result    Ordering provider: Rj Peres" MD  10/31/17 1706 Resulting lab: POINT OF CARE TEST, GLUCOSE    Specimen Information    Type Source Collected On     10/31/17 1706          Components       Value Reference Range Flag Lab   Glucose 154 70 - 99 mg/dL H 170            Glucose by meter [[id number]] (Abnormal)  Resulted: 10/31/17 1236, Result status: Final result    Ordering provider: Rj Peres MD  10/31/17 1224 Resulting lab: POINT OF CARE TEST, GLUCOSE    Specimen Information    Type Source Collected On     10/31/17 1224          Components       Value Reference Range Flag Lab   Glucose 202 70 - 99 mg/dL H 170   Comment:  /RN Notified            Glucose by meter [[id number]] (Abnormal)  Resulted: 10/31/17 0801, Result status: Final result    Ordering provider: Rj Peres MD  10/31/17 0758 Resulting lab: POINT OF CARE TEST, GLUCOSE    Specimen Information    Type Source Collected On     10/31/17 0758          Components       Value Reference Range Flag Lab   Glucose 134 70 - 99 mg/dL H 170            Glucose by meter [[id number]] (Abnormal)  Resulted: 10/31/17 0306, Result status: Final result    Ordering provider: Rj Peres MD  10/31/17 0257 Resulting lab: POINT OF CARE TEST, GLUCOSE    Specimen Information    Type Source Collected On     10/31/17 0257          Components       Value Reference Range Flag Lab   Glucose 154 70 - 99 mg/dL H 170   Comment:  /RN Notified            Glucose by meter [[id number]] (Abnormal)  Resulted: 10/30/17 2036, Result status: Final result    Ordering provider: Rj Peres MD  10/30/17 2029 Resulting lab: POINT OF CARE TEST, GLUCOSE    Specimen Information    Type Source Collected On     10/30/17 2029          Components       Value Reference Range Flag Lab   Glucose 234 70 - 99 mg/dL H 170            Glucose by meter [[id number]] (Abnormal)  Resulted: 10/30/17 1727, Result status: Final result    Ordering provider: Rj Peres MD  10/30/17 1714 Resulting lab: POINT OF CARE TEST, GLUCOSE     Specimen Information    Type Source Collected On     10/30/17 1714          Components       Value Reference Range Flag Lab   Glucose 159 70 - 99 mg/dL H 170            Glucose by meter [[id number]] (Abnormal)  Resulted: 10/30/17 1152, Result status: Final result    Ordering provider: Rj Peres MD  10/30/17 1141 Resulting lab: POINT OF CARE TEST, GLUCOSE    Specimen Information    Type Source Collected On     10/30/17 1141          Components       Value Reference Range Flag Lab   Glucose 163 70 - 99 mg/dL H 170            Glucose by meter [[id number]] (Abnormal)  Resulted: 10/30/17 0811, Result status: Final result    Ordering provider: Rj Peres MD  10/30/17 0741 Resulting lab: POINT OF CARE TEST, GLUCOSE    Specimen Information    Type Source Collected On     10/30/17 0741          Components       Value Reference Range Flag Lab   Glucose 126 70 - 99 mg/dL H 170            Glucose by meter [[id number]] (Abnormal)  Resulted: 10/30/17 0751, Result status: Final result    Ordering provider: Rj Peres MD  10/30/17 0137 Resulting lab: POINT OF CARE TEST, GLUCOSE    Specimen Information    Type Source Collected On     10/30/17 0137          Components       Value Reference Range Flag Lab   Glucose 198 70 - 99 mg/dL H 170            Glucose by meter [[id number]] (Abnormal)  Resulted: 10/30/17 0751, Result status: Final result    Ordering provider: Rj Peres MD  10/29/17 2113 Resulting lab: POINT OF CARE TEST, GLUCOSE    Specimen Information    Type Source Collected On     10/29/17 2113          Components       Value Reference Range Flag Lab   Glucose 223 70 - 99 mg/dL H 170            Glucose by meter [[id number]] (Abnormal)  Resulted: 10/30/17 0751, Result status: Final result    Ordering provider: Rj Peres MD  10/29/17 1125 Resulting lab: POINT OF CARE TEST, GLUCOSE    Specimen Information    Type Source Collected On     10/29/17 1125          Components       Value Reference  Range Flag Lab   Glucose 173 70 - 99 mg/dL H 170            Glucose by meter [[id number]] (Abnormal)  Resulted: 10/30/17 0751, Result status: Final result    Ordering provider: Rj Peres MD  10/29/17 0745 Resulting lab: POINT OF CARE TEST, GLUCOSE    Specimen Information    Type Source Collected On     10/29/17 0745          Components       Value Reference Range Flag Lab   Glucose 116 70 - 99 mg/dL H 170            Basic metabolic panel [[id number]] (Abnormal)  Resulted: 10/30/17 0738, Result status: Final result    Ordering provider: Reyna Cordero MD  10/30/17 0000 Resulting lab: [facility]    Specimen Information    Type Source Collected On   Blood  10/30/17 0653          Components       Value Reference Range Flag Lab   Sodium 143 133 - 144 mmol/L  FrStHsLb   Potassium 4.9 3.4 - 5.3 mmol/L  FrStHsLb   Chloride 112 94 - 109 mmol/L H FrStHsLb   Carbon Dioxide 24 20 - 32 mmol/L  FrStHsLb   Anion Gap 7 3 - 14 mmol/L  FrStHsLb   Glucose 123 70 - 99 mg/dL H FrStHsLb   Urea Nitrogen 42 7 - 30 mg/dL H FrStHsLb   Creatinine 1.35 0.52 - 1.04 mg/dL H FrStHsLb   GFR Estimate 39 >60 mL/min/1.7m2 L FrStHsLb   Comment:  Non  GFR Calc   GFR Estimate If Black 47 >60 mL/min/1.7m2 L FrStHsLb   Comment:  African American GFR Calc   Calcium 8.9 8.5 - 10.1 mg/dL  FrStHsLb            Glucose by meter [[id number]] (Abnormal)  Resulted: 10/29/17 0221, Result status: Final result    Ordering provider: Rj Peres MD  10/29/17 0207 Resulting lab: POINT OF CARE TEST, GLUCOSE    Specimen Information    Type Source Collected On     10/29/17 0207          Components       Value Reference Range Flag Lab   Glucose 132 70 - 99 mg/dL H 170            Glucose by meter [[id number]] (Abnormal)  Resulted: 10/28/17 2216, Result status: Final result    Ordering provider: Rj Peres MD  10/28/17 2209 Resulting lab: POINT OF CARE TEST, GLUCOSE    Specimen Information    Type Source Collected On      10/28/17 2209          Components       Value Reference Range Flag Lab   Glucose 205 70 - 99 mg/dL H 170            Testing Performed By     Lab - Abbreviation Name Director Address Valid Date Range    14 - FrStHsLb [facility] Unknown [address]  [place] [address] 05/08/15 1057 - Present    170 - Unknown POINT OF CARE TEST, GLUCOSE Unknown Unknown 10/31/11 1114 - Present            Unresulted Labs     None      Encounter-Level Documents:     There are no encounter-level documents.      Order-Level Documents:     There are no order-level documents.
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
## 2017-10-13 LAB
GLUCOSE BLDC GLUCOMTR-MCNC: 243 MG/DL (ref 70–99)
GLUCOSE BLDC GLUCOMTR-MCNC: 276 MG/DL (ref 70–99)
GLUCOSE BLDC GLUCOMTR-MCNC: 278 MG/DL (ref 70–99)
GLUCOSE BLDC GLUCOMTR-MCNC: 291 MG/DL (ref 70–99)
GLUCOSE BLDC GLUCOMTR-MCNC: 319 MG/DL (ref 70–99)
GLUCOSE BLDC GLUCOMTR-MCNC: 321 MG/DL (ref 70–99)
HBA1C MFR BLD: 12.8 % (ref 4.3–6)
HCT VFR BLD AUTO: 39.2 % (ref 35–47)
HGB BLD-MCNC: 13.4 G/DL (ref 11.7–15.7)
INR PPP: 1.04 (ref 0.86–1.14)

## 2017-10-13 PROCEDURE — 25000131 ZZH RX MED GY IP 250 OP 636 PS 637: Performed by: INTERNAL MEDICINE

## 2017-10-13 PROCEDURE — 83036 HEMOGLOBIN GLYCOSYLATED A1C: CPT | Performed by: HOSPITALIST

## 2017-10-13 PROCEDURE — A9270 NON-COVERED ITEM OR SERVICE: HCPCS | Mod: GY | Performed by: HOSPITALIST

## 2017-10-13 PROCEDURE — 25000132 ZZH RX MED GY IP 250 OP 250 PS 637: Mod: GY | Performed by: HOSPITALIST

## 2017-10-13 PROCEDURE — A9270 NON-COVERED ITEM OR SERVICE: HCPCS | Mod: GY | Performed by: PSYCHIATRY & NEUROLOGY

## 2017-10-13 PROCEDURE — 25000131 ZZH RX MED GY IP 250 OP 636 PS 637: Performed by: HOSPITALIST

## 2017-10-13 PROCEDURE — 36415 COLL VENOUS BLD VENIPUNCTURE: CPT | Performed by: HOSPITALIST

## 2017-10-13 PROCEDURE — 00000146 ZZHCL STATISTIC GLUCOSE BY METER IP

## 2017-10-13 PROCEDURE — 85014 HEMATOCRIT: CPT | Performed by: HOSPITALIST

## 2017-10-13 PROCEDURE — 99222 1ST HOSP IP/OBS MODERATE 55: CPT | Performed by: PSYCHIATRY & NEUROLOGY

## 2017-10-13 PROCEDURE — 85610 PROTHROMBIN TIME: CPT | Performed by: HOSPITALIST

## 2017-10-13 PROCEDURE — 25000132 ZZH RX MED GY IP 250 OP 250 PS 637: Mod: GY | Performed by: PSYCHIATRY & NEUROLOGY

## 2017-10-13 PROCEDURE — 99225 ZZC SUBSEQUENT OBSERVATION CARE,LEVEL II: CPT | Performed by: INTERNAL MEDICINE

## 2017-10-13 PROCEDURE — 96372 THER/PROPH/DIAG INJ SC/IM: CPT

## 2017-10-13 PROCEDURE — 25000128 H RX IP 250 OP 636: Performed by: HOSPITALIST

## 2017-10-13 PROCEDURE — A9270 NON-COVERED ITEM OR SERVICE: HCPCS | Mod: GY | Performed by: INTERNAL MEDICINE

## 2017-10-13 PROCEDURE — G0378 HOSPITAL OBSERVATION PER HR: HCPCS

## 2017-10-13 PROCEDURE — 85018 HEMOGLOBIN: CPT | Performed by: HOSPITALIST

## 2017-10-13 PROCEDURE — 96374 THER/PROPH/DIAG INJ IV PUSH: CPT

## 2017-10-13 PROCEDURE — 25000132 ZZH RX MED GY IP 250 OP 250 PS 637: Mod: GY | Performed by: INTERNAL MEDICINE

## 2017-10-13 RX ORDER — LISINOPRIL 20 MG/1
20 TABLET ORAL 2 TIMES DAILY
Status: DISCONTINUED | OUTPATIENT
Start: 2017-10-13 | End: 2017-10-18

## 2017-10-13 RX ORDER — OLANZAPINE 10 MG/2ML
10 INJECTION, POWDER, FOR SOLUTION INTRAMUSCULAR EVERY 4 HOURS PRN
Status: DISCONTINUED | OUTPATIENT
Start: 2017-10-13 | End: 2017-11-01 | Stop reason: HOSPADM

## 2017-10-13 RX ORDER — OLANZAPINE 5 MG/1
10 TABLET, ORALLY DISINTEGRATING ORAL EVERY 4 HOURS PRN
Status: DISCONTINUED | OUTPATIENT
Start: 2017-10-13 | End: 2017-11-01 | Stop reason: HOSPADM

## 2017-10-13 RX ORDER — QUETIAPINE FUMARATE 25 MG/1
50 TABLET, FILM COATED ORAL
Status: DISCONTINUED | OUTPATIENT
Start: 2017-10-13 | End: 2017-11-01 | Stop reason: HOSPADM

## 2017-10-13 RX ADMIN — LISINOPRIL 20 MG: 20 TABLET ORAL at 19:39

## 2017-10-13 RX ADMIN — METOPROLOL TARTRATE 75 MG: 25 TABLET ORAL at 00:33

## 2017-10-13 RX ADMIN — OLANZAPINE 10 MG: 5 TABLET, ORALLY DISINTEGRATING ORAL at 13:57

## 2017-10-13 RX ADMIN — METOPROLOL TARTRATE 75 MG: 25 TABLET ORAL at 19:38

## 2017-10-13 RX ADMIN — INSULIN GLARGINE 30 UNITS: 100 INJECTION, SOLUTION SUBCUTANEOUS at 21:46

## 2017-10-13 RX ADMIN — METOPROLOL TARTRATE 75 MG: 25 TABLET ORAL at 09:47

## 2017-10-13 RX ADMIN — LABETALOL HYDROCHLORIDE 10 MG: 5 INJECTION, SOLUTION INTRAVENOUS at 02:08

## 2017-10-13 RX ADMIN — INSULIN GLARGINE 15 UNITS: 100 INJECTION, SOLUTION SUBCUTANEOUS at 01:05

## 2017-10-13 RX ADMIN — INSULIN ASPART 5 UNITS: 100 INJECTION, SOLUTION INTRAVENOUS; SUBCUTANEOUS at 01:04

## 2017-10-13 RX ADMIN — INSULIN ASPART 3 UNITS: 100 INJECTION, SOLUTION INTRAVENOUS; SUBCUTANEOUS at 05:05

## 2017-10-13 RX ADMIN — LISINOPRIL 10 MG: 10 TABLET ORAL at 00:33

## 2017-10-13 RX ADMIN — QUETIAPINE FUMARATE 50 MG: 25 TABLET, FILM COATED ORAL at 13:57

## 2017-10-13 NOTE — PROVIDER NOTIFICATION
MD Notification    Notified Person:  MD    Notified Persons Name: Dr ARMINDA Ordonez    Notification Date/Time: 8:19 AM      Notification Interaction:  Text paged Physician    Purpose of Notification:Since pt is on reg diet , writer asking Md to consider  changing insulin/BGM monitoring from q4hr to ac and hs    Orders Received: awaiting reply    Comments:

## 2017-10-13 NOTE — H&P
CHIEF COMPLAINT:  Fall and discharge planning.      HISTORY OF PRESENT ILLNESS:  The patient Amber Hull is a 72-year-old woman with a medical history significant for diabetes mellitus, paroxysmal atrial fibrillation, hypertension and possible dementia.  She was brought to the Emergency Department for evaluation after a fall in which she sustained a scalp laceration.  For further information I reviewed the patient's prior admission summaries, discharge summaries and Emergency Department visit notes and discussed the situation with Dayami with whom the patient lives and with Dr. Xuan Chang who evaluated the patient in the Emergency Department.  The patient is very confused; and, although I interviewed her, her history is not reliable.  According to the patient's friend Dayami, Mrs. Hull was noted to have a head laceration which was bleeding, and 911 was called.  EMS brought her to the hospital.  The patient was home alone, and the fall was unwitnessed.  The patient lives with Dayami who is the daughter of a friend of the patient. The patient does not have any family members.  She has been living with Dayami for more than 15 years. For the last several months she has had gradually worsening memory issues and was in the Emergency Department in November 2016 after she was found walking outside in the cold without shoes.      The patient tells me that today she tripped on her dog, fell and hit her head on her dresser.  She denies loss of consciousness and denies pain elsewhere. She even denies pain at the sutured scalp laceration site.  She denies nausea, vomiting, blurry vision or focal weakness. The patient denies any falls in the past, but according to Dayami she has fallen at home.      Her friend Dayami reports the patient has not been caring for herself.  There is feces all over her room, including her bed sheets and comforter. Over the weekend, she was trying to put the comforter with feces on it into the  dryer without washing it first. Dayami thinks the patient is not able to care for herself, and because Dayami is unable to provide adequate care for the patient as she works.  She was working with Social Service for support and placement, but has not been able to accomplish anything so far.  The patient also is not sharing anything with Dayami lately and is still driving to visit Dayami's mother in her transitional care unit which Dayami feels is not safe for patient.  The patient has sustained several dings on her car.  She refuses to quit driving.      In the Emergency Department the patient was evaluated by Dr. Chang.  Her laboratory workup was significant for an elevated blood sugar of 341 and an elevated creatinine of 1.2 (which was 1.6 in 11/2016).  She was noted to have a scalp laceration which was sutured.  The patient also was given Tdap and is being placed in Observation for discharge planning.      REVIEW OF SYSTEMS:  A 10-point review was done.  The patient denies all symptoms, but there is a question of reliability.      PAST MEDICAL HISTORY:   1.  Group B Strep sepsis.   2.  Hypertension.   3.  Diabetes mellitus.   4.  Paroxysmal atrial fibrillation on anticoagulation.      PAST SURGICAL HISTORY:    Past Surgical History:   Procedure Laterality Date     C TOTAL ABDOM HYSTERECTOMY  9/11/84     HC COLONOSCOPY W BIOPSY  7/20/01     HC CORRECT BUNION,SIMPLE       HC DILATION/CURETTAGE DIAG/THER NON OB  4/7/83     HC EXCIS PRIMARY GANGLION WRIST  3/15/89    (L) dorsal wrist     HC EXCIS RECURRENT GANGLION WRIST  10/20/93    (L) wrist     HC EXCIS RECURRENT GANGLION WRIST  7/11/90    (L) dorsal wrist     HC FLEX SIGMOIDOSCOPY W/WO MARINA SPEC BY BRUSH/WASH  8/16/94     HC REMOVAL GALLBLADDER       HC REVISE MEDIAN N/CARPAL TUNNEL SURG  4/6/92    (L) Carpal Tunnel Release     HC REVISE MEDIAN N/CARPAL TUNNEL SURG  7/11/90    (R) Carpal Tunnel Release     HC TOOTH EXTRACTION W/FORCEP      teeth extracted     LASER  HOLMIUM LITHOTRIPSY URETER(S), INSERT STENT, COMBINED Left 2016    Procedure: COMBINED CYSTOSCOPY, URETEROSCOPY, LASER HOLMIUM LITHOTRIPSY URETER(S), INSERT STENT;  Surgeon: Laith Griffith MD;  Location:  OR        FAMILY HISTORY:  Not pertinent to this presentation.      SOCIAL HISTORY:  The patient has never smoked, does not drink alcohol and denies recreational drug use.  She lives with her friend Dayami.  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  The patient does not have any family members since her father .  She is unmarried and has no children or siblings.  She has been living with Dayami for 15 years.      ALLERGIES:  No known drug allergies.      HOME MEDICATIONS:  The patient was supposed to be taking the following medications, but she has not been taking them for an unknown time as per patient.   1.  Metoprolol 75 mg p.o. twice a day.   2.  Tylenol 650 mg p.o. 3 times a day p.r.n.   3.  MiraLax 17 gm p.o. daily p.r.n.   4.  Apixaban 5 mg p.o. twice a day.   5.  Insulin Glargine 30 units subcutaneously every morning.   6.  Lisinopril 20 mg  p.o. twice a day.   7.  Atorvastatin 10 mg p.o. daily.      PHYSICAL EXAMINATION:   GENERAL:  The patient is alert, awake, oriented to self, knows her date of birth, but otherwise thinks she is in a nursing home in \[facility] [place] [facility]\"" (she actually goes to a [place] transitional care unit to visit Dayami's mother).  She is disoriented to current date.  She is calm, cooperative, follows verbal commands appropriately and does not appear to be in distress.   VITAL SIGNS:  Blood pressure 197/100, heart rate 103, temperature 97.9, respirations 18, pulse oximetry 97% on room air.   HEENT:  Pupils are bilaterally equal, round, reactive to light.  Oral mucosa are moist.  There is an approximately 10 cm long laceration proximal to the right parietal scalp which has been  sutured with good hemostasis.  There is no active bleeding, but there is dried blood on her scalp.   NECK:  Supple.   RESPIRATORY:  Bilateral equal air entry, lungs clear to auscultation, patient remains on room air.   CARDIOVASCULAR:  S1, S2 regular, no murmur, rub or gallop.   ABDOMEN:  Soft, nontender, bowel sounds active.   MUSCULOSKELETAL:  No edema.   NEUROLOGIC:  Cranial nerves II-XII intact, motor strength symmetrical.      LABORATORY AND IMAGING RESULTS:    1.  Sodium 138, potassium 3.6, chloride 104, bicarbonate 24, BUN 20, creatinine 1.42, calcium 8.9, blood sugar 341.     2.  White cell count 9.4, hemoglobin 14.6, hematocrit 41.5, platelets 270.     3.  Urinalysis demonstrates 2-5 white blood cells/hpf, no leukocyte esterase or nitrite.     4.  CT scan of the head is negative for acute intracranial process.      ASSESSMENT AND PLAN: The patient Amber Hull is a 72-year-old woman with hypertension, diabetes and paroxysmal atrial fibrillation who was brought to the Emergency Department for evaluation of a scalp laceration sustained in a fall.   1.  Fall and scalp laceration.  The etiology of the fall is unclear.  The patient says she tripped on her dog and fell; the fall was unwitnessed.  She sustained a scalp laceration which has been sutured, and there is no bleeding now.  There are no neurologic deficits, and the CT scan of her head is negative.  This was likely a mechanical fall.   - The patient received Tdap vaccine in the Emergency Department.       - She will be placed in Observation.     2.  Worsening cognitive function and inability to care for self.  As stated above the patient has been living with Dayami, a family friend.  The patient does not have any relation with Dayami.  She has had gradual decline in her cognitive function suggestive of worsening dementia.  Dayami thinks that this is the best she has been and says she is worse at times in terms of her memory and cognitive issues.  Despite  this the patient continues to drive and is not willing to surrender her driving license and stop driving.  Dayami does not think she will be able to continue to help care for Ms. Hull.   - Will consult Psychiatry.     - The patient likely needs to be discharged to a long-term memory care unit.     - Will ask Social Service to follow up on this for safe discharge planning.     3.  Diabetes mellitus Type 2.  The patient was supposed to be taking Insulin Glargine 30 units daily, but she has not been taking it for an unknown period of time.  Blood sugar is currently 341.     - I will re-start her insulin at 20 units daily for now and up-titrate as needed.     - I have ordered medium-intensity sliding scale insulin.   4.  Hypertension and paroxysmal atrial fibrillation.  The patient is supposed to be taking Lisinopril 20 mg p.o. twice a day and metoprolol 75 mg b.i.d., but she is not taking these medications.     - It is recommended she take Apixaban 5 mg p.o. twice a day which she also has not taken for an unknown period of time.   - Due to her laceration which was just sutured we will hold anticoagulation for another 24 hours.   - After her fall and with her decreased cognitive function I feel it is likely not safe to continue anticoagulation in the future.   - We may have to consider changing to just full-dose aspirin only.     - I will resume metoprolol at 75 mg p.o. b.i.d.    - I will decrease her lisinopril to 10 mg twice a day to start with.     - Since she has not taken these medications for an unknown period of time and with her risk of hypotension, we will monitor her blood pressure and will resume her [id number] dose of lisinopril if needed.     - For now I have ordered p.r.n. hydralazine and labetalol IV for high blood pressure with parameters.   5.  Deep venous thrombosis prophylaxis with pneumatic compression devices.   6.  Code status.  The patient is confused and not able to participate in the  conversation.  Her friend Dayami does not think the patient has a Living Will.  For now she will be full code by default, but this needs to be directly addressed at some point with the patient.      7.  Disposition.     - The patient will see Social Service for discharge planning.     - She is admitted to [facility] Observation.   - The above plan was discussed with the patient and with Dayami.         WENDI STOCKTON MD             D: 10/12/2017 23:10   T: 10/13/2017 01:11   MT: [id number]      Name:     ELSA BISHOP   MRN:      [id number]        Account:      [id number]   :      1945           Admitted:     [id number]      Document: [id number]       cc: Karel [facility]

## 2017-10-13 NOTE — PLAN OF CARE
Problem: Patient Care Overview  Goal: Plan of Care/Patient Progress Review  Alert, Oriented to self only.   Denies pain. Hypertensive, BP stabilized after scheduled bp meds and prn labetalol were given.  Large scalp laceration, closed with sutures/staples. No oozing, well approximated. Reddened cristy area, pt refused hygiene care. Reddened areas under breasts and under skin folds.  Incontinent of urine, ambulates to bathroom.  A1, Impulsive, and agitated at times, does not call appropriately. DC pending SW, Psych and PT input. Will likely need long term memory care unit.

## 2017-10-13 NOTE — PHARMACY-ADMISSION MEDICATION HISTORY
Admission Medication History    Admission medication history interview status for the 10/12/2017 admission is complete.     Family member reports patient has been taking no medications.  Prior medl ist left in Epic at MD request .    Prior to Admission medications    Medication Sig Last Dose Taking? Auth Provider   Metoprolol Tartrate 75 MG TABS Take 75 mg by mouth 2 times daily More than a month at Unknown time  Reported, Patient   acetaminophen (TYLENOL) 325 MG tablet Take 650 mg by mouth 3 times daily as needed for mild pain More than a month at Unknown time  Reported, Patient   polyethylene glycol (MIRALAX/GLYCOLAX) powder Take 17 g by mouth daily as needed for constipation More than a month at Unknown time  Reported, Patient   apixaban ANTICOAGULANT (ELIQUIS) 5 MG tablet Take 1 tablet (5 mg) by mouth 2 times daily More than a month at Unknown time  Dg Collazo MD   insulin glargine (LANTUS) 100 UNIT/ML PEN Inject 30 Units Subcutaneous every morning Dose obtained via Care Everywhere, pt not able to provide accurate history. More than a month at Unknown time  Reported, Patient   LISINOPRIL PO Take 20 mg by mouth 2 times daily  More than a month at Unknown time  Unknown, Entered By History   ATORVASTATIN CALCIUM PO Take 10 mg by mouth daily  More than a month at Unknown time  Unknown, Entered By History       Aldo Webber, ChaparroD

## 2017-10-13 NOTE — ED NOTES
"Person who lives with pt has arrived. Pt reports that pt cannot take care of herself at home, \"drinks soda when she has diabetes\" and \"put detergent in the dryer\" and \"can hardly walk.\"  Pt requests SW and the MD. Pt reports that [facility]  are involved. MD aware.  "

## 2017-10-13 NOTE — PROGRESS NOTES
Care Transition Initial Assessment - DAMIAN  Reason For Consult: discharge planning  Met with: chart review   Active Problems:    Fall         DATA  Lives With: friend(s)  Living Arrangements: house  Description of Support System: Involved  Who is your support system?: Other (specify) (friend)  Support Assessment: Adequate social supports.   Identified issues/concerns regarding health management: SW following for d/c needs  Quality Of Family Relationships: involved    ASSESSMENT  Cognitive Status:  Alert and oriented to self only per RN note  Concerns to be addressed: Patient is a 72 year old female who was admitted to the hospital for a fall. Prior to hospitalization patient was living at friends home. DAMIAN received call from Miroslava Camara [phone] to discuss vikki's open investigation with [facility]. Miroslava stated that patient is continuing to drive regardless of MD recommendation, puts liquid detergent into the dryer, uses remote control as phone, and etc. Patient's friend who she resides with is evicting her from her home in 30 days. Patient will need placement once she is evicted from home and [facility] is planning to help her find new housing. Psych has been consulted as patient's cognition seems to be impaired as well as PT/OT.  will send [facility] updated paperwork regarding patient's hospital stay once  is authorized to do so. DAMIAN will continue to follow and help assist for patient to have safe d/c.    ADDENDUM  I: DAMIAN was informed that [facility] will need to send medical record to [facility]. DAMIAN sent request of medical record to [facility] and updated Miroslava. DAMIAN will need to update Miroslava on patient's d/c plan when ready.     PLAN  Financial costs for the patient includes   Patient given options and choices for discharge   Patient/family is agreeable to the plan?    Patient Goals and Preferences: TBD  Patient anticipates discharging to:  [facility]   [phone]

## 2017-10-13 NOTE — CONSULTS
[facility] Initial Psychiatric Consult Note      TIME SPENT IN PSYCHIATRY INITIAL CONSULT: 55 MINUTES    Consult ordered by: Rj Peres MD  Reason: Delirium     Initial History     The patient's care was discussed, patient seen and chart notes were reviewed.    Patient examined for psychiatric consultation.     IDENTIFICATION    The patient Amber Hull is a 72-year-old woman with a medical history significant for diabetes mellitus, paroxysmal atrial fibrillation, hypertension, and possible Alzheimer's who presented to the [facility] ED due to a scalp laceration. Pt sees PCP Karel Davidson. Pt seen on 88 for delirium.    HISTORY OF PRESENT ILLNESS    In the ED the Pt's friend Dayami, with whom Pt lives, reported she called 911 when she found Pt with a bleeding head injury. Apparently Pt sustained an unwitnessed fall while home alone. For the last several months Pt has had worsening memory problems, and presented to the ED in November 2016 after she was found walking outside in the cold without shoes. In the ED the Pt reported that she tripped on her dog, fell, and hit her head on a dresser. The patient denied any falls in the past, but according to Dayami she has fallen at home before. Dayami reported the patient has not been caring for herself. There is feces all over her room, including on her bed sheets and comforter. Over the weekend Pt was trying to put the soiled comforter into the dryer without washing it first. Dayami thinks the patient is not able to care for herself, and because she works Dayami is unable to provide adequate care for the patient despite living in the same home. Dayami was working with  for support and placement, but she has not been able to accomplish anything so far. The patient has also stopped sharing anything with Dayami lately, and is still driving. Dayami noted that the patient's car has sustained several dings recently, but Pt refuses to quit driving.  "    Pt is minimizing her memory impairment, she states she put a blanket with feces on it in the dryer because she did not realize there was feces on it. This contradict's Dayami's statement that there is feces not just on the comforter but all over Pt's bedroom. The paramedics also reported in the ED that Pt's house was a mess and that there was feces all around her room. According to Dayami Pt invited a friend to her house a few weeks ago, and the friend refused to come inside because the smell of feces was so strong. Pt is not delirious currently, though her mood is elevated and expansive, and she appears somewhat confused due to memory impairment. Pt laughs inappropriately at times, though is generally coherent. She scored poorly on the Mini Mental Status Exam demonstrating significant impairment. She is clearly a vulnerable adult who is unable to care for herself. She will need a referral to assisted living or memory care. She ageed to be placed in AL, though she will likely forget that she agreed to this. Pt became agitated later in the day. Ordered Seroquel 50mg [id number] PRN, Zyprexa ODT 10mg [id number] PRN, and Zyprexa IM 10mg [id number] PRN.     Mini Mental Status Exam: Pt was unable to recount the date and year, though she was aware that it is October. She was aware that she is at [facility] in [place], [place]. She was unable to recall what [facility] she lives in ([place]). She repeated \"apple, sherman, table;\" She was unable to recall these words 5 minutes later. Identified a ring and glasses. Repeated \"no ifs, ands, or buts\" appropriately. She read \"close your eyes\" and closed her eyes in response. She was unable to start at 100 and subtract by 7s, she stated \"the first number is 7, you have 100, and 7. I really can't do this it would take a long time.\" She was able to fold a piece of paper in half. She recalls that the POTUS is Trump, though she was unable to recall both president Obama and president " "Ramesh, and she thought the first  president was Arthur.     CHEMICAL DEPENDENCY HISTORY    Pt has never been to CD treatment. The patient has never smoked, does not drink alcohol, and denies recreational drug use.    PAST PSYCHIATRIC HISTORY    No history of psychiatric treatment, and no reported history of symptoms of mental illness.     FAMILY HISTORY    No history of mental illness or CD reported.     SOCIAL HISTORY    Pt lives on [facility] in [place], [place]. She is single with no children. She was  once for 10 years, they  because \"sometimes you just don't see eye to eye.\" She grew up in [place] as an only child. She graduated High School and attended college in [place]. 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 She lives with her friend Dayami. 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 The patient does not have any family members since her father . She is unmarried and has no children or siblings. She has been living with Dayami for 15 years.      Medications     Prescriptions Prior to Admission   Medication Sig Dispense Refill Last Dose     Metoprolol Tartrate 75 MG TABS Take 75 mg by mouth 2 times daily   More than a month at Unknown time     acetaminophen (TYLENOL) 325 MG tablet Take 650 mg by mouth 3 times daily as needed for mild pain   More than a month at Unknown time     polyethylene glycol (MIRALAX/GLYCOLAX) powder Take 17 g by mouth daily as needed for constipation   More than a month at Unknown time     apixaban ANTICOAGULANT (ELIQUIS) 5 MG tablet Take 1 tablet (5 mg) by mouth 2 times daily   More than a month at Unknown time     insulin glargine (LANTUS) 100 UNIT/ML PEN Inject 30 Units Subcutaneous every morning Dose obtained via Care Everywhere, pt not able to provide accurate history.   More than a month at Unknown time     LISINOPRIL PO Take 20 mg by " "mouth 2 times daily    More than a month at Unknown time     ATORVASTATIN CALCIUM PO Take 10 mg by mouth daily    More than a month at Unknown time       Scheduled Medications    insulin aspart  1-10 Units Subcutaneous TID AC     insulin aspart  1-7 Units Subcutaneous At Bedtime     insulin glargine  30 Units Subcutaneous At Bedtime     lisinopril (PRINIVIL/ZESTRIL) tablet 20 mg  20 mg Oral BID     metoprolol  75 mg Oral BID     PRNs:  naloxone, acetaminophen, acetaminophen, senna-docusate, bisacodyl, ondansetron **OR** ondansetron, traMADol, glucose **OR** dextrose **OR** glucagon, labetalol, hydrALAZINE      Allergies        Allergies   Allergen Reactions     No Known Allergies         Previous Medical History     Past Medical History:   Diagnosis Date     Diabetes (H)      Endometrial hyperplasia      Endometriosis, site unspecified      Excessive or frequent menstruation      Ganglion, unspecified      Other and unspecified ovarian cyst      Septicemia due to group B Streptococcus (H) 5/29/2016        Medical Review of Systems     BP (!) 178/91 (BP Location: Right arm)  Temp 97.9  F (36.6  C) (Oral)  Resp 16  Ht 1.651 m (5' 5\")  Wt 94.1 kg (207 lb 6.4 oz)  SpO2 95%  BMI 34.51 kg/m2  Body mass index is 34.51 kg/(m^2).    Previous 10-point ROS completed by Rj Peres MD on 10/12/2017 reviewed by Ubaldo Beckford MD on October 13, 2017 and is unchanged except for those problems mentioned within the HPI.      Mental Status Examination     Appearance Sitting in chair, dressed in gown. Appears stated age.   Attitude Cooperative, laughing inappropriately   Orientation Oriented to person and place only   Eye Contact Poor   Speech Regular rate, rhythm, volume and tone   Language Normal   Psychomotor Behavior Normal   Thought Process Confused and disoriented   Associations Intact   Thought Content Patient is currently negative for suicide ideation, negative for plan or intent, able to contract no self harm " and identify barriers to suicide.  Negative for obsessions, compulsions or psychosis.      Mood Elevated   Affect Expansive   Fund of Knowledge Limited   Insight Impaired   Judgement Impaired   Attention Span & Concentration Impaired   Recent & Remote Memory Very impaired   Gait Normal   Muscle Tone Intact      Labs     Labs reviewed.  Recent Results (from the past 24 hour(s))   CBC with platelets + differential    Collection Time: 10/12/17  8:00 PM   Result Value Ref Range    WBC 9.4 4.0 - 11.0 10e9/L    RBC Count 4.82 3.8 - 5.2 10e12/L    Hemoglobin 14.6 11.7 - 15.7 g/dL    Hematocrit 41.5 35.0 - 47.0 %    MCV 86 78 - 100 fl    MCH 30.3 26.5 - 33.0 pg    MCHC 35.2 31.5 - 36.5 g/dL    RDW 14.0 10.0 - 15.0 %    Platelet Count 270 150 - 450 10e9/L    Diff Method Automated Method     % Neutrophils 71.9 %    % Lymphocytes 20.7 %    % Monocytes 5.0 %    % Eosinophils 1.9 %    % Basophils 0.2 %    % Immature Granulocytes 0.3 %    Nucleated RBCs 0 0 /100    Absolute Neutrophil 6.8 1.6 - 8.3 10e9/L    Absolute Lymphocytes 2.0 0.8 - 5.3 10e9/L    Absolute Monocytes 0.5 0.0 - 1.3 10e9/L    Absolute Eosinophils 0.2 0.0 - 0.7 10e9/L    Absolute Basophils 0.0 0.0 - 0.2 10e9/L    Abs Immature Granulocytes 0.0 0 - 0.4 10e9/L    Absolute Nucleated RBC 0.0    Alcohol level blood    Collection Time: 10/12/17  8:00 PM   Result Value Ref Range    Ethanol g/dL <0.01 <0.01 g/dL   Basic metabolic panel    Collection Time: 10/12/17  8:00 PM   Result Value Ref Range    Sodium 138 133 - 144 mmol/L    Potassium 3.6 3.4 - 5.3 mmol/L    Chloride 104 94 - 109 mmol/L    Carbon Dioxide 24 20 - 32 mmol/L    Anion Gap 10 3 - 14 mmol/L    Glucose 341 (H) 70 - 99 mg/dL    Urea Nitrogen 20 7 - 30 mg/dL    Creatinine 1.42 (H) 0.52 - 1.04 mg/dL    GFR Estimate 36 (L) >60 mL/min/1.7m2    GFR Estimate If Black 44 (L) >60 mL/min/1.7m2    Calcium 8.9 8.5 - 10.1 mg/dL   INR    Collection Time: 10/12/17  8:00 PM   Result Value Ref Range    INR 0.97 0.86 -  1.14   UA reflex to Microscopic and Culture    Collection Time: 10/12/17  9:30 PM   Result Value Ref Range    Color Urine Yellow     Appearance Urine Slightly Cloudy     Glucose Urine >=1000 (A) NEG^Negative mg/dL    Bilirubin Urine Negative NEG^Negative    Ketones Urine Negative NEG^Negative mg/dL    Specific Gravity Urine 1.020 1.003 - 1.035    Blood Urine Small (A) NEG^Negative    pH Urine 6.0 5.0 - 7.0 pH    Protein Albumin Urine >=300 (A) NEG^Negative mg/dL    Urobilinogen Urine 0.2 0.2 - 1.0 EU/dL    Nitrite Urine Negative NEG^Negative    Leukocyte Esterase Urine Negative NEG^Negative    Source Catheterized Urine    Urine Microscopic    Collection Time: 10/12/17  9:30 PM   Result Value Ref Range    WBC Urine 2-5 (A) OTO2^O - 2 /HPF    RBC Urine O - 2 OTO2^O - 2 /HPF    Hyaline Casts O - 2 OTO2^O - 2 /LPF    Squamous Epithelial /LPF Urine Many (A) FEW^Few /LPF    Amorphous Crystals Moderate (A) NEG^Negative /HPF   EKG 12 lead    Collection Time: 10/12/17  9:47 PM   Result Value Ref Range    Interpretation ECG Click View Image link to view waveform and result    Glucose by meter    Collection Time: 10/13/17 12:57 AM   Result Value Ref Range    Glucose 319 (H) 70 - 99 mg/dL   Glucose by meter    Collection Time: 10/13/17  5:03 AM   Result Value Ref Range    Glucose 243 (H) 70 - 99 mg/dL   Hemoglobin and hematocrit    Collection Time: 10/13/17  7:25 AM   Result Value Ref Range    Hemoglobin 13.4 11.7 - 15.7 g/dL    Hematocrit 39.2 35.0 - 47.0 %   INR    Collection Time: 10/13/17  7:25 AM   Result Value Ref Range    INR 1.04 0.86 - 1.14   Hemoglobin A1c    Collection Time: 10/13/17  7:25 AM   Result Value Ref Range    Hemoglobin A1C 12.8 (H) 4.3 - 6.0 %   Glucose by meter    Collection Time: 10/13/17  8:03 AM   Result Value Ref Range    Glucose 291 (H) 70 - 99 mg/dL        Impression     The patient Amber Hull is a 72-year-old woman with a medical history significant for diabetes mellitus, paroxysmal atrial  fibrillation, hypertension, and possible Alzheimer's who presented to the [facility] ED due to a scalp laceration. She has significant memory impairment. She scored poorly on the Mini Mental Status Exam, indicating significant memory impairment. According to EMS and a friend named Dayami with whom Pt lives, Pt's house is in disarray and there is feces everywhere. Her memory has been deteriorating over time according to Dayami; in November 2016 she presented to the ED after being found wandering around outside in the cold without shoes. On interview she was not delirious, but was somewhat confused and disoriented. She was unable to recount the date or year, and did not recall general information such as who Presidents Chandler and Obmarj are. Pt became agitated later in the day prompting Dr. Beckford to order Seroquel 50mg [id number] PRN, Zyprexa ODT 10mg [id number] PRN, and Zyprexa IM 10mg [id number] PRN. She will need a referral to assisted living or memory care. Re-consult tomorrow.      Diagnoses     1. Neurocognitive deficit, consistent with Alzheimer's     Plan     1. Explained side effects, benefits and complications of medications to the patient.  2. Medication Changes: begin Seroquel 50mg [id number] PRN, Zyprexa ODT 10mg [id number] PRN, and Zyprexa IM 10mg [id number] PRN - all for agitation  3. Discussed treatment plan with patient and team.  4. Pt will need a referral to assisted living or memory care  5. Do not transfer Pt to [facility] [address] before consulting Dr. Beckford  6. Re-consult Psychiatry tomorrow      TIME SPENT IN PSYCHIATRY INITIAL CONSULT: 55 MINUTES     Attestation:   Patient has been seen and evaluated by me, Ubaldo Beckford MD.    Patient ID:  Name: Amber Hull   MRN: [id number]  Admission: 10/12/2017   YOB: 1945

## 2017-10-13 NOTE — ED NOTES
"[facility]  ED Nurse Handoff Report    ED Chief complaint: Fall (Pt had fall at home this morning. Head lac noted on the superior head R side 10 cm. No active bleeding. Pt hit head on hooks on wall per EMS. Pt reports hitting head on edge of window. )      ED Diagnosis:   Final diagnoses:   Closed head injury, initial encounter   Laceration of scalp, initial encounter   Confusion   Adult failure to thrive       Code Status: See MD note    Allergies:   Allergies   Allergen Reactions     No Known Allergies        Activity level - Baseline/Home:  Stand with Assist    Activity Level - Current:   Stand with Assist     Needed?: No    Isolation: No  Infection: Not Applicable    Bariatric?: Yes    Vital Signs:   Vitals:    10/12/17 1832 10/12/17 1833 10/12/17 1835 10/12/17 1845   BP: (!) 151/92  (!) 151/92    Resp:   18    Temp:   97.9  F (36.6  C)    TempSrc:   Oral    SpO2:  96% 96% 94%   Weight:   90.7 kg (200 lb)    Height:   1.651 m (5' 5\")        Cardiac Rhythm: ,        Pain level: 0-10 Pain Scale: 0    Is this patient confused?: Yes    Patient Report: Initial Complaint: A/O to person and situation, but denial of severity of home condition (stool on ground and filthy bedroom).  She is living with her friend's daughter, Dayami, who is very overwhelmed.  Patient is stubborn, but cooperative.  Long head lac: sutured.  St cath UA sent: no UTI evidence.  Fecal matter cleaned off hands, and feet.  Perianal area red and excoriated.  Blood work ok with elevated glucose; 1 L NS given.    Focused Assessment: see above  Tests Performed: see above  Abnormal Results: see above  Treatments provided: see above    Family Comments: see above    OBS brochure/video discussed/provided to patient: Refused    ED Medications:   Medications   0.9% sodium chloride BOLUS (1,000 mLs Intravenous New Bag 10/12/17 5356)   Tdap (tetanus-diphtheria-acell pertussis) (ADACEL) injection 0.5 mL (0.5 mLs Intramuscular Given " 10/12/17 2052)       Drips infusing?:  No      ED NURSE PHONE NUMBER: [phone]

## 2017-10-13 NOTE — PROVIDER NOTIFICATION
"MD Notification    Notified Person:  MD    Notified Persons Name: Dr Beckford/Psych    Notification Date/Time: now at  1:19pm  and previously called approx1 hour ago  Notification Interaction: Message left with office staff    Purpose of Notification:After pt was reportedly seen by Dr. Beckford and his scribe (Carlos SHINE) pt has become   Increasing restlessness agitation, confusion. She adamantly stated that she was told by them that she \"could leave today\". She has walked in the halls waving the admission packet of general info stating it was given to her by the doctors and is the proof that she could go home now. Disorientated to time, situation and states I don't care about anything and  do what I want when I want no matter what you people say\". Increasingly uncooperative. Flight risk  and flight risk   Attempting to walk off the unit and leave building. Has no anti-anxiety, sedative meds or any psych meds yet ordered. The physician note by his PA remains incomplete without clear plan how to treat pt's symptoms.    Orders Received:awaiting reply. This is second attempt to contact MD    Comments: I was told by charge nurse that pt's friend has contacted community  to decalre pt vulnerable adult.       1332 addendum: Dr Beckford was reached. Anticipated  Zyprexa and Seroquel will be ordered.     1500 Addendum: Confusion incr pt remained very restless, confused x3, frustrated , uncooperative, and ambulated in spencer at least x8 this shift since she couldn't relax in her room once psych left the room earlier today.  Finally able to reach psych -  his scribe arrived on unit and eventually orders were obtained for Zyprexa and Seroquel which were both given at 1357. Dr Beckford stated pt has \"significant Dementia, thus can not be transferred to mental health unit. Thus pt will require dc to geriatric memory care locked unit for disposition when is medically stable for discharge. Sw following  "

## 2017-10-13 NOTE — PLAN OF CARE
"Problem: Patient Care Overview  Goal: Plan of Care/Patient Progress Review  OT: Order received, chart reviewed. Initiated OT eval this morning; however, Psych MD arrived for visit. Re-attempted OT eval this afternoon; however, pt easily agitated making fist at OT and slamming phone down on table stating \"I'm fine and I want to talk to that man that was in here before! I'm going home! Dayami is out of the picture so it doesn't matter what happens with her. I\"m just going home on my own!\" Pt very confused.       "

## 2017-10-13 NOTE — PROGRESS NOTES
[facility]    Hospitalist Progress Note    Assessment & Plan   Amber Hull is a 72 year old female with PMHx of hypertension, paroxysmal afib and DM with suspected cognitive decline and inability to care for herself who was admitted under observation on 10/12/2017 for evaluation after a fall which resulted in a scalp laceration.     Fall and scalp laceration:    The etiology of the fall is unclear -- patient says she tripped on her dog and fell; the fall was unwitnessed.  She sustained a scalp laceration that was sutured in the ED. She had no neurologic deficits. CT scan of her head was negative.  This was likely a mechanical fall. Received Tdap vaccine in the ED.   -- admitted to assist with placement as below  -- PT consulted      Worsening cognitive function and inability to care for self:  Patient had been living with Dayami, a family friend. The patient does not have any relations.  She has had gradual decline in her cognitive function suggestive of worsening dementia.  Dayami thinks that this is the best she has been and says she is worse at times in terms of her memory and cognitive issues.  Despite this the patient continues to drive and is not willing to surrender her driving license and stop driving.  Dayami does not think she will be able to continue to help care for Ms. Hull.   -- psych consulted  -- OT consulted for cog eval  -- likely needs long term care at discharge -- SW also consulted to assist with arranging safe discharge plan      DM II, Uncontrolled:  A1C 12.8 this stay. No known/reported complications from diabetes.  Patient was supposed ot have been taking Lantus 30U daily but hasn't been taking it for some time now.    on presentation. Given dose of Lantus 15U HS on night of admission.    Recent Labs  Lab 10/13/17  0803 10/13/17  0503 10/13/17  0057 10/12/17  2000   GLC  --   --   --  341*   * 243* 319*  --      -- BG uncontrolled today -- will increase  Lantus to 30U daily  -- have changed SSI from med dose to high dose    Hypertension and paroxysmal atrial fibrillation:  Was supposed to have been taking lisinopril 20mg BID and metoprolol 75mg BID bus had not been taking either of these. Had also been prescribed Apixaban 5mg BID which she has not been taking. Note her CHADs2 VASc score is 4, indicating a 4% yearly stroke risk.  -- lisinopril resumed on admission at decreased dose of 10mg BID, BPs high this morning so will increase back to 20mg BID  -- metoprolol 75mg BID resumed  -- also has prns available for SBP >180  -- holding anticoagulation in the setting of her recent scalp laceration but given her suspected underlying cognitive dysfunction and reported fall it is likely not safe to cont anticoagulation in the future, will consider changing to full-dose aspirin    FEN: no IVFs, lytes stable, regular diet  DVT Prophylaxis: PCDs   Code Status: Full Code -- patient was notably confused on admission and unable to participate in conversation on code status thus she was made full code by default. Her friend Dayami does not believe she has a living will.     Disposition: Pending arrangement of placement.    Mariaelena Cassidycalvin Ordonez    Interval History   Seen this morning. Was initially sitting quietly in chair. Tells me she wants to go home today. Denies complaints of pain. Oriented to self, knows she's in a hospital but can't tell me which one, does not know the month or year. Discussed plan for the day -- PT consult, psych consult, SW consult, and need to help arrange alertnate living arrangements. During the course of this conversation patient became upset and tearful, saying she had other options of friends to live with and wanted to leave the hospital today.     -Data reviewed today: I reviewed all new labs and imaging results over the last 24 hours. I personally reviewed no images or EKG's today.    Physical Exam   Temp: 97.9  F (36.6  C) Temp src: Oral BP: (!)  178/91   Heart Rate: 71 Resp: 16 SpO2: 95 % O2 Device: None (Room air)    Vitals:    10/12/17 1835 10/12/17 2333   Weight: 90.7 kg (200 lb) 94.1 kg (207 lb 6.4 oz)     Vital Signs with Ranges  Temp:  [97.5  F (36.4  C)-97.9  F (36.6  C)] 97.9  F (36.6  C)  Heart Rate:  [] 71  Resp:  [16-18] 16  BP: (137-198)/() 178/91  SpO2:  [93 %-97 %] 95 %       Constitutional: Resting comfortably in chair, quickly becomes defensive and tearful when told she can't leave hospital today  Respiratory: CTAB, no wheeze/rales/rhonchi  Cardiovascular: HRRR, no MGR  GI: S, NT, ND, +BS  Skin/Integumen: scalp incision healing well -- no oozing or bleding  Other:      Medications        insulin glargine  15 Units Subcutaneous At Bedtime     metoprolol  75 mg Oral BID     lisinopril (PRINIVIL/ZESTRIL) tablet 10 mg  10 mg Oral BID     insulin aspart  1-6 Units Subcutaneous Q4H       Data     Recent Labs  Lab 10/13/17  0725 10/12/17  2000   WBC  --  9.4   HGB 13.4 14.6   MCV  --  86   PLT  --  270   INR 1.04 0.97   NA  --  138   POTASSIUM  --  3.6   CHLORIDE  --  104   CO2  --  24   BUN  --  20   CR  --  1.42*   ANIONGAP  --  10   IDANIA  --  8.9   GLC  --  341*       Recent Results (from the past 24 hour(s))   Head CT w/o contrast    Narrative    CT HEAD W/O CONTRAST   10/12/2017 7:02 PM     HISTORY: check for traumatic bleed or skull fracture or scalp wound  FB, fell and hit head with large right parietal scalp laceration    TECHNIQUE: Axial images of the head without IV contrast material.  Radiation dose for this scan was reduced using automated exposure  control, adjustment of the mA and/or kV according to patient size, or  iterative reconstruction technique.    COMPARISON: CT dated 11/22/2016    FINDINGS:  There is generalized atrophy of the brain.  Areas of low  attenuation are present in the white matter of the cerebral  hemispheres that are consistent with small vessel ischemic disease in  this age patient. Again noted is a  calcified or ossified meningioma  versus an osteoma off the inner table of the skull in the right  parietal region. This is unchanged.. Left sphenoid sinus is now  completely opacified. There is sclerosis and thickening of the walls  of the sinus consistent with chronic osteitis.. Soft tissue swelling  is seen of the right frontal region. Small amount of subcutaneous  gas..      Impression    IMPRESSION:   1. No intracranial hemorrhage or skull fractures are seen.  2. Age related changes including diffuse brain atrophy.  White matter  changes consistent with small vessel ischemic disease.    DANYELLE VALDEZ MD

## 2017-10-13 NOTE — PLAN OF CARE
Problem: Patient Care Overview  Goal: Plan of Care/Patient Progress Review  PT: PT orders received, chart reviewed. Pt admitted under OBS status after an unwitnessed fall at home when she tripped over her dog. Notes also indicate significant concerns with memory and cognition, pt is still driving. OT has been ordered to assess cognition to determine level of assist/supervision needed. Will hold PT eval at this time pending OT assessment and recommendations.

## 2017-10-14 ENCOUNTER — APPOINTMENT (OUTPATIENT)
Dept: OCCUPATIONAL THERAPY | Facility: CLINIC | Age: 72
End: 2017-10-14
Attending: INTERNAL MEDICINE
Payer: COMMERCIAL

## 2017-10-14 LAB
ANION GAP SERPL CALCULATED.3IONS-SCNC: 9 MMOL/L (ref 3–14)
BACTERIA SPEC CULT: ABNORMAL
BUN SERPL-MCNC: 23 MG/DL (ref 7–30)
CALCIUM SERPL-MCNC: 8.4 MG/DL (ref 8.5–10.1)
CHLORIDE SERPL-SCNC: 111 MMOL/L (ref 94–109)
CO2 SERPL-SCNC: 25 MMOL/L (ref 20–32)
CREAT SERPL-MCNC: 1.38 MG/DL (ref 0.52–1.04)
GFR SERPL CREATININE-BSD FRML MDRD: 38 ML/MIN/1.7M2
GLUCOSE BLDC GLUCOMTR-MCNC: 167 MG/DL (ref 70–99)
GLUCOSE BLDC GLUCOMTR-MCNC: 174 MG/DL (ref 70–99)
GLUCOSE BLDC GLUCOMTR-MCNC: 238 MG/DL (ref 70–99)
GLUCOSE BLDC GLUCOMTR-MCNC: 254 MG/DL (ref 70–99)
GLUCOSE SERPL-MCNC: 191 MG/DL (ref 70–99)
POTASSIUM SERPL-SCNC: 3.6 MMOL/L (ref 3.4–5.3)
SODIUM SERPL-SCNC: 145 MMOL/L (ref 133–144)
SPECIMEN SOURCE: ABNORMAL

## 2017-10-14 PROCEDURE — 99232 SBSQ HOSP IP/OBS MODERATE 35: CPT | Performed by: PSYCHIATRY & NEUROLOGY

## 2017-10-14 PROCEDURE — A9270 NON-COVERED ITEM OR SERVICE: HCPCS | Mod: GY | Performed by: PSYCHIATRY & NEUROLOGY

## 2017-10-14 PROCEDURE — 40000893 ZZH STATISTIC PT IP EVAL DEFER: Performed by: PHYSICAL THERAPIST

## 2017-10-14 PROCEDURE — A9270 NON-COVERED ITEM OR SERVICE: HCPCS | Mod: GY | Performed by: HOSPITALIST

## 2017-10-14 PROCEDURE — A9270 NON-COVERED ITEM OR SERVICE: HCPCS | Mod: GY | Performed by: INTERNAL MEDICINE

## 2017-10-14 PROCEDURE — 25000132 ZZH RX MED GY IP 250 OP 250 PS 637: Mod: GY | Performed by: INTERNAL MEDICINE

## 2017-10-14 PROCEDURE — 99225 ZZC SUBSEQUENT OBSERVATION CARE,LEVEL II: CPT | Performed by: INTERNAL MEDICINE

## 2017-10-14 PROCEDURE — 25000132 ZZH RX MED GY IP 250 OP 250 PS 637: Mod: GY | Performed by: HOSPITALIST

## 2017-10-14 PROCEDURE — 25000131 ZZH RX MED GY IP 250 OP 636 PS 637: Performed by: INTERNAL MEDICINE

## 2017-10-14 PROCEDURE — 96372 THER/PROPH/DIAG INJ SC/IM: CPT

## 2017-10-14 PROCEDURE — 25000132 ZZH RX MED GY IP 250 OP 250 PS 637: Performed by: PSYCHIATRY & NEUROLOGY

## 2017-10-14 PROCEDURE — 97166 OT EVAL MOD COMPLEX 45 MIN: CPT | Mod: GO | Performed by: OCCUPATIONAL THERAPIST

## 2017-10-14 PROCEDURE — 36415 COLL VENOUS BLD VENIPUNCTURE: CPT | Performed by: INTERNAL MEDICINE

## 2017-10-14 PROCEDURE — G0378 HOSPITAL OBSERVATION PER HR: HCPCS

## 2017-10-14 PROCEDURE — 80048 BASIC METABOLIC PNL TOTAL CA: CPT | Performed by: INTERNAL MEDICINE

## 2017-10-14 PROCEDURE — 40000133 ZZH STATISTIC OT WARD VISIT: Performed by: OCCUPATIONAL THERAPIST

## 2017-10-14 PROCEDURE — 40000915 ZZH STATISTIC SITTER, EVENING HOURS

## 2017-10-14 PROCEDURE — 00000146 ZZHCL STATISTIC GLUCOSE BY METER IP

## 2017-10-14 PROCEDURE — 40000916 ZZH STATISTIC SITTER, NIGHT HOURS

## 2017-10-14 RX ADMIN — QUETIAPINE FUMARATE 50 MG: 25 TABLET, FILM COATED ORAL at 19:52

## 2017-10-14 RX ADMIN — LISINOPRIL 20 MG: 20 TABLET ORAL at 08:02

## 2017-10-14 RX ADMIN — METOPROLOL TARTRATE 75 MG: 25 TABLET ORAL at 12:32

## 2017-10-14 RX ADMIN — LISINOPRIL 20 MG: 20 TABLET ORAL at 19:52

## 2017-10-14 RX ADMIN — QUETIAPINE FUMARATE 50 MG: 25 TABLET, FILM COATED ORAL at 01:30

## 2017-10-14 RX ADMIN — INSULIN GLARGINE 30 UNITS: 100 INJECTION, SOLUTION SUBCUTANEOUS at 22:04

## 2017-10-14 RX ADMIN — METOPROLOL TARTRATE 75 MG: 25 TABLET ORAL at 19:52

## 2017-10-14 NOTE — PLAN OF CARE
Problem: Patient Care Overview  Goal: Plan of Care/Patient Progress Review  Outcome: No Change  A&O to self only, not able to reorient at times. VSS. Up with 1/SBA and walker. Slow to move and unsteady on feet. Bedside attendant this shift. Seroquel and Zyprexa available PRN. BS high though out day due to refusing insulin, given during eves. Potential for long arm sit during nights. Will continue to monitor.

## 2017-10-14 NOTE — PLAN OF CARE
Problem: Patient Care Overview  Goal: Plan of Care/Patient Progress Review  Outcome: No Change  Patient alert to self. VSS. Denies pain. BGM. Was agitated/restless in the beginning of the shift.  Large scalp laceration, closed with sutures/staples. Well approximated. . Right eye bruised. Reddened areas under breasts and under skin folds.  Incontinent of urine at times, ambulates to bathroom.  A1, Impulsive, does not call appropriately. L PIV SL. DC pending. Will continue to monitor.

## 2017-10-14 NOTE — PLAN OF CARE
Problem: Patient Care Overview  Goal: Plan of Care/Patient Progress Review  PT: Orders received, chart reviewed.  Discussed with OT.  Per OT, pt agitated with OT during their evaluation and states she is not interested in any sort of therapy at this time. Agree with OT with recommendation for memory care/[facility].  At this time, no acute PT needs identified as performing a full PT evaluation would not aide/change discharge recommendations.  Will complete PT order at this time.
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
## 2017-10-14 NOTE — PROGRESS NOTES
[facility]    Hospitalist Progress Note    Assessment & Plan   Amber Hull is a 72 year old female with PMHx of hypertension, paroxysmal afib and DM with suspected cognitive decline and inability to care for herself who was admitted under observation on 10/12/2017 for evaluation after a fall which resulted in a scalp laceration.     Fall and scalp laceration:    The etiology of the fall is unclear -- patient says she tripped on her dog and fell; the fall was unwitnessed. She sustained a scalp laceration that was sutured in the ED. She had no neurologic deficits. CT scan of her head was negative. This was likely a mechanical fall. Received Tdap vaccine in the ED.   -- admitted to assist with placement as below  -- PT consulted but given observation status and need for cog assessment per OT this assessment was ultimately deferred  -- patient has been ambulating well in hallways      Worsening cognitive function and inability to care for self, suspect Alzheimer's dementia  Patient had been living with Dayaim, a family friend. The patient does not have any relations.  She has had gradual decline in her cognitive function suggestive of worsening dementia.  Dayami thinks that this is the best she has been and says she is worse at times in terms of her memory and cognitive issues.  Despite this the patient continues to drive and is not willing to surrender her driving license and stop driving.  Dayami does not think she will be able to continue to help care for Ms. Hull.   -- psych consulted, noted to have scored poorly on the MMSE  -- has been started on prn Seroquel and prn Zyprexa for agitation  -- likely needs long term care at discharge (memory care vs [facility] vs geripsych) -- SW also following to assist with arranging safe discharge plan      DM II, Uncontrolled:  A1C 12.8 this stay. No known/reported complications from diabetes.  Patient was supposed ot have been taking Lantus 30U daily but  hasn't been taking it for some time now.    on presentation. Given dose of Lantus 15U HS on night of admission.    Recent Labs  Lab 10/14/17  0700 10/14/17  0138 10/13/17  2141 10/13/17  1754 10/13/17  1240 10/13/17  0803 10/13/17  0503  10/12/17  2000   *  --   --   --   --   --   --   --  341*   BGM  --  174* 278* 321* 276* 291* 243*  < >  --    < > = values in this interval not displayed.    -- BG improved with the resumption of Lantus 30U HS  -- cont high dose SSI, though note patient has intermittently been refusing    Hypertension and paroxysmal atrial fibrillation:  Was supposed to have been taking lisinopril 20mg BID and metoprolol 75mg BID bus had not been taking either of these. Had also been prescribed Apixaban 5mg BID which she has not been taking. Note her CHADs2 VASc score is 4, indicating a 4% yearly stroke risk.  -- cont lisinopril 20mg BID and metoprolol 75mg BID  -- monitor BPs and adjust medications further as needed if better control needed  -- holding anticoagulation in the setting of her recent scalp laceration but given her suspected underlying cognitive dysfunction and reported fall it is likely not safe to cont anticoagulation in the future, will consider changing to full-dose aspirin    Suspected stage III CKD:  Cr 1.42 on admission. No IVFs given. Last labs from 11/2016 showed Cr 1.6. Cr otherwise nl as of 4/2016 in Care Everywhere.   -- suspect element of CKD secondary to poorly controlled hypertension  -- Cr 1.38 today  -- monitor renal function, if Cr trends up may need to stop acei    FEN: no IVFs, lytes stable, regular diet  DVT Prophylaxis: PCDs   Code Status: Full Code -- patient was notably confused on admission and unable to participate in conversation on code status thus she was made full code by default. Her friend Dayami does not believe she has a living will.     Disposition: Pending arrangement of placement. Suspect several days still.    Mariaelena Caldwell  White    Interval History   Had some agitation yesterday afternoon and overnight but slept after dose of Seroquel. Seen this morning. Was sleeping when I entered the room. Didn't want to wake up or talk with me. No concerns prn RN.    -Data reviewed today: I reviewed all new labs and imaging results over the last 24 hours. I personally reviewed no images or EKG's today.    Physical Exam   Temp: 98  F (36.7  C) Temp src: Axillary BP: 164/73 Pulse: 72 Heart Rate: 54 Resp: 18 SpO2: 100 % O2 Device: None (Room air)    Vitals:    10/12/17 1835 10/12/17 2333   Weight: 90.7 kg (200 lb) 94.1 kg (207 lb 6.4 oz)     Vital Signs with Ranges  Temp:  [96.2  F (35.7  C)-98  F (36.7  C)] 98  F (36.7  C)  Pulse:  [72-85] 72  Heart Rate:  [54-68] 54  Resp:  [18] 18  BP: (133-164)/(60-88) 164/73  SpO2:  [97 %-100 %] 100 %  I/O last 3 completed shifts:  In: 600 [P.O.:600]  Out: 250 [Urine:250]    Constitutional: Sleeping comfortably, did not wake during my visit, NAD  Respiratory: CTAB, no wheeze/rales/rhonchi  Cardiovascular: HRRR, no MGR  GI: S, NT, ND, +BS  Skin/Integumen: scalp incision healing well -- no oozing or bleeding, +mild R periorbital edema/erythema -- unclear if due to recent fall or how she is sleeping  Other:      Medications        insulin aspart  1-10 Units Subcutaneous TID AC     insulin aspart  1-7 Units Subcutaneous At Bedtime     insulin glargine  30 Units Subcutaneous At Bedtime     lisinopril (PRINIVIL/ZESTRIL) tablet 20 mg  20 mg Oral BID     metoprolol  75 mg Oral BID       Data     Recent Labs  Lab 10/14/17  0700 10/13/17  0725 10/12/17  2000   WBC  --   --  9.4   HGB  --  13.4 14.6   MCV  --   --  86   PLT  --   --  270   INR  --  1.04 0.97   *  --  138   POTASSIUM 3.6  --  3.6   CHLORIDE 111*  --  104   CO2 25  --  24   BUN 23  --  20   CR 1.38*  --  1.42*   ANIONGAP 9  --  10   IDANIA 8.4*  --  8.9   *  --  341*       No results found for this or any previous visit (from the past 24  hour(s)).
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
## 2017-10-14 NOTE — CONSULTS
"[facility] Psychiatric Consult Follow-up Note      TIME SPENT IN PSYCHIATRY CONSULT: 15 MINUTES.     Interim History     The patient's care was discussed, patient seen and chart notes were reviewed. Pt examined on 88. Tolerating medications without side effects. Side effects, risks, and benefits of medications reviewed with patient. Patient is currently negative for suicide ideation, negative for plan or intent, able to contract no self harm and identify barriers to suicide.  Negative for obsessions, compulsions or psychosis.  Pt's condition appears to be declining. Yesterday we were able to have a conversation, and today she is somewhat incoherent and confabulated, and is more disorganized and disoriented. Pt reports she became agitated yesterday because \"you [Dr. Beckford] told me I could leave, and then they [nursing staff] told me that I was not allowed to leave.\" Discussed that Dr. Beckford never said she could leave, and in fact asked her to go voluntarily to assisted living. Discussed a facility another time and Pt verbalized agreement to be referred to a facility. Spoke with the  and discussed Pt's current situation. She has lived with her friend Dayami for 15 years, but Dayami will not allow Pt to return home because Pt has ruined portions of Dayami's home with incontinence, and Dayami and her  can no longer provide Pt with the level of care she needs. Pt does not currently have a home to discharge to, and she does not currently have medical decision making capacity, but she does not have a legal guardian or someone with POA. The  spoke to Dayami and Dayami agreed to consider becoming a guardian, however she is noncommittal. Discussed with  that Pt will need to be referred to some kind of facility, however it is unclear whether Pt has assets to support a stay at a facility. Psychiatry recommends that guardianship be pursued, thought it might take a long " time to process.      Medications     Current Facility-Administered Medications Ordered in Epic   Medication Dose Route Frequency Last Rate Last Dose     insulin aspart (NovoLOG) inj (RAPID ACTING)  1-10 Units Subcutaneous TID AC   2 Units at 10/14/17 1232     insulin aspart (NovoLOG) inj (RAPID ACTING)  1-7 Units Subcutaneous At Bedtime   4 Units at 10/13/17 2148     insulin glargine (LANTUS) injection 30 Units  30 Units Subcutaneous At Bedtime   30 Units at 10/13/17 2146     lisinopril (PRINIVIL/ZESTRIL) tablet 20 mg  20 mg Oral BID   20 mg at 10/14/17 0802     QUEtiapine (SEROquel) tablet 50 mg  50 mg Oral Q2H PRN   50 mg at 10/14/17 0130     OLANZapine zydis (zyPREXA) ODT tab 10 mg  10 mg Oral Q4H PRN   10 mg at 10/13/17 1357     OLANZapine (zyPREXA) injection 10 mg  10 mg Intramuscular Q4H PRN         metoprolol (LOPRESSOR) tablet 75 mg  75 mg Oral BID   75 mg at 10/14/17 1232     naloxone (NARCAN) injection 0.1-0.4 mg  0.1-0.4 mg Intravenous Q2 Min PRN         acetaminophen (TYLENOL) tablet 650 mg  650 mg Oral Q4H PRN         acetaminophen (TYLENOL) Suppository 650 mg  650 mg Rectal Q4H PRN         senna-docusate (SENOKOT-S;PERICOLACE) 8.6-50 MG per tablet 1-2 tablet  1-2 tablet Oral BID PRN         bisacodyl (DULCOLAX) Suppository 10 mg  10 mg Rectal Daily PRN         ondansetron (ZOFRAN-ODT) ODT tab 4 mg  4 mg Oral Q6H PRN        Or     ondansetron (ZOFRAN) injection 4 mg  4 mg Intravenous Q6H PRN         traMADol (ULTRAM) half-tab 25 mg  25 mg Oral Q6H PRN         glucose 40 % gel 15-30 g  15-30 g Oral Q15 Min PRN        Or     dextrose 50 % injection 25-50 mL  25-50 mL Intravenous Q15 Min PRN        Or     glucagon injection 1 mg  1 mg Subcutaneous Q15 Min PRN         labetalol (NORMODYNE/TRANDATE) injection 10 mg  10 mg Intravenous Q2H PRN   10 mg at 10/13/17 0208     hydrALAZINE (APRESOLINE) injection 5 mg  5 mg Intravenous Q2H PRN         No current Epic-ordered outpatient prescriptions on file.          "Allergies      Allergies   Allergen Reactions     No Known Allergies         Medical Review of Systems     /79 (BP Location: Right arm)  Pulse 72  Temp 95.8  F (35.4  C) (Oral)  Resp 18  Ht 1.651 m (5' 5\")  Wt 94.1 kg (207 lb 6.4 oz)  SpO2 98%  BMI 34.51 kg/m2  Body mass index is 34.51 kg/(m^2).  A 10-point review of systems was performed by Ubaldo Beckford MD and is negative, no new findings.      Psychiatric Examination     Appearance Lying in bed, dressed in gown. Appears stated age.   Attitude Somewhat cooperative   Orientation Oriented to person only   Eye Contact Poor   Speech Regular rate, rhythm, volume and tone   Language Normal   Psychomotor Behavior Supine   Mood Irritable   Affect Limited range   Thought Process Disorganized   Associations Some loose associations   Thought Content Patient is currently negative for suicide ideation, negative for plan or intent, able to contract no self harm and identify barriers to suicide.  Negative for obsessions, compulsions or psychosis.      Fund of Knowledge Limited   Insight Grossly impaired   Judgement Impaired   Attention Span & Concentration Impaired   Recent & Remote Memory Severely impaired   Gait NA   Muscle Tone Intact       Labs     Labs reviewed.  Recent Results (from the past 24 hour(s))   Glucose by meter    Collection Time: 10/13/17  5:54 PM   Result Value Ref Range    Glucose 321 (H) 70 - 99 mg/dL   Glucose by meter    Collection Time: 10/13/17  9:41 PM   Result Value Ref Range    Glucose 278 (H) 70 - 99 mg/dL   Glucose by meter    Collection Time: 10/14/17  1:38 AM   Result Value Ref Range    Glucose 174 (H) 70 - 99 mg/dL   Basic metabolic panel    Collection Time: 10/14/17  7:00 AM   Result Value Ref Range    Sodium 145 (H) 133 - 144 mmol/L    Potassium 3.6 3.4 - 5.3 mmol/L    Chloride 111 (H) 94 - 109 mmol/L    Carbon Dioxide 25 20 - 32 mmol/L    Anion Gap 9 3 - 14 mmol/L    Glucose 191 (H) 70 - 99 mg/dL    Urea Nitrogen 23 7 - 30 " mg/dL    Creatinine 1.38 (H) 0.52 - 1.04 mg/dL    GFR Estimate 38 (L) >60 mL/min/1.7m2    GFR Estimate If Black 46 (L) >60 mL/min/1.7m2    Calcium 8.4 (L) 8.5 - 10.1 mg/dL   Glucose by meter    Collection Time: 10/14/17 12:23 PM   Result Value Ref Range    Glucose 167 (H) 70 - 99 mg/dL        Impression     The patient Amber Hull is a 72-year-old woman with a medical history significant for diabetes mellitus, paroxysmal atrial fibrillation, hypertension, and possible Alzheimer's who presented to the [facility] ED due to a scalp laceration. She has significant memory impairment. She scored poorly on the Mini Mental Status Exam, indicating significant memory impairment. According to EMS and a friend named Dayami with whom Pt lives, Pt's house is in disarray and there is feces everywhere. Her memory has been deteriorating over time according to Dayami; in November 2016 she presented to the ED after being found wandering around outside in the cold without shoes. Pt is worse than she was yesterday, she is more disorganized, incoherent, and disoriented. Pt will need a legal guardian and referral to a long term care facility eventually.      Diagnoses     1. Neurocognitive deficit, consistent with Alzheimer's     Plan     1. Explained Side effects, benefits and complications of medications to the patient.   2. Medication changes: none  3. Discussed treatment plan with patient and team.  4. Pursue guardianship      TIME SPENT IN PSYCHIATRY CONSULT: 15 MINUTES.    Attestation:   Patient has been seen and evaluated by me, Ubaldo Beckford MD.    Patient ID:  Name: Amber Hull    MRN: [id number]  Admission: 10/12/2017    YOB: 1945

## 2017-10-14 NOTE — PROVIDER NOTIFICATION
MD Notification    Notified Person:  MD    Notified Persons Name: Dr. Beckford    Notification Date/Time: 10/14 5:25    Notification Interaction:  Talked with Physician    Purpose of Notification: Psych hold suggested from Dr. Ordonez    Orders Received: put pt on hold    Comments:

## 2017-10-14 NOTE — PROGRESS NOTES
"SWS  D:  SWS following pt for coordination of appropriate discharge plan.  Pt's friend Dayami and her mother came to visit pt today.  I:  SW spoke briefly with Dayami.  Dayami informed SW that pt handles her own finances.  Dayami is not listed on any of pt's accounts.  Dayami is going to go home after lunch with her mother to see if she can find any POA documents pt may have.  Dayami and her  both work and Dayami feels pt's needs can no longer be met at her home.  Dayami is also very busy with caring for her mother who resides at [facility] in [place].  Per Dayami pt has had an [facility] PCP in the past, however, he no longer works with pt as pt was not willing to follow his recommendations.  Pt is like a \"2nd mother\" to Dayami.  Pt used to help care for Dayami's mother and had access to Dayami's mother's accounts until Dayami learned pt was taking money from her mother.  When SW asked if Dayami may ever consider guardianship/conservatorship of pt, Dayami say she may, however, would want more details as to what the role entails before making a decision.  Dayami is aware pt can be very difficult to work with.  Dayami understands pt may need to have a guardian.    P:  SWS will continue to follow pt for coordination of d/c plan.  Psych is ordered to see pt again.    Update: Around 1530, SW spoke to friend Dayami again by phone.  Dayami has still not located any POA papers for pt.  After consulting with another [facility] SW, DAMIAN suggested Dayami contact the [facility] on Monday at [phone] to learn more about possibly becoming a guardian for pt or other options available and what this potential role may entail as psych informed SW that he is recommending pt needs a guardian.  Dayami agrees to do this.  Dayami again confirmed with SW that pt cannot return to her home as pt has ruined portions of Dayami's home due to incontinence etc...and 24/7 supervision is recommended for pt and Dayami and " her  cannot provide this for pt.

## 2017-10-14 NOTE — PROVIDER NOTIFICATION
MD Notification    Notified Person:  MD    Notified Persons Name: Dr. Ordonez    Notification Date/Time: 10/14 5:18    Notification Interaction:  Talked with Physician    Purpose of Notification: Pt threatening to leave facility. Wondering about hold    Orders Received: try Seroquel. If this doesn't work psych consult for hold    Comments: Pt refusing all cares including Seroquel. Talked to Dr. Beckford who suggest hold.

## 2017-10-14 NOTE — PLAN OF CARE
Problem: Patient Care Overview  Goal: Plan of Care/Patient Progress Review  Outcome: No Change  Patient alert to self. VSS. Denies pain. Became agitated/restless overnight and set bed alarm off multiple times during shift. PRN Seroquel given and was effective. Patient now sleeping.  Large scalp laceration, closed with sutures/staples. Well approximated. . Right eye bruised. Reddened areas under breasts and under skin folds.  Incontinent of urine at times, ambulates to bathroom.  A1, Impulsive, does not call appropriately. L PIV SL. DC pending. Will continue to monitor.

## 2017-10-15 LAB
GLUCOSE BLDC GLUCOMTR-MCNC: 106 MG/DL (ref 70–99)
GLUCOSE BLDC GLUCOMTR-MCNC: 185 MG/DL (ref 70–99)
GLUCOSE BLDC GLUCOMTR-MCNC: 204 MG/DL (ref 70–99)
GLUCOSE BLDC GLUCOMTR-MCNC: 98 MG/DL (ref 70–99)

## 2017-10-15 PROCEDURE — 25000132 ZZH RX MED GY IP 250 OP 250 PS 637: Mod: GY | Performed by: INTERNAL MEDICINE

## 2017-10-15 PROCEDURE — 00000146 ZZHCL STATISTIC GLUCOSE BY METER IP

## 2017-10-15 PROCEDURE — 25000132 ZZH RX MED GY IP 250 OP 250 PS 637: Performed by: HOSPITALIST

## 2017-10-15 PROCEDURE — 96372 THER/PROPH/DIAG INJ SC/IM: CPT

## 2017-10-15 PROCEDURE — 25000131 ZZH RX MED GY IP 250 OP 636 PS 637: Performed by: INTERNAL MEDICINE

## 2017-10-15 PROCEDURE — A9270 NON-COVERED ITEM OR SERVICE: HCPCS | Mod: GY | Performed by: INTERNAL MEDICINE

## 2017-10-15 PROCEDURE — A9270 NON-COVERED ITEM OR SERVICE: HCPCS | Mod: GY | Performed by: HOSPITALIST

## 2017-10-15 PROCEDURE — 40000916 ZZH STATISTIC SITTER, NIGHT HOURS

## 2017-10-15 PROCEDURE — 99225 ZZC SUBSEQUENT OBSERVATION CARE,LEVEL II: CPT | Performed by: INTERNAL MEDICINE

## 2017-10-15 PROCEDURE — 99232 SBSQ HOSP IP/OBS MODERATE 35: CPT | Performed by: PSYCHIATRY & NEUROLOGY

## 2017-10-15 PROCEDURE — 40000915 ZZH STATISTIC SITTER, EVENING HOURS

## 2017-10-15 PROCEDURE — G0378 HOSPITAL OBSERVATION PER HR: HCPCS

## 2017-10-15 RX ADMIN — MICONAZOLE NITRATE: 2 POWDER TOPICAL at 20:26

## 2017-10-15 RX ADMIN — METOPROLOL TARTRATE 75 MG: 25 TABLET ORAL at 20:26

## 2017-10-15 RX ADMIN — LISINOPRIL 20 MG: 20 TABLET ORAL at 20:26

## 2017-10-15 RX ADMIN — INSULIN GLARGINE 35 UNITS: 100 INJECTION, SOLUTION SUBCUTANEOUS at 22:53

## 2017-10-15 NOTE — PROGRESS NOTES
"LEXI  D:  Pt was threatening to leave the hospital yesterday.  Pt was then placed on 72 hour hold.  Psych has decided to petition for commitment and examiner\"s statement was completed by them.  Statement placed on pt chart.  Pt has cognitive impairment noted to be severe and can no longer care for herself.  Friend Dayami is not willing to have pt return to her home as she cannot meet pt's needs and pt has destroyed some of Dayami and her family's property due to her incontinence and Dayami can no longer tolerate this at her home.  Dayami provided SW with living will and health care POA form she found at home  dated/notarized 1994 on pt indicating Ludmila Borges to be pt's proxy and HC POA.  Secondary contact is Tasia Olivares who is friend Dayami's mother, who, per Dayami has dementia and is in a nursing home.    I:  DAMIAN and Dayami met with pt to inform her she cannot and will not be able to return to Dayami's home.  DAMIAN explained the hospital SW will work with the [facility] to find pt a safe place to go.  Dayami will remain involved and will visit pt when she can and is concerned about pt's well being.  Pt was lying in bed throughout entire conversation saying \"fuck\" and \"shut up\".    Pt offered no further questions or concerns to DAMIAN.   Dayami informed DAMIAN she located some of pt's money in her home and asked if she could use these funds to help pay for costs of throwing away of her property that pt has soiled and is no longer safe for use.  DAMIAN informed Dayami she needs to consider contacting an elder law  to address these concerns.  Dayami will be contacting an organization tomorrow about what the role of guardianship may entail as she is still considering this role for pt.  After she learns more information and talks to her family further about it, Dayami will inform unit SW of her decision.    P:  SWS will follow pt thru petition to commit process and court determination as well as for safe discharge planning.  LEXI will " attempt to contact listed HC POA, Ms. Ludmila Borges.      Update:  SW spoke to Ludmila Borges by phone.  Per Ludmila, she and pt have been out of contact for over 5 years.  She understands she has been assigned health care POA status by pt and is willing to assume this responsibility.  DAMIAN explained to Ludmila pt's current condition and circumstances including commitment process being in motion and friend Dayami considering guardianship/conservatorship role for pt.  DAMIAN changed contact sheet to show Ludmila as main contact and decision maker for pt.  HC POA form placed on chart.  Pt remains on 72 hour hold.  Exhibit A will need to be completed and submitted to county/court.

## 2017-10-15 NOTE — PLAN OF CARE
Problem: Patient Care Overview  Goal: Plan of Care/Patient Progress Review  Outcome: No Change  Patient alert to self. VSS. Denies pain. BGM. Pt refused meds at points in shift, later was able to take most. Large scalp laceration, closed with sutures/staples. Well approximated. Right eye bruised. Reddened areas under breasts and under skin folds.  Incontinent of urine at times, ambulates to bathroom.  A1, Impulsive, does not call appropriately. Given Seroquel x1. On 72 hr hold per MD and psych. L PIV SL. Sleeping comfortable on nights. DC pending. Will continue to monitor.

## 2017-10-15 NOTE — PLAN OF CARE
Problem: Patient Care Overview  Goal: Plan of Care/Patient Progress Review  Outcome: No Change  Patient alert to self. VSS. Denies pain. BGM. Pt refused meds at points in shift, later was able to take most. Large scalp laceration, closed with sutures/staples. Well approximated. Right eye bruised. Reddened areas under breasts and under skin folds.  Incontinent of urine at times, ambulates to bathroom.  A1, Impulsive, does not call appropriately. Given Seroquel x1. On 72 hr hold per MD and psych. L PIV SL. DC pending. Will continue to monitor.

## 2017-10-15 NOTE — PLAN OF CARE
Problem: Patient Care Overview  Goal: Plan of Care/Patient Progress Review  Outcome: No Change  Patient alert to self. Denies pain. BGM as pt allows.  Pt is refusing assessment from RN, refusing medications and other VS and cares.  BG at 1200 is 185. Large scalp laceration, closed with sutures/staples. Well approximated. Right eye bruised. Reddened areas under breasts and under skin folds.  Incontinent of urine at times, ambulates to bathroom.  A1, Impulsive, does not call appropriately. On 72 hr hold per MD and psych. L GALA SL.  DC pending- DAMIAN working on dc process. Will continue to monitor.

## 2017-10-15 NOTE — PROGRESS NOTES
[facility]    Hospitalist Progress Note    Assessment & Plan   Amber Hull is a 72 year old female with PMHx of hypertension, paroxysmal afib and DM with suspected cognitive decline and inability to care for herself who was admitted under observation on 10/12/2017 for evaluation after a fall which resulted in a scalp laceration.     Fall and scalp laceration:    The etiology of the fall is unclear -- patient says she tripped on her dog and fell; the fall was unwitnessed. She sustained a scalp laceration that was sutured in the ED. She had no neurologic deficits. CT scan of her head was negative. This was likely a mechanical fall. Received Tdap vaccine in the ED.   -- admitted to assist with placement as below  -- PT consulted but given observation status and need for cog assessment per OT this assessment was ultimately deferred  -- patient has been ambulating well in hallways      Worsening cognitive function and inability to care for self, suspect Alzheimer's dementia  Patient had been living with Dayami, a family friend. The patient does not have any relations.  She has had gradual decline in her cognitive function suggestive of worsening dementia.  Dayami thinks that this is the best she has been and says she is worse at times in terms of her memory and cognitive issues.  Despite this the patient continues to drive and is not willing to surrender her driving license and stop driving.  Dayami does not think she will be able to continue to help care for Ms. Hull.   -- psych consulted, noted to have scored poorly on the MMSE  -- has been started on prn Seroquel and prn Zyprexa for agitation  -- placed on 72h hold on 10/14 PM  -- psych recommending guardianship be pursued  -- likely needs long term care at discharge (memory care vs retirement vs geripsych) -- SW following to assist with arranging safe discharge plan      DM II, Uncontrolled:  A1C 12.8 this stay. No known/reported complications from  diabetes.  Patient was supposed ot have been taking Lantus 30U daily but hasn't been taking it for some time now.    on presentation. Given dose of Lantus 15U HS on night of admission, have since been titrating up dose.    Recent Labs  Lab 10/15/17  0158 10/14/17  2204 10/14/17  1823 10/14/17  1223 10/14/17  0700 10/14/17  0138 10/13/17  2141  10/12/17  2000   GLC  --   --   --   --  191*  --   --   --  341*   * 254* 238* 167*  --  174* 278*  < >  --    < > = values in this interval not displayed.    -- will increase Lantus to 35U HS  -- cont high dose SSI, though note patient has intermittently been refusing her insulin    Hypertension and paroxysmal atrial fibrillation:  Was supposed to have been taking lisinopril 20mg BID and metoprolol 75mg BID bus had not been taking either of these. Had also been prescribed Apixaban 5mg BID which she has not been taking. Note her CHADs2 VASc score is 4, indicating a 4% yearly stroke risk.  -- cont lisinopril 20mg BID and metoprolol 75mg BID  -- monitor BPs and adjust medications further as needed if better control needed  -- given her suspected underlying cognitive dysfunction and reported fall it is likely not safe to cont anticoagulation in the future, will consider changing to full-dose aspirin    Suspected stage III CKD:  Cr 1.42 on admission. No IVFs given. Last labs from 11/2016 showed Cr 1.6. Cr otherwise nl as of 4/2016 in Care Everywhere.   -- suspect element of CKD secondary to poorly controlled hypertension  -- Cr 1.38 per last check on 10/14  -- monitor renal function periodically, if Cr trends up may need to stop acei    FEN: no IVFs, lytes stable, regular diet  DVT Prophylaxis: PCDs   Code Status: Full Code -- patient was notably confused on admission and unable to participate in conversation on code status thus she was made full code by default. Her friend Dayami does not believe she has a living will.     Disposition: Pending plans to pursue  guardianship and arrangement of placement. Suspect several days still.    Mariaelenatimmy Rodriguezth José Luis    Interval History   Patient threatened to leave yesterday, refused medications (Seroquel) and ultimately had to be placed on 72h hold. Agreeable to dose of Seroquel overnight and was sleeping quietly this morning. Did not wake during my visit.     -Data reviewed today: I reviewed all new labs and imaging results over the last 24 hours. I personally reviewed no images or EKG's today.    Physical Exam   Temp: 96.3  F (35.7  C) Temp src: Axillary BP: 151/67 Pulse: 69 Heart Rate: 59 Resp: 16 SpO2: 96 % O2 Device: None (Room air)    Vitals:    10/12/17 1835 10/12/17 2333   Weight: 90.7 kg (200 lb) 94.1 kg (207 lb 6.4 oz)     Vital Signs with Ranges  Temp:  [95.8  F (35.4  C)-96.3  F (35.7  C)] 96.3  F (35.7  C)  Pulse:  [69] 69  Heart Rate:  [54-62] 59  Resp:  [16-18] 16  BP: (127-181)/(52-79) 151/67  SpO2:  [96 %-98 %] 96 %  I/O last 3 completed shifts:  In: 640 [P.O.:640]  Out: -     Constitutional: Sleeping comfortably, did not wake during my visit, NAD  Respiratory: CTAB, no wheeze/rales/rhonchi  Cardiovascular: HRRR, no MGR  GI: S, NT, ND, +BS  Skin/Integumen: scalp incision healing well -- no oozing or bleeding, +R periorbital ecchymosis  Other:      Medications        insulin aspart  1-10 Units Subcutaneous TID AC     insulin aspart  1-7 Units Subcutaneous At Bedtime     insulin glargine  30 Units Subcutaneous At Bedtime     lisinopril (PRINIVIL/ZESTRIL) tablet 20 mg  20 mg Oral BID     metoprolol  75 mg Oral BID       Data     Recent Labs  Lab 10/14/17  0700 10/13/17  0725 10/12/17  2000   WBC  --   --  9.4   HGB  --  13.4 14.6   MCV  --   --  86   PLT  --   --  270   INR  --  1.04 0.97   *  --  138   POTASSIUM 3.6  --  3.6   CHLORIDE 111*  --  104   CO2 25  --  24   BUN 23  --  20   CR 1.38*  --  1.42*   ANIONGAP 9  --  10   IDANIA 8.4*  --  8.9   *  --  341*       No results found for this or any  previous visit (from the past 24 hour(s)).

## 2017-10-15 NOTE — CONSULTS
[facility] Psychiatric Consult Follow-up Note      TIME SPENT IN PSYCHIATRY CONSULT: 15 MINUTES.     Interim History     The patient's care was discussed, patient seen and chart notes were reviewed. Pt examined on 88. Tolerating medications without side effects. Side effects, risks, and benefits of medications reviewed with patient. Patient is currently negative for suicide ideation, negative for plan or intent, able to contract no self harm and identify barriers to suicide.  Negative for obsessions, compulsions or psychosis.  Spoke to the  today. Determined plan to file for commitment, as Pt has no ability to care for herself and has advanced cognitive impairment. Completed commitment paperwork. She was placed on 72-hour hold.      Medications     Current Facility-Administered Medications Ordered in Epic   Medication Dose Route Frequency Last Rate Last Dose     insulin glargine (LANTUS) injection 35 Units  35 Units Subcutaneous At Bedtime         insulin aspart (NovoLOG) inj (RAPID ACTING)  1-10 Units Subcutaneous TID AC   2 Units at 10/14/17 1232     insulin aspart (NovoLOG) inj (RAPID ACTING)  1-7 Units Subcutaneous At Bedtime   3 Units at 10/14/17 2202     lisinopril (PRINIVIL/ZESTRIL) tablet 20 mg  20 mg Oral BID   20 mg at 10/14/17 1952     QUEtiapine (SEROquel) tablet 50 mg  50 mg Oral Q2H PRN   50 mg at 10/14/17 1952     OLANZapine zydis (zyPREXA) ODT tab 10 mg  10 mg Oral Q4H PRN   10 mg at 10/13/17 1357     OLANZapine (zyPREXA) injection 10 mg  10 mg Intramuscular Q4H PRN         metoprolol (LOPRESSOR) tablet 75 mg  75 mg Oral BID   75 mg at 10/14/17 1952     naloxone (NARCAN) injection 0.1-0.4 mg  0.1-0.4 mg Intravenous Q2 Min PRN         acetaminophen (TYLENOL) tablet 650 mg  650 mg Oral Q4H PRN         acetaminophen (TYLENOL) Suppository 650 mg  650 mg Rectal Q4H PRN         senna-docusate (SENOKOT-S;PERICOLACE) 8.6-50 MG per tablet 1-2 tablet  1-2 tablet Oral BID PRN          "bisacodyl (DULCOLAX) Suppository 10 mg  10 mg Rectal Daily PRN         ondansetron (ZOFRAN-ODT) ODT tab 4 mg  4 mg Oral Q6H PRN        Or     ondansetron (ZOFRAN) injection 4 mg  4 mg Intravenous Q6H PRN         traMADol (ULTRAM) half-tab 25 mg  25 mg Oral Q6H PRN         glucose 40 % gel 15-30 g  15-30 g Oral Q15 Min PRN        Or     dextrose 50 % injection 25-50 mL  25-50 mL Intravenous Q15 Min PRN        Or     glucagon injection 1 mg  1 mg Subcutaneous Q15 Min PRN         labetalol (NORMODYNE/TRANDATE) injection 10 mg  10 mg Intravenous Q2H PRN   10 mg at 10/13/17 0208     hydrALAZINE (APRESOLINE) injection 5 mg  5 mg Intravenous Q2H PRN         No current Epic-ordered outpatient prescriptions on file.         Allergies      Allergies   Allergen Reactions     No Known Allergies         Medical Review of Systems     /67 (BP Location: Right arm)  Pulse 69  Temp 96.3  F (35.7  C) (Axillary)  Resp 16  Ht 1.651 m (5' 5\")  Wt 94.1 kg (207 lb 6.4 oz)  SpO2 96%  BMI 34.51 kg/m2  Body mass index is 34.51 kg/(m^2).  A 10-point review of systems was performed by Ubaldo Beckford MD and is negative, no new findings.      Psychiatric Examination     Appearance Lying in bed, dressed in gown. Appears stated age.   Attitude Somewhat cooperative   Orientation Oriented to person only   Eye Contact Poor   Speech Regular rate, rhythm, volume and tone   Language Normal   Psychomotor Behavior Supine   Mood Irritable   Affect Limited range   Thought Process Disorganized   Associations Some loose associations   Thought Content Patient is currently negative for suicide ideation, negative for plan or intent, able to contract no self harm and identify barriers to suicide. Positive for obsessions, compulsions or psychosis.      Fund of Knowledge Limited   Insight Grossly impaired   Judgement Impaired   Attention Span & Concentration Impaired   Recent & Remote Memory Severely impaired   Gait NA   Muscle Tone Intact       " Labs     Labs reviewed.  Recent Results (from the past 24 hour(s))   Glucose by meter    Collection Time: 10/14/17 12:23 PM   Result Value Ref Range    Glucose 167 (H) 70 - 99 mg/dL   Glucose by meter    Collection Time: 10/14/17  6:23 PM   Result Value Ref Range    Glucose 238 (H) 70 - 99 mg/dL   Glucose by meter    Collection Time: 10/14/17 10:04 PM   Result Value Ref Range    Glucose 254 (H) 70 - 99 mg/dL   Glucose by meter    Collection Time: 10/15/17  1:58 AM   Result Value Ref Range    Glucose 204 (H) 70 - 99 mg/dL        Impression     The patient Amber Hull is a 72-year-old woman with a medical history significant for diabetes mellitus, paroxysmal atrial fibrillation, hypertension, and possible Alzheimer's who presented to the [facility] ED due to a scalp laceration. She has significant memory impairment. She scored poorly on the Mini Mental Status Exam, indicating significant memory impairment. According to EMS and a friend named Dayami with whom Pt lives, Pt's house is in disarray and there is feces everywhere. Her memory has been deteriorating over time according to Dayami; in November 2016 she presented to the ED after being found wandering around outside in the cold without shoes. Pt is more disorganized, incoherent, and disoriented. Guardianship is being pursued. Completed commitment paperwork today. Pt was placed on 72-hour hold.       Diagnoses     1. Neurocognitive deficit, consistent with Alzheimer's     Plan     1. Explained Side effects, benefits and complications of medications to the patient.   2. Medication changes: none  3. Discussed treatment plan with patient and team.  4. Pursue guardianship  5. Commitment paperwork completed      TIME SPENT IN PSYCHIATRY CONSULT: 15 MINUTES.    Attestation:   Patient has been seen and evaluated by me, Ubaldo Beckford MD.    Patient ID:  Name: Amber Hull    MRN: [id number]  Admission: 10/12/2017    YOB: 1945

## 2017-10-16 LAB
ANION GAP SERPL CALCULATED.3IONS-SCNC: 7 MMOL/L (ref 3–14)
BUN SERPL-MCNC: 23 MG/DL (ref 7–30)
CALCIUM SERPL-MCNC: 8.6 MG/DL (ref 8.5–10.1)
CHLORIDE SERPL-SCNC: 110 MMOL/L (ref 94–109)
CO2 SERPL-SCNC: 26 MMOL/L (ref 20–32)
CREAT SERPL-MCNC: 1.38 MG/DL (ref 0.52–1.04)
GFR SERPL CREATININE-BSD FRML MDRD: 38 ML/MIN/1.7M2
GLUCOSE BLDC GLUCOMTR-MCNC: 129 MG/DL (ref 70–99)
GLUCOSE BLDC GLUCOMTR-MCNC: 185 MG/DL (ref 70–99)
GLUCOSE BLDC GLUCOMTR-MCNC: 193 MG/DL (ref 70–99)
GLUCOSE BLDC GLUCOMTR-MCNC: 96 MG/DL (ref 70–99)
GLUCOSE BLDC GLUCOMTR-MCNC: 97 MG/DL (ref 70–99)
GLUCOSE SERPL-MCNC: 93 MG/DL (ref 70–99)
POTASSIUM SERPL-SCNC: 3.5 MMOL/L (ref 3.4–5.3)
SODIUM SERPL-SCNC: 143 MMOL/L (ref 133–144)

## 2017-10-16 PROCEDURE — G0378 HOSPITAL OBSERVATION PER HR: HCPCS

## 2017-10-16 PROCEDURE — 25000132 ZZH RX MED GY IP 250 OP 250 PS 637: Performed by: HOSPITALIST

## 2017-10-16 PROCEDURE — 00000146 ZZHCL STATISTIC GLUCOSE BY METER IP

## 2017-10-16 PROCEDURE — 96372 THER/PROPH/DIAG INJ SC/IM: CPT

## 2017-10-16 PROCEDURE — 25000132 ZZH RX MED GY IP 250 OP 250 PS 637: Mod: GY | Performed by: INTERNAL MEDICINE

## 2017-10-16 PROCEDURE — A9270 NON-COVERED ITEM OR SERVICE: HCPCS | Mod: GY | Performed by: INTERNAL MEDICINE

## 2017-10-16 PROCEDURE — 36415 COLL VENOUS BLD VENIPUNCTURE: CPT | Performed by: INTERNAL MEDICINE

## 2017-10-16 PROCEDURE — 99226 ZZC SUBSEQUENT OBSERVATION CARE,LEVEL III: CPT | Performed by: INTERNAL MEDICINE

## 2017-10-16 PROCEDURE — 80048 BASIC METABOLIC PNL TOTAL CA: CPT | Performed by: INTERNAL MEDICINE

## 2017-10-16 PROCEDURE — 25000132 ZZH RX MED GY IP 250 OP 250 PS 637: Performed by: PSYCHIATRY & NEUROLOGY

## 2017-10-16 PROCEDURE — A9270 NON-COVERED ITEM OR SERVICE: HCPCS | Mod: GY | Performed by: HOSPITALIST

## 2017-10-16 PROCEDURE — A9270 NON-COVERED ITEM OR SERVICE: HCPCS | Mod: GY | Performed by: PSYCHIATRY & NEUROLOGY

## 2017-10-16 PROCEDURE — 25000131 ZZH RX MED GY IP 250 OP 636 PS 637: Performed by: INTERNAL MEDICINE

## 2017-10-16 RX ORDER — AMLODIPINE BESYLATE 5 MG/1
5 TABLET ORAL DAILY
Status: DISCONTINUED | OUTPATIENT
Start: 2017-10-16 | End: 2017-10-17

## 2017-10-16 RX ORDER — CLONIDINE HYDROCHLORIDE 0.1 MG/1
0.1 TABLET ORAL EVERY 4 HOURS PRN
Status: DISCONTINUED | OUTPATIENT
Start: 2017-10-16 | End: 2017-11-01 | Stop reason: HOSPADM

## 2017-10-16 RX ADMIN — LISINOPRIL 20 MG: 20 TABLET ORAL at 20:05

## 2017-10-16 RX ADMIN — METOPROLOL TARTRATE 75 MG: 25 TABLET ORAL at 08:02

## 2017-10-16 RX ADMIN — LISINOPRIL 20 MG: 20 TABLET ORAL at 08:02

## 2017-10-16 RX ADMIN — AMLODIPINE BESYLATE 5 MG: 5 TABLET ORAL at 13:55

## 2017-10-16 RX ADMIN — QUETIAPINE FUMARATE 50 MG: 25 TABLET, FILM COATED ORAL at 16:26

## 2017-10-16 RX ADMIN — METOPROLOL TARTRATE 75 MG: 25 TABLET ORAL at 20:05

## 2017-10-16 RX ADMIN — INSULIN GLARGINE 35 UNITS: 100 INJECTION, SOLUTION SUBCUTANEOUS at 22:45

## 2017-10-16 NOTE — PLAN OF CARE
Problem: Confusion, Chronic (Adult)  Goal: Identify Related Risk Factors and Signs and Symptoms  Related risk factors and signs and symptoms are identified upon initiation of Human [employer] Clinical Practice Guideline (CPG).   Outcome: No Change  Patient alert to self. Denies pain. Hypertensive, did take pills with water.  Sleeping most of shift.  Occasionally verbally abusive.  Large scalp laceration, closed with sutures/staples, and well approximated.  Bilateral eyes swollen and bruised. Reddened areas under breasts and cristy area, miconazole powder applied.  Incontinent of urine at times, ambulates to bathroom.  Sitter at bedside.  A1, Impulsive, does not call appropriately. On 72 hr hold per MD and psych. L PIV SL.  DC pending- SW working on dc process. Nursing will continue to monitor.

## 2017-10-16 NOTE — PLAN OF CARE
Problem: Patient Care Overview  Goal: Plan of Care/Patient Progress Review  A&Ox1, confused, dissociative speech. VS hypertensive at start of shift then lowered in afternoon. Pt denied pain, was mildly cooperative. Pt has staples midline on head, NATE. Bruises around eyes. Sitter, up ao1 and walker. Voiding, incont at times. PIV SL. On 72hr hold starting today.

## 2017-10-16 NOTE — PROGRESS NOTES
[facility]    Hospitalist Progress Note    Assessment & Plan   Amber Hull is a 72 year old female with PMHx of hypertension, paroxysmal afib and DM with suspected cognitive decline and inability to care for herself who was admitted under observation on 10/12/2017 for evaluation after a fall which resulted in a scalp laceration.     Fall and scalp laceration:    The etiology of the fall is unclear -- patient says she tripped on her dog and fell; the fall was unwitnessed. She sustained a scalp laceration that was sutured in the ED. She had no neurologic deficits. CT scan of her head was negative. This was likely a mechanical fall. Received Tdap vaccine in the ED.   -- admitted to assist with placement as below  -- PT consulted but given observation status and need for cog assessment per OT this assessment was ultimately deferred  -- no issues with ambulation observed per staff this stay  -- bilateral periorbital ecchymosis also likely dt fall      Worsening cognitive function and inability to care for self, suspect Alzheimer's dementia  Patient had been living with Dayami, a family friend. The patient does not have any relations.  She has had gradual decline in her cognitive function suggestive of worsening dementia.  Dayami thinks that this is the best she has been and says she is worse at times in terms of her memory and cognitive issues.  Despite this the patient continues to drive and is not willing to surrender her driving license and stop driving.  Dayami does not think she will be able to continue to help care for Ms. Hull.   -- psych consulted, noted to have scored poorly on the MMSE  -- has been started on prn Seroquel and prn Zyprexa for agitation -- minimal need in past 24h  -- placed on 72h hold on 10/14 PM (won't take effect until 10/16 at 0001)  -- psych recommending guardianship be pursued  -- likely needs long term care at discharge (memory care vs [facility] vs geripsych) -- SW  following to assist with arranging safe discharge plan      DM II, Uncontrolled:  A1C 12.8 this stay. No known/reported complications from diabetes.  Patient was supposed ot have been taking Lantus 30U daily but hasn't been taking it for some time now.    on presentation. Given dose of Lantus 15U HS on night of admission, titrating dose this stay    Recent Labs  Lab 10/16/17  0800 10/16/17  0725 10/16/17  0153 10/15/17  2225 10/15/17  1856 10/15/17  1146 10/15/17  0158  10/14/17  0700  10/12/17  2000   GLC  --  93  --   --   --   --   --   --  191*  --  341*   BGM 97  --  96 106* 98 185* 204*  < >  --   < >  --    < > = values in this interval not displayed.    -- BG improved today, cont Lantus 35U HS  -- decreased SSI from high dose to med dose (has been intermittently refusing insulin this stay)    Hypertension and paroxysmal atrial fibrillation:  Was supposed to have been taking lisinopril 20mg BID and metoprolol 75mg BID bus had not been taking either of these. Had also been prescribed Apixaban 5mg BID which she has not been taking. Note her CHADs2 VASc score is 4, indicating a 4% yearly stroke risk.  -- cont lisinopril 20mg BID and metoprolol 75mg BID  -- BPs remain variable but trending 140-160 systolic, amlodipine 5mg daily added 10/16  -- monitor BPs and adjust medications further as needed if better control needed  -- given her suspected underlying cognitive dysfunction and reported fall it is likely not safe to cont anticoagulation in the future, will consider changing to full-dose aspirin    Suspected stage III CKD:  Cr 1.42 on admission. No IVFs given. Last labs from 11/2016 showed Cr 1.6. Cr otherwise nl as of 4/2016 in Care Everywhere.   -- suspect element of CKD secondary to poorly controlled hypertension  -- Cr stable at 1.38 per checks on 10/14 and 10/16  -- monitor renal function periodically, if Cr trends up may need to stop acei    FEN: no IVFs, lytes stable, regular diet  DVT Prophylaxis:  "PCDs   Code Status: Full Code -- patient was notably confused on admission and unable to participate in conversation on code status thus she was made full code by default. Her friend Dayami does not believe she has a living will.     Disposition: Pending plans to pursue guardianship and arrangement of placement. Suspect several days still.    Appreciate help of psych, SW and CC this stay.    Mariaelena Ordonez    Interval History   Seen this morning. Calm. No complaints. Asking if she's \"going to have to be here all day?\". Eating/drinking okay.    -Data reviewed today: I reviewed all new labs and imaging results over the last 24 hours. I personally reviewed no images or EKG's today.    Physical Exam   Temp: 97.6  F (36.4  C) Temp src: Oral BP: 161/78   Heart Rate: 52 Resp: 16 SpO2: 94 % O2 Device: None (Room air)    Vitals:    10/12/17 1835 10/12/17 2333   Weight: 90.7 kg (200 lb) 94.1 kg (207 lb 6.4 oz)     Vital Signs with Ranges  Temp:  [97.2  F (36.2  C)-97.6  F (36.4  C)] 97.6  F (36.4  C)  Heart Rate:  [52-71] 52  Resp:  [16] 16  BP: (106-165)/(65-78) 161/78  SpO2:  [94 %-97 %] 94 %  I/O last 3 completed shifts:  In: 240 [P.O.:240]  Out: -     Constitutional: Resting comfortably, pleasantly confused, NAD  Respiratory: CTAB, no wheeze/rales/rhonchi  Cardiovascular: HRRR, no MGR  GI: S, NT, ND, +BS  Skin/Integumen: scalp incision healing well -- no oozing or bleeding, +bilateral periorbital ecchymosis  Other:      Medications        insulin glargine  35 Units Subcutaneous At Bedtime     insulin aspart  1-10 Units Subcutaneous TID AC     insulin aspart  1-7 Units Subcutaneous At Bedtime     lisinopril (PRINIVIL/ZESTRIL) tablet 20 mg  20 mg Oral BID     metoprolol  75 mg Oral BID       Data     Recent Labs  Lab 10/16/17  0725 10/14/17  0700 10/13/17  0725 10/12/17  2000   WBC  --   --   --  9.4   HGB  --   --  13.4 14.6   MCV  --   --   --  86   PLT  --   --   --  270   INR  --   --  1.04 0.97    145*  " --  138   POTASSIUM 3.5 3.6  --  3.6   CHLORIDE 110* 111*  --  104   CO2 26 25  --  24   BUN 23 23  --  20   CR 1.38* 1.38*  --  1.42*   ANIONGAP 7 9  --  10   IDANIA 8.6 8.4*  --  8.9   GLC 93 191*  --  341*       No results found for this or any previous visit (from the past 24 hour(s)).

## 2017-10-16 NOTE — PLAN OF CARE
Problem: Patient Care Overview  Goal: Plan of Care/Patient Progress Review  Outcome: No Change  Patient alert to self. Denies pain. Sleeping between cares.  Occasionally verbally abusive.  Large scalp laceration, closed with sutures/staples, and well approximated.  Bilateral eyes swollen and bruised. Reddened areas under breasts and cristy area, miconazole powder applied.  Incontinent of urine at times, ambulates to bathroom.  Sitter at bedside.  A1, Impulsive, does not call appropriately. On 72 hr hold per MD and psych. L GALA MCMILLAN  DC pending- DAMIAN working on dc process. Nursing will continue to monitor.

## 2017-10-16 NOTE — PROGRESS NOTES
DAMIAN  I: DAMIAN completed commitment paperwork and called [facility] [phone].    to update and to get fax number to send info. DAMIAN awaits a call back. DAMIAN called to update Miroslava Scmitt with [facility] on updated info. DAMIAN left message.    P: DAMIAN will continue to follow and assist as needed.    DAMIAN spoke with [facility] pre petition screening. DAMIAN will fax info to [phone].    Pre petition screener will be at hospital at 1300 on 10/17 to assess patient.    Gabrielle Sesay, MAU   *[phone]

## 2017-10-17 LAB
GLUCOSE BLDC GLUCOMTR-MCNC: 129 MG/DL (ref 70–99)
GLUCOSE BLDC GLUCOMTR-MCNC: 178 MG/DL (ref 70–99)
GLUCOSE BLDC GLUCOMTR-MCNC: 248 MG/DL (ref 70–99)
GLUCOSE BLDC GLUCOMTR-MCNC: 256 MG/DL (ref 70–99)

## 2017-10-17 PROCEDURE — A9270 NON-COVERED ITEM OR SERVICE: HCPCS | Mod: GY | Performed by: PSYCHIATRY & NEUROLOGY

## 2017-10-17 PROCEDURE — 96372 THER/PROPH/DIAG INJ SC/IM: CPT

## 2017-10-17 PROCEDURE — A9270 NON-COVERED ITEM OR SERVICE: HCPCS | Mod: GY | Performed by: INTERNAL MEDICINE

## 2017-10-17 PROCEDURE — 40000916 ZZH STATISTIC SITTER, NIGHT HOURS

## 2017-10-17 PROCEDURE — 25000132 ZZH RX MED GY IP 250 OP 250 PS 637: Performed by: INTERNAL MEDICINE

## 2017-10-17 PROCEDURE — G0378 HOSPITAL OBSERVATION PER HR: HCPCS

## 2017-10-17 PROCEDURE — 25000132 ZZH RX MED GY IP 250 OP 250 PS 637: Mod: GY | Performed by: PSYCHIATRY & NEUROLOGY

## 2017-10-17 PROCEDURE — 99231 SBSQ HOSP IP/OBS SF/LOW 25: CPT | Performed by: PSYCHIATRY & NEUROLOGY

## 2017-10-17 PROCEDURE — 25000132 ZZH RX MED GY IP 250 OP 250 PS 637: Mod: GY | Performed by: HOSPITALIST

## 2017-10-17 PROCEDURE — 99226 ZZC SUBSEQUENT OBSERVATION CARE,LEVEL III: CPT | Performed by: INTERNAL MEDICINE

## 2017-10-17 PROCEDURE — A9270 NON-COVERED ITEM OR SERVICE: HCPCS | Mod: GY | Performed by: HOSPITALIST

## 2017-10-17 PROCEDURE — 25000131 ZZH RX MED GY IP 250 OP 636 PS 637: Performed by: INTERNAL MEDICINE

## 2017-10-17 PROCEDURE — 00000146 ZZHCL STATISTIC GLUCOSE BY METER IP

## 2017-10-17 RX ORDER — AMLODIPINE BESYLATE 10 MG/1
10 TABLET ORAL DAILY
Status: DISCONTINUED | OUTPATIENT
Start: 2017-10-17 | End: 2017-10-19

## 2017-10-17 RX ADMIN — METOPROLOL TARTRATE 75 MG: 25 TABLET ORAL at 10:14

## 2017-10-17 RX ADMIN — ASPIRIN 325 MG: 325 TABLET, DELAYED RELEASE ORAL at 11:58

## 2017-10-17 RX ADMIN — QUETIAPINE FUMARATE 50 MG: 25 TABLET, FILM COATED ORAL at 17:19

## 2017-10-17 RX ADMIN — METOPROLOL TARTRATE 75 MG: 25 TABLET ORAL at 20:25

## 2017-10-17 RX ADMIN — QUETIAPINE FUMARATE 50 MG: 25 TABLET, FILM COATED ORAL at 20:29

## 2017-10-17 RX ADMIN — INSULIN GLARGINE 35 UNITS: 100 INJECTION, SOLUTION SUBCUTANEOUS at 21:39

## 2017-10-17 RX ADMIN — LISINOPRIL 20 MG: 20 TABLET ORAL at 20:25

## 2017-10-17 RX ADMIN — AMLODIPINE BESYLATE 10 MG: 10 TABLET ORAL at 10:14

## 2017-10-17 RX ADMIN — LISINOPRIL 20 MG: 20 TABLET ORAL at 10:14

## 2017-10-17 NOTE — PROGRESS NOTES
Hospitalist cross cover    Paged regarding uncontrolled hypertension, has no IV access for hydralazine.  Will order clonidine prn.

## 2017-10-17 NOTE — PROVIDER NOTIFICATION
MD Notification    Notified Person:  MD    Notified Persons Name: Dr. Katz    Notification Date/Time: October 16, 2017 2230    Notification Interaction:  Talked with Physician    Purpose of Notification: /89 - evening BP meds given.  Still 182/82 despite medication.  No IV access to given PRN hydralazine.      Orders Received:  Order for PO clonidine prn.    Comments:

## 2017-10-17 NOTE — PLAN OF CARE
Problem: Patient Care Overview  Goal: Plan of Care/Patient Progress Review  Outcome: No Change  hift Update: Patient denies pain. VSS. Hypertensive, did not meet parameters for PRN. Patient restless agitated this shift PRN seroquel given x1. Effective. Large scalp laceration, closed with sutures/staples, and well approximated.  Bilateral eyes  bruised.  Incontinent of urine at times, ambulates to bathroom.  Sitter at bedside.  A1, Impulsive, does not call appropriately. On 72 hr hold per MD and psych.  DC pending- SW working on dc process. Nursing will continue to monitor.

## 2017-10-17 NOTE — PROGRESS NOTES
DAMIAN  I: DAMIAN spoke with Hoa ([phone]) with [facility] pre petition screening who stated they would need information faxed to [phone]. DAMIAN informed Hoa that Dr. Beckford didn't feel patient needed commitment due to having POA. DAMIAN has not confirmed with POA personally-Hoa confimred,  that she would be willing to make decisions for patient's d/c plan. DAMIAN attempted to call 2 times this shift and requested call back. DAMIAN informed Hoa that once it is confirmed with POA that she is planning to be involved throughout d/c process SW will update her-By 8 AM 10/18. DAMIAN awaits a call back from POA. DAMIAN spoke with huong who would like to pursue guardianship for patient. DAMIAN provided her information for [facility]. DAMIAN will follow up to ensure she attempted to contact [facility]. DAMIAN discussed placements with houng and Huong stated patient has been to [facility] in past and liked it. DAMIAN will discuss with POA. Patient has [employer] insurance and has little options for placement as memory care is needed.  DAMIAN will send referral once confirmed with POA. DAMIAN spoke with patient to discuss d/c plan with sitter present. Patient was unable to have conversation as she was quite confused.

## 2017-10-17 NOTE — PROGRESS NOTES
[facility]    Hospitalist Progress Note :     Assessment & Plan      Cumulative Summary: Amber Hull is a 72 year old female who was admitted on 10/12/2017 her PMH is significant for hypertension, paroxysmal afib and DM with suspected cognitive decline and inability to care for herself who was admitted under observation on 10/12/2017 for evaluation after a fall which resulted in a scalp laceration. Patient was admitted for further evaluation and management.she got sutures for the scalp laceration. She is currently living the friend Dayami but due to overall decline in patient status , she does not think that she can continue to help patient care.she was evaluated by psych and was found to have significant memory impairment and did very poor on her MMS, her neurocognitive deficits were consistent with Alzheimer's dementia ,  and  are following closely for disposition      Fall and scalp laceration: un clear etiology of fall , She sustained a scalp laceration that was sutured in the ED. CT scan of her head was negative. This was likely a mechanical fall. Received Tdap vaccine in the ED. stples look clean this morning     -- continue local care , recommend to remove staples in 7-10 days   -- admitted to assist with placement as below  -- no issues with ambulation observed per staff this stay     Worsening cognitive function and inability to care for self, suspect Alzheimer's dementia : Patient had been living with Dayami, a family friend. The patient does not have any relations.  She has had gradual decline in her cognitive function suggestive of worsening dementia.  Dayami thinks that this is the best she has been and says she is worse at times in terms of her memory and cognitive issues.  Despite this the patient continues to drive and is not willing to surrender her driving license and stop driving.  Dayami does not think she will be able to continue to help care for  Ms. Hull.     -- psych consulted, noted to have scored poorly on the MMSE  -- has been started on prn Seroquel and prn Zyprexa for agitation , minimal need in past 24h  -- placed on 72h hold on 10/14 PM (won't take effect until 10/16 at 0001)  -- psych recommending guardianship be pursued  -- likely needs long term care at discharge (memory care vs skilled nursing vs geripsych)  -- SW following to assist with arranging safe discharge plan     DM II, Uncontrolled: A1C 12.8 this stay. No known/reported complications from diabetes.Patient was supposed ot have been taking Lantus 30U daily but hasn't been taking it for some time now.  on presentation. Given dose of Lantus 15U HS on night of admission, titrating dose this stay     Recent Labs  Lab 10/16/17  0800 10/16/17  0725 10/16/17  0153 10/15/17  2225 10/15/17  1856 10/15/17  1146 10/15/17  0158   10/14/17  0700   10/12/17  2000   GLC  --  93  --   --   --   --   --   --  191*  --  341*   BGM 97  --  96 106* 98 185* 204*  < >  --   < >  --         -- BG improved today, cont Lantus 35U HS  -- decreased SSI from high dose to med dose (has been intermittently refusing insulin this stay)     Hypertension: Was supposed to have been taking lisinopril 20mg BID and metoprolol 75mg BID bus had not been taking either of these  paroxysmal atrial fibrillation: was prescribed Apixaban 5mg BID which she has not been taking. Note her CHADs2 VASc score is 4, indicating a 4% yearly stroke risk.    -- continue lisinopril 20mg BID and metoprolol 75mg BID  -- BPs remain variable but trending 140-160 systolic, amlodipine 5mg daily added 10/16, will increase to 10 mg po daily as her BP remained high and required clonidine last night   -- given her suspected underlying cognitive dysfunction and reported fall it is likely not safe to cont anticoagulation in the future, will start her on full-dose aspirin  -- depending upon her disposition plan, if patient is more in safe environment , might  consider starting her back on Eliquis although she remains at high risk for fall      Suspected stage III CKD: Cr 1.42 on admission. No IVFs given. Last labs from 11/2016 showed Cr 1.6. Cr otherwise nl as of 4/2016 in Care Everywhere.     -- suspect element of CKD secondary to poorly controlled hypertension  -- monitor renal function periodically, if Cr trends up may need to stop acei     FEN: no IVFs, lytes stable, regular diet    DVT Prophylaxis: PCDs     Code Status: Full Code. Her friend Dayami does not believe she has a living will.      Disposition: Pending plans to pursue guardianship and arrangement of placement. Suspect several days still.     Appreciate help of psych, SW and CC this stay.    Inessa Barton MD, FACP  Text Page (7am - 6pm)      Interval History   Patient care was assumed this morning, seen and examined, doing ok this morning , denying any complaints , no chest pain or SOB, sitter present in the room.  Psych was re consulted this morning per SS request     -Data reviewed today: I reviewed all new labs and imaging results over the last 24 hours.    I personally reviewed no images or EKG's today.    Physical Exam   Temp: 95.9  F (35.5  C) Temp src: Oral BP: 140/66   Heart Rate: 43 Resp: 15 SpO2: 92 % O2 Device: None (Room air)    Vitals:    10/12/17 1835 10/12/17 2333   Weight: 90.7 kg (200 lb) 94.1 kg (207 lb 6.4 oz)     Vital Signs with Ranges  Temp:  [95.9  F (35.5  C)-100.8  F (38.2  C)] 95.9  F (35.5  C)  Heart Rate:  [43-61] 43  Resp:  [15-18] 15  BP: (110-196)/(54-91) 140/66  SpO2:  [91 %-99 %] 92 %  I/O last 3 completed shifts:  In: 480 [P.O.:480]  Out: 600 [Urine:600]    GENERAL: Alert , awake , pleasantly confused , oriented to person  NAD. Conversational, appropriate.   HEENT: Normocephalic. EOMI. No icterus or injection. Nares normal. Staples present on the scalp , healing well.  LUNGS: Clear to auscultation. No dyspnea at rest.   HEART: Regular rate. Extremities perfused.   ABDOMEN:  Soft, nontender, and nondistended. Positive bowel sounds.   EXTREMITIES: No LE edema noted.   NEUROLOGIC: Moves extremities x4 on command. No acute focal neurologic abnormalities noted.     Medications        amLODIPine  10 mg Oral Daily     insulin aspart  1-7 Units Subcutaneous TID AC     insulin aspart  1-5 Units Subcutaneous At Bedtime     insulin glargine  35 Units Subcutaneous At Bedtime     lisinopril (PRINIVIL/ZESTRIL) tablet 20 mg  20 mg Oral BID     metoprolol  75 mg Oral BID       Data     Recent Labs  Lab 10/16/17  0725 10/14/17  0700 10/13/17  0725 10/12/17  2000   WBC  --   --   --  9.4   HGB  --   --  13.4 14.6   MCV  --   --   --  86   PLT  --   --   --  270   INR  --   --  1.04 0.97    145*  --  138   POTASSIUM 3.5 3.6  --  3.6   CHLORIDE 110* 111*  --  104   CO2 26 25  --  24   BUN 23 23  --  20   CR 1.38* 1.38*  --  1.42*   ANIONGAP 7 9  --  10   IDANIA 8.6 8.4*  --  8.9   GLC 93 191*  --  341*       Imaging:   No results found for this or any previous visit (from the past 24 hour(s)).

## 2017-10-17 NOTE — PLAN OF CARE
Problem: Patient Care Overview  Goal: Plan of Care/Patient Progress Review  Outcome: No Change  Patient denies pain. VSS. Hypertensive, did not meet parameters for PRN. Patient restless agitated this shift PRN seroquel given x1. Effective. Large scalp laceration, closed with sutures/staples, and well approximated.  Bilateral eyes  bruised.  Incontinent of urine at times, ambulates to bathroom.  Sitter at bedside.  A1, Impulsive, does not call appropriately. On 72 hr hold per MD and psych.  DC pending- SW working on dc process. Nursing will continue to monitor.

## 2017-10-17 NOTE — PLAN OF CARE
Problem: Confusion, Chronic (Adult)  Goal: Identify Related Risk Factors and Signs and Symptoms  Related risk factors and signs and symptoms are identified upon initiation of [employer] Clinical Practice Guideline (CPG).   Outcome: No Change   A&Ox1, confused, dissociative speech. Slightly agitated and restless this afternoon,  Seroquel given, effective. VS hypertensive - even after scheduled Metoprolol and lisinopril, call placed to MD, awaiting call back.  Refused dinner, SS insulin held.  Resting comfortably at this time.  Will continue to monitor.

## 2017-10-17 NOTE — CONSULTS
[facility] Psychiatric Consult Follow-up Note      TIME SPENT IN PSYCHIATRY CONSULT: 15 MINUTES.     Interim History     The patient's care was discussed, patient seen and chart notes were reviewed. Pt examined on 88. Tolerating medications without side effects. Side effects, risks, and benefits of medications reviewed with patient. Patient is currently negative for suicide ideation, negative for plan or intent, able to contract no self harm and identify barriers to suicide.  Negative for obsessions, compulsions or psychosis.  The  discovered that the Pt has a healthcare POA named Ludmila Borges. This means commitment is not necessary. She is somewhat calmer and more organized today, but remains confused and disoriented. Seroquel is clearly helping reduce her anxiety and agitation. She does not appear to need geriatric psychiatry, though she will need a greater level of care than can be provided in her home at this point. Social work is considering a referral to a nursing home or TCU. Pt reports she is agreeable to this.      Medications     Current Facility-Administered Medications Ordered in Epic   Medication Dose Route Frequency Last Rate Last Dose     amLODIPine (NORVASC) tablet 10 mg  10 mg Oral Daily   10 mg at 10/17/17 1014     insulin aspart (NovoLOG) inj (RAPID ACTING)  1-7 Units Subcutaneous TID AC         insulin aspart (NovoLOG) inj (RAPID ACTING)  1-5 Units Subcutaneous At Bedtime         cloNIDine (CATAPRES) tablet 0.1 mg  0.1 mg Oral Q4H PRN         insulin glargine (LANTUS) injection 35 Units  35 Units Subcutaneous At Bedtime   35 Units at 10/16/17 2245     miconazole (MICATIN; MICRO GUARD) 2 % powder   Topical Q1H PRN         lisinopril (PRINIVIL/ZESTRIL) tablet 20 mg  20 mg Oral BID   20 mg at 10/17/17 1014     QUEtiapine (SEROquel) tablet 50 mg  50 mg Oral Q2H PRN   50 mg at 10/16/17 1626     OLANZapine zydis (zyPREXA) ODT tab 10 mg  10 mg Oral Q4H PRN   10 mg at 10/13/17 3457  "    OLANZapine (zyPREXA) injection 10 mg  10 mg Intramuscular Q4H PRN         metoprolol (LOPRESSOR) tablet 75 mg  75 mg Oral BID   75 mg at 10/17/17 1014     naloxone (NARCAN) injection 0.1-0.4 mg  0.1-0.4 mg Intravenous Q2 Min PRN         acetaminophen (TYLENOL) tablet 650 mg  650 mg Oral Q4H PRN         acetaminophen (TYLENOL) Suppository 650 mg  650 mg Rectal Q4H PRN         senna-docusate (SENOKOT-S;PERICOLACE) 8.6-50 MG per tablet 1-2 tablet  1-2 tablet Oral BID PRN         bisacodyl (DULCOLAX) Suppository 10 mg  10 mg Rectal Daily PRN         ondansetron (ZOFRAN-ODT) ODT tab 4 mg  4 mg Oral Q6H PRN        Or     ondansetron (ZOFRAN) injection 4 mg  4 mg Intravenous Q6H PRN         traMADol (ULTRAM) half-tab 25 mg  25 mg Oral Q6H PRN         glucose 40 % gel 15-30 g  15-30 g Oral Q15 Min PRN        Or     dextrose 50 % injection 25-50 mL  25-50 mL Intravenous Q15 Min PRN        Or     glucagon injection 1 mg  1 mg Subcutaneous Q15 Min PRN         labetalol (NORMODYNE/TRANDATE) injection 10 mg  10 mg Intravenous Q2H PRN   10 mg at 10/13/17 0208     hydrALAZINE (APRESOLINE) injection 5 mg  5 mg Intravenous Q2H PRN         No current Epic-ordered outpatient prescriptions on file.         Allergies      Allergies   Allergen Reactions     No Known Allergies         Medical Review of Systems     /66 (BP Location: Right arm)  Pulse 69  Temp 95.9  F (35.5  C) (Oral)  Resp 15  Ht 1.651 m (5' 5\")  Wt 94.1 kg (207 lb 6.4 oz)  SpO2 92%  BMI 34.51 kg/m2  Body mass index is 34.51 kg/(m^2).  A 10-point review of systems was performed by Ubaldo Beckford MD and is negative, no new findings.      Psychiatric Examination     Appearance Sitting in chair, dressed in gown. Appears stated age.   Attitude Cooperative   Orientation Oriented to person only   Eye Contact Poor   Speech Regular rate, rhythm, volume and tone   Language Normal   Psychomotor Behavior Calmer   Mood Calmer   Affect Wider range   Thought " Process A little more organized   Associations Some loose associations   Thought Content Patient is currently negative for suicide ideation, negative for plan or intent, able to contract no self harm and identify barriers to suicide. Negative for obsessions, compulsions or psychosis.      Fund of Knowledge Limited   Insight Grossly impaired   Judgement Impaired   Attention Span & Concentration Impaired   Recent & Remote Memory Severely impaired   Gait NA   Muscle Tone Intact       Labs     Labs reviewed.  Recent Results (from the past 24 hour(s))   Glucose by meter    Collection Time: 10/16/17  1:38 PM   Result Value Ref Range    Glucose 129 (H) 70 - 99 mg/dL   Glucose by meter    Collection Time: 10/16/17  5:34 PM   Result Value Ref Range    Glucose 185 (H) 70 - 99 mg/dL   Glucose by meter    Collection Time: 10/16/17 10:36 PM   Result Value Ref Range    Glucose 193 (H) 70 - 99 mg/dL   Glucose by meter    Collection Time: 10/17/17  2:24 AM   Result Value Ref Range    Glucose 129 (H) 70 - 99 mg/dL        Impression     The patient Amber Hull is a 72-year-old woman with a medical history significant for diabetes mellitus, paroxysmal atrial fibrillation, hypertension, and possible Alzheimer's [facility] laceration. She has significant memory impairment. She scored poorly on the Mini Mental Status Exam, indicating significant memory impairment. According to EMS and a friend named Dayami with whom Pt lives, Pt's house is in disarray and there is feces everywhere. Her memory has been deteriorating over time according to Dayami; in November 2016 she presented to the ED after being found wandering around outside in the cold without shoes. Pt is a little less disorganized today, she is calmer. It turns out Pt has a healthcare POA in place named Ludmila Borges who can make decisions for Pt, so commitment is not necessary. She will be referred to a nursing home or TCU, which Pt is  agreeable to. She does not appear to need geriatric psychiatry, however she does need a greater level of care than can be provided at home.      Diagnoses     1. Neurocognitive deficit, consistent with Alzheimer's     Plan     1. Explained Side effects, benefits and complications of medications to the patient.   2. Medication changes: none  3. Discussed treatment plan with patient and team.  4. Pt has a healthcare POA in place named Ludmila Borges, making commitment unnecessary  5. Pt will be discharged to a nursing home or TCU      TIME SPENT IN PSYCHIATRY CONSULT: 15 MINUTES.    Attestation:   Patient has been seen and evaluated by me, Ubaldo Beckford MD.    Patient ID:  Name: Amber Hull    MRN: [id number]  Admission: 10/12/2017    YOB: 1945

## 2017-10-18 LAB
CREAT SERPL-MCNC: 1.85 MG/DL (ref 0.52–1.04)
GFR SERPL CREATININE-BSD FRML MDRD: 27 ML/MIN/1.7M2
GLUCOSE BLDC GLUCOMTR-MCNC: 163 MG/DL (ref 70–99)
GLUCOSE BLDC GLUCOMTR-MCNC: 184 MG/DL (ref 70–99)
GLUCOSE BLDC GLUCOMTR-MCNC: 186 MG/DL (ref 70–99)
GLUCOSE BLDC GLUCOMTR-MCNC: 197 MG/DL (ref 70–99)
GLUCOSE BLDC GLUCOMTR-MCNC: 258 MG/DL (ref 70–99)
POTASSIUM SERPL-SCNC: 4.2 MMOL/L (ref 3.4–5.3)

## 2017-10-18 PROCEDURE — 36415 COLL VENOUS BLD VENIPUNCTURE: CPT | Performed by: INTERNAL MEDICINE

## 2017-10-18 PROCEDURE — 25000132 ZZH RX MED GY IP 250 OP 250 PS 637: Mod: GY | Performed by: INTERNAL MEDICINE

## 2017-10-18 PROCEDURE — 96361 HYDRATE IV INFUSION ADD-ON: CPT

## 2017-10-18 PROCEDURE — A9270 NON-COVERED ITEM OR SERVICE: HCPCS | Mod: GY | Performed by: INTERNAL MEDICINE

## 2017-10-18 PROCEDURE — 25800025 ZZH RX 258: Performed by: INTERNAL MEDICINE

## 2017-10-18 PROCEDURE — G0378 HOSPITAL OBSERVATION PER HR: HCPCS

## 2017-10-18 PROCEDURE — 25000132 ZZH RX MED GY IP 250 OP 250 PS 637: Performed by: HOSPITALIST

## 2017-10-18 PROCEDURE — 99207 ZZC CDG-CODE CATEGORY CHANGED: CPT | Performed by: INTERNAL MEDICINE

## 2017-10-18 PROCEDURE — 00000146 ZZHCL STATISTIC GLUCOSE BY METER IP

## 2017-10-18 PROCEDURE — 25000132 ZZH RX MED GY IP 250 OP 250 PS 637: Mod: GY | Performed by: PSYCHIATRY & NEUROLOGY

## 2017-10-18 PROCEDURE — 96372 THER/PROPH/DIAG INJ SC/IM: CPT

## 2017-10-18 PROCEDURE — 40000916 ZZH STATISTIC SITTER, NIGHT HOURS

## 2017-10-18 PROCEDURE — S5010 5% DEXTROSE AND 0.45% SALINE: HCPCS | Performed by: INTERNAL MEDICINE

## 2017-10-18 PROCEDURE — A9270 NON-COVERED ITEM OR SERVICE: HCPCS | Mod: GY | Performed by: PSYCHIATRY & NEUROLOGY

## 2017-10-18 PROCEDURE — 25000131 ZZH RX MED GY IP 250 OP 636 PS 637: Performed by: INTERNAL MEDICINE

## 2017-10-18 PROCEDURE — 99225 ZZC SUBSEQUENT OBSERVATION CARE,LEVEL II: CPT | Performed by: INTERNAL MEDICINE

## 2017-10-18 PROCEDURE — 82565 ASSAY OF CREATININE: CPT | Performed by: INTERNAL MEDICINE

## 2017-10-18 PROCEDURE — A9270 NON-COVERED ITEM OR SERVICE: HCPCS | Mod: GY | Performed by: HOSPITALIST

## 2017-10-18 PROCEDURE — 84132 ASSAY OF SERUM POTASSIUM: CPT | Performed by: INTERNAL MEDICINE

## 2017-10-18 RX ADMIN — DEXTROSE AND SODIUM CHLORIDE: 5; 450 INJECTION, SOLUTION INTRAVENOUS at 17:03

## 2017-10-18 RX ADMIN — METOPROLOL TARTRATE 75 MG: 25 TABLET ORAL at 07:37

## 2017-10-18 RX ADMIN — INSULIN GLARGINE 35 UNITS: 100 INJECTION, SOLUTION SUBCUTANEOUS at 22:11

## 2017-10-18 RX ADMIN — AMLODIPINE BESYLATE 10 MG: 10 TABLET ORAL at 07:37

## 2017-10-18 RX ADMIN — ASPIRIN 325 MG: 325 TABLET, DELAYED RELEASE ORAL at 07:37

## 2017-10-18 RX ADMIN — QUETIAPINE FUMARATE 50 MG: 25 TABLET, FILM COATED ORAL at 07:36

## 2017-10-18 RX ADMIN — METOPROLOL TARTRATE 75 MG: 25 TABLET ORAL at 20:12

## 2017-10-18 RX ADMIN — QUETIAPINE FUMARATE 50 MG: 25 TABLET, FILM COATED ORAL at 20:13

## 2017-10-18 RX ADMIN — LISINOPRIL 20 MG: 20 TABLET ORAL at 07:37

## 2017-10-18 RX ADMIN — OLANZAPINE 10 MG: 5 TABLET, ORALLY DISINTEGRATING ORAL at 22:11

## 2017-10-18 NOTE — PROGRESS NOTES
DAMIAN  I: DAMIAN spoke with Ludmila to discuss d/c planning. Ludmila confirmed that she is going to stay involved with patient's care. DAMIAN will contact [facility] to update them. DAMIAN discussed placements with Ludmila. Ludmila is ok with a referral being sent to [facility]. SW will send referral and update Ludmila.    P: SW will  Continue to follow and assist as needed.    ADDENDUM  I: DAMIAN spoke with Castro at [facility] and they do not have a bed in memory care unit. DAMIAN sent referral to [facility] due to them having a locked unit. DAMIAN is unsure if they accept [employer].    Gabrielle Sesay, LSW   *[phone]

## 2017-10-18 NOTE — PLAN OF CARE
Problem: Patient Care Overview  Goal: Plan of Care/Patient Progress Review  Patient denies pain. VSS, Hypertension resolved.  Alert to self only, however, patient engages in conversation coherently at times. Attempted shower this shift, but patient declined.  Slightly agitated at times, seroquel given x 2 - effective. BG covered with SS. SW with [facility] for guardianship and discharge when placement is found.

## 2017-10-18 NOTE — PLAN OF CARE
Problem: Patient Care Overview  Goal: Plan of Care/Patient Progress Review  Outcome: No Change  Alert to self only. On 72 hour hold. Up SBA. VSS. Slightly agitated this AM, gave Seroquel x 1, calm and cooperative the rest of the shift. Bed, chair and door alarms in place. Plan for discharge to TCU or [facility] once placement is found. Continue to monitor.

## 2017-10-18 NOTE — PROGRESS NOTES
[facility]    Hospitalist Progress Note :     Assessment & Plan      Cumulative Summary: Amber Hull is a 72 year old female who was admitted on 10/12/2017 her PMH is significant for hypertension, paroxysmal afib and DM with suspected cognitive decline and inability to care for herself who was admitted under observation on 10/12/2017 for evaluation after a fall which resulted in a scalp laceration. Patient was admitted for further evaluation and management.she got sutures for the scalp laceration. She is currently living the friend Dayami but due to overall decline in patient status , she does not think that she can continue to help patient care.she was evaluated by psych and was found to have significant memory impairment and did very poor on her MMS, her neurocognitive deficits were consistent with Alzheimer's dementia ,  and  are following closely for disposition      Fall and scalp laceration: un clear etiology of fall , She sustained a scalp laceration that was sutured in the ED. CT scan of her head was negative. This was likely a mechanical fall. Received Tdap vaccine in the ED. stples look clean this morning     -- continue local care , recommend to remove staples in 7-10 days   -- admitted to assist with placement as below     Worsening cognitive function and inability to care for self, suspect Alzheimer's dementia : Patient had been living with Dayami, a family friend. The patient does not have any relations.  She has had gradual decline in her cognitive function suggestive of worsening dementia.  Dayami thinks that this is the best she has been and says she is worse at times in terms of her memory and cognitive issues.  Despite this the patient continues to drive and is not willing to surrender her driving license and stop driving.  Dayami does not think she will be able to continue to help care for Ms. Hull.   Patient was evaluated by psychiatry as per  their recommendation patient does not need commitment and geriatric psych at this point.  Patient has a healthcare power of  named Ludmila Borges at this time recommending higher level of care like nursing home or TCU-from.    -- psych consulted, noted to have scored poorly on the MMSE  -- has been started on prn Seroquel and prn Zyprexa for agitation , minimal need in past 24h  -- psych evaluated the patient , does not need to pursue commitment as she does have POA for health care  Ludmila Borges  -- likely needs long term care at discharge (memory care vs correction )  -- SW following to assist with arranging safe discharge plan     DM II, Uncontrolled: A1C 12.8 this stay. No known/reported complications from diabetes.Patient was supposed ot have been taking Lantus 30U daily but hasn't been taking it for some time now.  on presentation.      Recent Labs  Lab 10/16/17  0800 10/16/17  0725 10/16/17  0153 10/15/17  2225 10/15/17  1856 10/15/17  1146 10/15/17  0158   10/14/17  0700   10/12/17  2000   GLC  --  93  --   --   --   --   --   --  191*  --  341*   BGM 97  --  96 106* 98 185* 204*  < >  --   < >  --         -- BG improved today, cont Lantus 35U HS  -- decreased SSI from high dose to med dose (has been intermittently refusing insulin this stay)     Hypertension: Was supposed to have been taking lisinopril 20mg BID and metoprolol 75mg BID but had not been taking either of these, now bp is improved on current medications .  paroxysmal atrial fibrillation: was prescribed Apixaban 5mg BID which she has not been taking. Note her CHADs2 VASc score is 4, indicating a 4% yearly stroke risk.    -- Continue Lisinopril 20mg BID and Metoprolol 75mg BID  -- BP was high and now seems to be improved on Norvasc 10 mg p.o. daily.  -- given her suspected underlying cognitive dysfunction and reported fall it is likely not safe to cont anticoagulation in the future, started her on full-dose aspirin  -- depending upon her  disposition plan, if patient is more in safe environment , might consider starting her back on Eliquis although she remains at high risk for fall      Suspected stage III CKD: Cr 1.42 on admission. No IVFs given. Last labs from 11/2016 showed Cr 1.6. Cr otherwise nl as of 4/2016 in Care Everywhere.     -- suspect element of CKD secondary to poorly controlled hypertension  -- Her creatinine is going high we will stop the lisinopril today patient on metoprolol and Norvasc.     FEN: no IVFs, lytes stable, regular diet    DVT Prophylaxis: PCDs     Code Status: Full Code. Her friend Dayami does not believe she has a living will.      Disposition:  per psychiatry patient does not need geriatric psych at this point.  Probably needs higher level of care, social workers have placed referrals to TCU and NH.  More supervised environment might be helpful for her medication intake from her hypertension and diabetes mellitus point of view also.     Appreciate help of psych, SW and CC     Inessa Barton MD, FACP  Text Page (7am - 6pm)      Interval History   Patient was seen and examined this morning, patient was sleeping initially but then woke up on conversation, denying any complaints, today she is without any sitter.  Discussed with bedside RN.    -Data reviewed today: I reviewed all new labs and imaging results over the last 24 hours.    I personally reviewed no images or EKG's today.    Physical Exam   Temp: 95.9  F (35.5  C) Temp src: Axillary BP: 110/50   Heart Rate: 50 Resp: 16 SpO2: 94 % O2 Device: None (Room air)    Vitals:    10/12/17 1835 10/12/17 2333   Weight: 90.7 kg (200 lb) 94.1 kg (207 lb 6.4 oz)     Vital Signs with Ranges  Temp:  [95.9  F (35.5  C)-98.4  F (36.9  C)] 95.9  F (35.5  C)  Heart Rate:  [50-72] 50  Resp:  [16] 16  BP: (106-117)/(50-82) 110/50  SpO2:  [94 %-97 %] 94 %  I/O last 3 completed shifts:  In: 660 [P.O.:660]  Out: 800 [Urine:800]    GENERAL: Alert , awake , pleasantly confused , oriented to  person  NAD. Conversational, appropriate.   HEENT: Normocephalic. EOMI. No icterus or injection. Nares normal. Staples present on the scalp , healing well.  LUNGS: Clear to auscultation. No dyspnea at rest.   HEART: Regular rate. Extremities perfused.   ABDOMEN: Soft, nontender, and nondistended. Positive bowel sounds.   EXTREMITIES: No LE edema noted.   NEUROLOGIC: Moves extremities x4 on command. No acute focal neurologic abnormalities noted.     Medications        amLODIPine  10 mg Oral Daily     aspirin EC  325 mg Oral Daily     insulin aspart  1-7 Units Subcutaneous TID AC     insulin aspart  1-5 Units Subcutaneous At Bedtime     insulin glargine  35 Units Subcutaneous At Bedtime     lisinopril (PRINIVIL/ZESTRIL) tablet 20 mg  20 mg Oral BID     metoprolol  75 mg Oral BID       Data     Recent Labs  Lab 10/18/17  0708 10/16/17  0725 10/14/17  0700 10/13/17  0725 10/12/17  2000   WBC  --   --   --   --  9.4   HGB  --   --   --  13.4 14.6   MCV  --   --   --   --  86   PLT  --   --   --   --  270   INR  --   --   --  1.04 0.97   NA  --  143 145*  --  138   POTASSIUM 4.2 3.5 3.6  --  3.6   CHLORIDE  --  110* 111*  --  104   CO2  --  26 25  --  24   BUN  --  23 23  --  20   CR 1.85* 1.38* 1.38*  --  1.42*   ANIONGAP  --  7 9  --  10   IDANIA  --  8.6 8.4*  --  8.9   GLC  --  93 191*  --  341*       Imaging:   No results found for this or any previous visit (from the past 24 hour(s)).

## 2017-10-18 NOTE — PLAN OF CARE
Problem: Patient Care Overview  Goal: Plan of Care/Patient Progress Review  Outcome: No Change  Alert to self only; confused to place, time and situation although patient did know it was October. Does converse coherently at times, but for short moments. Dissociative/illogical speech at times. Calm/cooperative on shift. Restless at times, no agitation. PRN Seroquel and Zyprexa available. Sitter at bedside. Denies pain. VSS. Large midline scalp laceration, closed with sutures/staples. Bilateral eyes swollen and bruised.  Ambulates to bathroom with SBA; voided on shift. Last charted BM= 10/14. No IV access. Slept well on shift.

## 2017-10-19 LAB
ANION GAP SERPL CALCULATED.3IONS-SCNC: 9 MMOL/L (ref 3–14)
BUN SERPL-MCNC: 50 MG/DL (ref 7–30)
CALCIUM SERPL-MCNC: 8.1 MG/DL (ref 8.5–10.1)
CHLORIDE SERPL-SCNC: 108 MMOL/L (ref 94–109)
CO2 SERPL-SCNC: 21 MMOL/L (ref 20–32)
CREAT SERPL-MCNC: 1.84 MG/DL (ref 0.52–1.04)
GFR SERPL CREATININE-BSD FRML MDRD: 27 ML/MIN/1.7M2
GLUCOSE BLDC GLUCOMTR-MCNC: 130 MG/DL (ref 70–99)
GLUCOSE BLDC GLUCOMTR-MCNC: 171 MG/DL (ref 70–99)
GLUCOSE BLDC GLUCOMTR-MCNC: 181 MG/DL (ref 70–99)
GLUCOSE BLDC GLUCOMTR-MCNC: 207 MG/DL (ref 70–99)
GLUCOSE BLDC GLUCOMTR-MCNC: 216 MG/DL (ref 70–99)
GLUCOSE SERPL-MCNC: 155 MG/DL (ref 70–99)
POTASSIUM SERPL-SCNC: 4.4 MMOL/L (ref 3.4–5.3)
SODIUM SERPL-SCNC: 138 MMOL/L (ref 133–144)

## 2017-10-19 PROCEDURE — 96372 THER/PROPH/DIAG INJ SC/IM: CPT

## 2017-10-19 PROCEDURE — 36415 COLL VENOUS BLD VENIPUNCTURE: CPT | Performed by: INTERNAL MEDICINE

## 2017-10-19 PROCEDURE — 99207 ZZC CDG-CODE CATEGORY CHANGED: CPT | Performed by: INTERNAL MEDICINE

## 2017-10-19 PROCEDURE — 25000132 ZZH RX MED GY IP 250 OP 250 PS 637: Performed by: HOSPITALIST

## 2017-10-19 PROCEDURE — A9270 NON-COVERED ITEM OR SERVICE: HCPCS | Mod: GY | Performed by: PSYCHIATRY & NEUROLOGY

## 2017-10-19 PROCEDURE — 99225 ZZC SUBSEQUENT OBSERVATION CARE,LEVEL II: CPT | Performed by: INTERNAL MEDICINE

## 2017-10-19 PROCEDURE — A9270 NON-COVERED ITEM OR SERVICE: HCPCS | Mod: GY | Performed by: INTERNAL MEDICINE

## 2017-10-19 PROCEDURE — 25800025 ZZH RX 258: Performed by: INTERNAL MEDICINE

## 2017-10-19 PROCEDURE — S5010 5% DEXTROSE AND 0.45% SALINE: HCPCS | Performed by: INTERNAL MEDICINE

## 2017-10-19 PROCEDURE — 25000132 ZZH RX MED GY IP 250 OP 250 PS 637: Mod: GY | Performed by: INTERNAL MEDICINE

## 2017-10-19 PROCEDURE — A9270 NON-COVERED ITEM OR SERVICE: HCPCS | Mod: GY | Performed by: HOSPITALIST

## 2017-10-19 PROCEDURE — 80048 BASIC METABOLIC PNL TOTAL CA: CPT | Performed by: INTERNAL MEDICINE

## 2017-10-19 PROCEDURE — G0378 HOSPITAL OBSERVATION PER HR: HCPCS

## 2017-10-19 PROCEDURE — 96361 HYDRATE IV INFUSION ADD-ON: CPT

## 2017-10-19 PROCEDURE — 25000132 ZZH RX MED GY IP 250 OP 250 PS 637: Performed by: PSYCHIATRY & NEUROLOGY

## 2017-10-19 PROCEDURE — 00000146 ZZHCL STATISTIC GLUCOSE BY METER IP

## 2017-10-19 PROCEDURE — 25000131 ZZH RX MED GY IP 250 OP 636 PS 637: Performed by: INTERNAL MEDICINE

## 2017-10-19 RX ORDER — AMLODIPINE BESYLATE 5 MG/1
5 TABLET ORAL DAILY
Status: DISCONTINUED | OUTPATIENT
Start: 2017-10-20 | End: 2017-10-21

## 2017-10-19 RX ADMIN — ASPIRIN 325 MG: 325 TABLET, DELAYED RELEASE ORAL at 09:18

## 2017-10-19 RX ADMIN — METOPROLOL TARTRATE 75 MG: 25 TABLET ORAL at 20:23

## 2017-10-19 RX ADMIN — OLANZAPINE 10 MG: 5 TABLET, ORALLY DISINTEGRATING ORAL at 22:32

## 2017-10-19 RX ADMIN — AMLODIPINE BESYLATE 10 MG: 10 TABLET ORAL at 09:18

## 2017-10-19 RX ADMIN — DEXTROSE AND SODIUM CHLORIDE: 5; 450 INJECTION, SOLUTION INTRAVENOUS at 11:20

## 2017-10-19 RX ADMIN — METOPROLOL TARTRATE 75 MG: 25 TABLET ORAL at 09:18

## 2017-10-19 RX ADMIN — QUETIAPINE FUMARATE 50 MG: 25 TABLET, FILM COATED ORAL at 20:24

## 2017-10-19 RX ADMIN — INSULIN GLARGINE 35 UNITS: 100 INJECTION, SOLUTION SUBCUTANEOUS at 22:33

## 2017-10-19 NOTE — PROGRESS NOTES
"BRIEF NUTRITION ASSESSMENT      REASON FOR ASSESSMENT:  Amber Hull is a 72 year old female seen by Registered Dietitian for [facility]    NUTRITION HISTORY:  Unable to obtain nutrition history at this time. Per RN notes alert to self only.  Per Chart review, RD note 5/28/16:  \"Pt states she does not count carbs or follow a special diet.  She eats 2 meals/day, \"I've never, ever been a breakfast eater\".\"    CURRENT DIET AND INTAKE:  Diet:  Regular diet    -Per RN flowsheet pt consuming most of meals, 0%-100% of meals  -D5 + NaCl at 100mL/ hr, providing 408 kcal/day  -BGM range 163-258 (MSSI + Lantus 35 units daily)    ANTHROPOMETRICS:  Height: 5'5\"  Weight: 94.1 kg  BMI: 34.51 kg/m2  IBW:  56.8 kg   Weight Status: Obesity Grade I BMI 30-34.9  %IBW: 166%  Weight History: wt stable over the last year, wt trending up this admission   Wt Readings from Last 10 Encounters:   10/12/17 94.1 kg (207 lb 6.4 oz)   06/08/16 94.4 kg (208 lb 3.2 oz)   06/06/16 94.8 kg (209 lb 1.6 oz)   06/02/16 98.5 kg (217 lb 3.2 oz)   06/01/16 98 kg (216 lb 0.8 oz)   10/31/07 115.7 kg (255 lb)   09/11/03 108.7 kg (239 lb 9.6 oz)   06/04/02 110 kg (242 lb 6.4 oz)   06/15/01 29 kg (64 lb)       LABS:  BUN 50 (H), Creatinine 1.84 (H)    MALNUTRITION:  Patient does not meet two of the following criteria necessary for diagnosing malnutrition: significant weight loss, reduced intake, subcutaneous fat loss, muscle loss or fluid retention    NUTRITION INTERVENTION:  Nutrition Diagnosis:  No nutrition diagnosis at this time.    Implementation:  Nutrition Education:  not appropriate at this time due to patient condition- pt only aware to self     FOLLOW UP/MONITORING:   Will re-evaluate in 7 - 10 days, or sooner, if re-consulted.      Allie Chris, RD, LD  "

## 2017-10-19 NOTE — PLAN OF CARE
Problem: Patient Care Overview  Goal: Plan of Care/Patient Progress Review  Outcome: No Change  Patient calm and appropriate.  Alert to self, place. Confused and disoriented, speech illogical at times. No indications of pain. IVF infusing at 100/hr. Regular diet. Midline scalp laceration, closed with sutures/staples, and well approximated.  Bilateral eyes bruised.  Incontinent of urine x2. Impulsive does not call. Alarms activated. D/c when placement found. Will continue to monitor.

## 2017-10-19 NOTE — PROGRESS NOTES
DAMIAN  I: DAM[facility] [employer] Sw called [facility] and check ed status, they declined as to no memory care bed. SW called [facility], declined due to needing memory care, [facility] does not have a bed available (Call back Friday), [facility] declined due to not accepting admissions, SW left message for [facility] admissions. These are the only facilities in network with [employer]. SW called [facility] and they will reassess but do not feel they will be able to accept her. Carol with  admissions will update DAMIAN. DAMIAN called [facility] with [facility] and left message to discuss guardianship for patient.      ADDENDUM  I: DAMIAN spoke with [facility] and they would not accept due to needing memory care. DAMIAN called [facility] to check if they accept [employer], SW sent referral and awaits a call back.    P: SW will continue to follow and assist as needed.    ADDENDUM  I: DAMIAN received a call from Christiano, on site  for [facility], stating she felt [facility] would have an appropriate bed for patient and assessment needed to be completed. SW awaits confirmation of bed.  DAMIAN called Ludmila and left message. DAMIAN called Dayami and updated her. Dayami was ok with plan as patient has limited options available. Dayami did contact [facility] to discuss guardianship for patient. DAMIAN received message back from Allie with [facility]. DAMIAN called back and left message.    ADDENDUM  I: DAMIAN received message from [facility] asking about patient's payor source once [employer] stops paying. DAMIAN called Allie at [facility] and was informed that Dayami did call to get guardianship/conservatorship over patient. Allie stated that the facility can apply to be rep payee for patient to ensure payment if facility is questioning payopr source. DAMIAN called to update facility and left message for admissions.     Gabrielle Sesay, MAU   *[phone]

## 2017-10-19 NOTE — PROGRESS NOTES
[facility]    Hospitalist Progress Note :     Assessment & Plan      Cumulative Summary: Amber Hull is a 72 year old female who was admitted on 10/12/2017 her PMH is significant for hypertension, paroxysmal afib and DM with suspected cognitive decline and inability to care for herself who was admitted under observation on 10/12/2017 for evaluation after a fall which resulted in a scalp laceration. Patient was admitted for further evaluation and management.she got sutures for the scalp laceration. She is currently living the friend Dayami but due to overall decline in patient status , she does not think that she can continue to help patient care.she was evaluated by psych and was found to have significant memory impairment and did very poor on her MMS, her neurocognitive deficits were consistent with Alzheimer's dementia ,  and  are following closely for disposition.  During her stay she was started back on her insulin and insulin is adjusted accordingly she is also started back on her antihypertensive medications she seemed to have elevated creatinine and her Ace inhibitors are stopped.     Fall and scalp laceration: un clear etiology of fall , She sustained a scalp laceration that was sutured in the ED. CT scan of her head was negative. This was likely a mechanical fall. Received Tdap vaccine in the ED. stples look clean this morning     -- continue local care , recommend to remove staples in 7-10 days   -- admitted to assist with placement as below     Worsening cognitive function and inability to care for self, suspect Alzheimer's dementia : Patient had been living with Dayami, a family friend. The patient does not have any relations.  She has had gradual decline in her cognitive function suggestive of worsening dementia.  Dayami thinks that this is the best she has been and says she is worse at times in terms of her memory and cognitive issues.  Despite this the  patient continues to drive and is not willing to surrender her driving license and stop driving.  Dayami does not think she will be able to continue to help care for Ms. Hull.   Patient was evaluated by psychiatry as per their recommendation patient does not need commitment and geriatric psych at this point.  Patient has a healthcare power of  named Ludmila Borges at this time recommending higher level of care like nursing home or TCU-from.  Patient is not agitated this morning, cooperative more stable on the current medications    -- psych consulted, noted to have scored poorly on the MMSE  -- has been started on prn Seroquel and prn Zyprexa for agitation , minimal need in past 24h  -- psych evaluated the patient , does not need to pursue commitment as she does have POA for health care  Ludmila Borges  -- likely needs long term care at discharge (memory care vs retirement )  -- SW following to assist with arranging safe discharge plan     DM II, Uncontrolled: A1C 12.8 this stay. No known/reported complications from diabetes.Patient was supposed ot have been taking Lantus 30U daily but hasn't been taking it for some time now.  on presentation.      -- BG improved , cont Lantus 35U HS  -- decreased SSI from high dose to med dose (has been intermittently refusing insulin this stay)     Hypertension: Was supposed to have been taking lisinopril 20mg BID and metoprolol 75mg BID but had not been taking either of these, now bp is improved on current medications . her creatinine has been increased as she is started back on her ACE inhibitor.    paroxysmal atrial fibrillation: was prescribed Apixaban 5mg BID which she has not been taking. Note her CHADs2 VASc score is 4, indicating a 4% yearly stroke risk.    -- will continue patient on metoprolol 75 mg p.o. B.i.d.  -- We'll discontinue lisinopril, check BMP tomorrow morning and continue on gentle hydration  -- We'll also decrease her Norvasc to 5 mg p.o. daily, will  aim for systolic blood pressure over 0.20 to help with renal perfusion.  -- given her suspected underlying cognitive dysfunction and reported fall it is likely not safe to cont anticoagulation in the future, started her on full-dose aspirin  -- Depending upon her disposition plan, if patient is more in safe environment , might consider starting her back on Eliquis although she remains at high risk for fall      Suspected stage III CKD: Cr 1.42 on admission. No IVFs given. Last labs from 11/2016 showed Cr 1.6. Cr otherwise nl as of 4/2016 in Care Everywhere. they have creatinine is around 1.8.  Likely patient is unable to tolerate Ace inhibitors and the presence of her chronic kidney disease .    -- We will stop ace inhibitors   -- Continue patient on gentle hydration today and check BMP tomorrow morning.    -- We'll also decrease Norvasc to 5 mg p.o. daily for improved renal perfusion and aim for systolic blood pressure more than 120     FEN: on IVFs, lytes stable, regular diet    DVT Prophylaxis: PCDs     Code Status: Full Code. Her friend Dayami does not believe she has a living will.      Disposition:  per psychiatry patient does not need geriatric psych at this point.  Probably needs higher level of care, social workers have placed referrals to TCU and NH.  More supervised environment might be helpful for her medication intake from her hypertension and diabetes mellitus point of view also.     Appreciate help of psych, SW and CC , tentative discharge tomorrow if arrangements are made     Inessa Barton MD, FACP  Text Page (7am - 6pm)      Interval History     She was seen and examined, sitting in the chair, much more alert and awake, cooperative and calm, following commands, does have significant confusion secondary to memory issues but polite and following commands.    She is also doing really well without the sitters on current psych medications.  We discussed about stopping her lisinopril patient is in agreement  and hoping to be discharged from the hospital soon.      -Data reviewed today: I reviewed all new labs and imaging results over the last 24 hours.    I personally reviewed no images or EKG's today.    Physical Exam   Temp: 98.4  F (36.9  C) Temp src: Oral BP: 99/50 Pulse: 82 Heart Rate: 52 Resp: 18 SpO2: 95 % O2 Device: None (Room air)    Vitals:    10/12/17 1835 10/12/17 2333   Weight: 90.7 kg (200 lb) 94.1 kg (207 lb 6.4 oz)     Vital Signs with Ranges  Temp:  [96.7  F (35.9  C)-98.4  F (36.9  C)] 98.4  F (36.9  C)  Pulse:  [82] 82  Heart Rate:  [52-90] 52  Resp:  [16-18] 18  BP: ()/(43-57) 99/50  SpO2:  [91 %-97 %] 95 %  I/O last 3 completed shifts:  In: 880 [P.O.:880]  Out: 800 [Urine:800]    GENERAL: Alert , awake , pleasantly confused , oriented to person  NAD. Conversational, appropriate.sitting in the chair   HEENT: Normocephalic. EOMI. No icterus or injection. Nares normal. Staples present on the scalp , healing well.  LUNGS: Clear to auscultation. No dyspnea at rest.   HEART: Regular rate. Extremities perfused.   ABDOMEN: Soft, nontender, and nondistended. Positive bowel sounds.   EXTREMITIES: No LE edema noted.   NEUROLOGIC: Moves extremities x4 on command. No acute focal neurologic abnormalities noted.     Medications     dextrose 5% and 0.45% NaCl 100 mL/hr at 10/19/17 1120       [START ON 10/20/2017] amLODIPine  5 mg Oral Daily     aspirin EC  325 mg Oral Daily     insulin aspart  1-7 Units Subcutaneous TID AC     insulin aspart  1-5 Units Subcutaneous At Bedtime     insulin glargine  35 Units Subcutaneous At Bedtime     metoprolol  75 mg Oral BID       Data     Recent Labs  Lab 10/19/17  0737 10/18/17  0708 10/16/17  0725 10/14/17  0700 10/13/17  0725 10/12/17  2000   WBC  --   --   --   --   --  9.4   HGB  --   --   --   --  13.4 14.6   MCV  --   --   --   --   --  86   PLT  --   --   --   --   --  270   INR  --   --   --   --  1.04 0.97     --  143 145*  --  138   POTASSIUM 4.4 4.2 3.5  3.6  --  3.6   CHLORIDE 108  --  110* 111*  --  104   CO2 21  --  26 25  --  24   BUN 50*  --  23 23  --  20   CR 1.84* 1.85* 1.38* 1.38*  --  1.42*   ANIONGAP 9  --  7 9  --  10   IDANIA 8.1*  --  8.6 8.4*  --  8.9   *  --  93 191*  --  341*       Imaging:   No results found for this or any previous visit (from the past 24 hour(s)).

## 2017-10-19 NOTE — PLAN OF CARE
Problem: Confusion, Chronic (Adult)  Goal: Cognitive/Functional Impairments Minimized  Patient will demonstrate the desired outcomes by discharge/transition of care.   Outcome: No Change  No sitter at bedside this shift.  Alarms in place, but patient had IVF initiated at beginning of shift and concern was patient would pull at lines or be an additional fall hazard. VMP initiated to help with redirection.  Patient does not listen when VPM is trying to redirect.  Alert to self only.  Has asked to go home several times this shift - flight risk, door alarm in place. Seroquel given x 1 at HS. D/c to TCU or NH when placement if found.

## 2017-10-19 NOTE — PLAN OF CARE
Problem: Patient Care Overview  Goal: Plan of Care/Patient Progress Review  Outcome: No Change  Patient calm and sleeping most of shift after zyprexa. Alarms activated. No indications of pain. IVF infusing at 100/hr. Regular diet. Midline scalp laceration, closed with sutures/staples, and well approximated.  Bilateral eyes  bruised.  Incontinent of urine at times, ambulates to bathroom. Impulsive does not call. D/c when placement found. Will continue to monitor.

## 2017-10-19 NOTE — PROGRESS NOTES
VPM was initiated on patient this afternoon to help with redirection and commands for impulsiveness and/or pulling at lines.  VMP not able redirect patient d/t the fact that patient does not listen to commands or cannot fully hear.  At this time VMP not appropriate and writer will discontinue.

## 2017-10-19 NOTE — PLAN OF CARE
Problem: Patient Care Overview  Goal: Plan of Care/Patient Progress Review  Outcome: No Change  No sitter at bedside this shift.  Alarms in place, but patient had IVF initiated at beginning of shift and concern was patient would pull at lines or be an additional fall hazard. VMP initiated to help with redirection.  Patient does not listen when VPM is trying to redirect.  Alert to self only.  Has asked to go home several times this shift - flight risk, door alarm in place. Seroquel given x 1 at HS. D/c to TCU or NH when placement if found.

## 2017-10-20 LAB
ANION GAP SERPL CALCULATED.3IONS-SCNC: 6 MMOL/L (ref 3–14)
BUN SERPL-MCNC: 49 MG/DL (ref 7–30)
CALCIUM SERPL-MCNC: 8.2 MG/DL (ref 8.5–10.1)
CHLORIDE SERPL-SCNC: 115 MMOL/L (ref 94–109)
CO2 SERPL-SCNC: 24 MMOL/L (ref 20–32)
CREAT SERPL-MCNC: 1.65 MG/DL (ref 0.52–1.04)
GFR SERPL CREATININE-BSD FRML MDRD: 31 ML/MIN/1.7M2
GLUCOSE BLDC GLUCOMTR-MCNC: 121 MG/DL (ref 70–99)
GLUCOSE BLDC GLUCOMTR-MCNC: 176 MG/DL (ref 70–99)
GLUCOSE BLDC GLUCOMTR-MCNC: 189 MG/DL (ref 70–99)
GLUCOSE BLDC GLUCOMTR-MCNC: 212 MG/DL (ref 70–99)
GLUCOSE SERPL-MCNC: 125 MG/DL (ref 70–99)
POTASSIUM SERPL-SCNC: 4.8 MMOL/L (ref 3.4–5.3)
SODIUM SERPL-SCNC: 145 MMOL/L (ref 133–144)

## 2017-10-20 PROCEDURE — 25000131 ZZH RX MED GY IP 250 OP 636 PS 637: Performed by: INTERNAL MEDICINE

## 2017-10-20 PROCEDURE — 80048 BASIC METABOLIC PNL TOTAL CA: CPT | Performed by: INTERNAL MEDICINE

## 2017-10-20 PROCEDURE — 25000128 H RX IP 250 OP 636: Performed by: INTERNAL MEDICINE

## 2017-10-20 PROCEDURE — G0378 HOSPITAL OBSERVATION PER HR: HCPCS

## 2017-10-20 PROCEDURE — A9270 NON-COVERED ITEM OR SERVICE: HCPCS | Mod: GY | Performed by: HOSPITALIST

## 2017-10-20 PROCEDURE — 36415 COLL VENOUS BLD VENIPUNCTURE: CPT | Performed by: INTERNAL MEDICINE

## 2017-10-20 PROCEDURE — 00000146 ZZHCL STATISTIC GLUCOSE BY METER IP

## 2017-10-20 PROCEDURE — A9270 NON-COVERED ITEM OR SERVICE: HCPCS | Mod: GY | Performed by: INTERNAL MEDICINE

## 2017-10-20 PROCEDURE — 96361 HYDRATE IV INFUSION ADD-ON: CPT

## 2017-10-20 PROCEDURE — 96372 THER/PROPH/DIAG INJ SC/IM: CPT

## 2017-10-20 PROCEDURE — 25000132 ZZH RX MED GY IP 250 OP 250 PS 637: Mod: GY | Performed by: HOSPITALIST

## 2017-10-20 PROCEDURE — 99225 ZZC SUBSEQUENT OBSERVATION CARE,LEVEL II: CPT | Performed by: INTERNAL MEDICINE

## 2017-10-20 PROCEDURE — 25000132 ZZH RX MED GY IP 250 OP 250 PS 637: Performed by: INTERNAL MEDICINE

## 2017-10-20 RX ADMIN — AMLODIPINE BESYLATE 5 MG: 5 TABLET ORAL at 09:35

## 2017-10-20 RX ADMIN — METOPROLOL TARTRATE 75 MG: 25 TABLET ORAL at 20:26

## 2017-10-20 RX ADMIN — DEXTROSE AND SODIUM CHLORIDE 1000 ML: 5; 200 INJECTION, SOLUTION INTRAVENOUS at 09:35

## 2017-10-20 RX ADMIN — ASPIRIN 325 MG: 325 TABLET, DELAYED RELEASE ORAL at 09:35

## 2017-10-20 RX ADMIN — INSULIN GLARGINE 35 UNITS: 100 INJECTION, SOLUTION SUBCUTANEOUS at 21:52

## 2017-10-20 RX ADMIN — DEXTROSE AND SODIUM CHLORIDE 1000 ML: 5; 200 INJECTION, SOLUTION INTRAVENOUS at 20:33

## 2017-10-20 RX ADMIN — METOPROLOL TARTRATE 75 MG: 25 TABLET ORAL at 09:34

## 2017-10-20 NOTE — PROGRESS NOTES
[facility]    Hospitalist Progress Note :     Assessment & Plan      Cumulative Summary: Amber Hull is a 72 year old female who was admitted on 10/12/2017 her PMH is significant for hypertension, paroxysmal afib and DM with suspected cognitive decline and inability to care for herself who was admitted under observation on 10/12/2017 for evaluation after a fall which resulted in a scalp laceration. Patient was admitted for further evaluation and management.she got sutures for the scalp laceration. She is currently living the friend Dayami but due to overall decline in patient status , she does not think that she can continue to help patient care.she was evaluated by psych and was found to have significant memory impairment and did very poor on her MMS, her neurocognitive deficits were consistent with Alzheimer's dementia ,  and  are following closely for disposition.  During her stay she was started back on her insulin and insulin is adjusted accordingly she is also started back on her antihypertensive medications she seemed to have elevated creatinine and her Ace inhibitors are stopped.     Fall and scalp laceration: un clear etiology of fall , She sustained a scalp laceration that was sutured in the ED. CT scan of her head was negative. This was likely a mechanical fall. Received Tdap vaccine in the ED. stples look clean this morning     -- continue local care , recommend to remove staples in 7-10 days   -- admitted to assist with placement as below     Worsening cognitive function and inability to care for self, suspect Alzheimer's dementia : Patient had been living with Dayami, a family friend. The patient does not have any relations.  She has had gradual decline in her cognitive function suggestive of worsening dementia.  Dayami thinks that this is the best she has been and says she is worse at times in terms of her memory and cognitive issues.  Despite this the  patient continues to drive and is not willing to surrender her driving license and stop driving.  Dayami does not think she will be able to continue to help care for Ms. Hull.   Patient was evaluated by psychiatry as per their recommendation patient does not need commitment and geriatric psych at this point.  Patient has a healthcare power of  named Ludmila Borges at this time recommending higher level of care like nursing home or TCU-from.  Patient is not agitated this morning, cooperative more stable on the current medications    -- psych consulted, noted to have scored poorly on the MMSE  -- has been started on prn Seroquel and prn Zyprexa for agitation , minimal need in past 24h  -- psych evaluated the patient , does not need to pursue commitment as she does have POA for health care  Ludmila Borges  -- likely needs long term care at discharge (memory care vs [facility] )  -- SW following to assist with arranging safe discharge plan     DM II, Uncontrolled: A1C 12.8 this stay. No known/reported complications from diabetes.Patient was supposed ot have been taking Lantus 30U daily but hasn't been taking it for some time now.  on presentation.  Now blood sugars are well controlled on 35 units of Lantus at nighttime.     -- BG improved , cont Lantus 35U HS  -- Medium dose sliding scale insulin.      Hypertension: Was supposed to have been taking lisinopril 20mg BID and metoprolol 75mg BID but had not been taking either of these, now bp is improved on current medications . her creatinine has been increased and she was restarted back on her ACE inhibitor, lisinopril was stopped yesterday morning.  paroxysmal atrial fibrillation: was prescribed Apixaban 5mg BID which she has not been taking. Note her CHADs2 VASc score is 4, indicating a 4% yearly stroke risk.    -- will continue patient on metoprolol 75 mg p.o. B.i.d.  -- off lisinopril, check BMP tomorrow morning and continue on gentle hydration, change the  fluids considering hypernatremia, creatinine is improved today.  -- Continue on Norvasc 5 mg p.o. daily.  -- given her suspected underlying cognitive dysfunction and reported fall it is likely not safe to cont anticoagulation in the future, started her on full-dose aspirin  -- Depending upon her disposition plan, if patient is more in safe environment , might consider starting her back on Eliquis although she remains at high risk for fall      Suspected stage III CKD: Cr 1.42 on admission. No IVFs given. Last labs from 11/2016 showed Cr 1.6. Cr otherwise nl as of 4/2016 in Care Everywhere. they have creatinine is around 1.8.  Likely patient is unable to tolerate Ace inhibitors and the presence of her chronic kidney disease .    -- off Ace inhibitors   -- Continue patient on gentle hydration   -- We'll change the fluids to D5 0.2% NaCl and we'll check the BMP tomorrow morning.  -- Continue on Norvasc 5 mg p.o. daily for improved renal perfusion and aim for systolic blood pressure more than 120     FEN: on IVFs, lytes stable, regular diet    DVT Prophylaxis: PCDs     Code Status: Full Code. Her friend Dayami does not believe she has a living will.      Disposition:  per psychiatry patient does not need geriatric psych at this point.  Probably needs higher level of care, social workers have placed referrals to TCU and NH.    More supervised environment might be helpful for her medication intake from her hypertension and diabetes mellitus point of view also.     Appreciate help of psych, SW and CC , tentative discharge when arrangements are made     Inessa Barton MD, FACP  Text Page (7am - 6pm)      Interval History     She was seen and examined, was initially sleeping but then woke up, appropriate and pleasantly confused continues to have issues with memory.  Following commands denying any complaints.    -Data reviewed today: I reviewed all new labs and imaging results over the last 24 hours.    I personally reviewed no  images or EKG's today.    Physical Exam   Temp: 97.1  F (36.2  C) Temp src: Oral BP: 121/50   Heart Rate: 53 Resp: 16 SpO2: 96 % O2 Device: None (Room air)    Vitals:    10/12/17 1835 10/12/17 2333   Weight: 90.7 kg (200 lb) 94.1 kg (207 lb 6.4 oz)     Vital Signs with Ranges  Temp:  [96.2  F (35.7  C)-98.2  F (36.8  C)] 97.1  F (36.2  C)  Heart Rate:  [43-67] 53  Resp:  [16] 16  BP: (121-146)/(50-74) 121/50  SpO2:  [95 %-97 %] 96 %  I/O last 3 completed shifts:  In: 460 [P.O.:460]  Out: 600 [Urine:600]    GENERAL: Alert , awake , pleasantly confused , oriented to person  NAD. Conversational, appropriate.  HEENT: Normocephalic. EOMI. No icterus or injection. Nares normal. Staples present on the scalp , healing well.  LUNGS: Clear to auscultation. No dyspnea at rest.   HEART: Regular rate. Extremities perfused.   ABDOMEN: Soft, nontender, and nondistended. Positive bowel sounds.   EXTREMITIES: No LE edema noted.   NEUROLOGIC: Moves extremities x4 on command. No acute focal neurologic abnormalities noted.     Medications     dextrose 5% and 0.2% NaCl 1,000 mL (10/20/17 0935)       amLODIPine  5 mg Oral Daily     aspirin EC  325 mg Oral Daily     insulin aspart  1-7 Units Subcutaneous TID AC     insulin aspart  1-5 Units Subcutaneous At Bedtime     insulin glargine  35 Units Subcutaneous At Bedtime     metoprolol  75 mg Oral BID       Data     Recent Labs  Lab 10/20/17  0730 10/19/17  0737 10/18/17  0708 10/16/17  0725   * 138  --  143   POTASSIUM 4.8 4.4 4.2 3.5   CHLORIDE 115* 108  --  110*   CO2 24 21  --  26   BUN 49* 50*  --  23   CR 1.65* 1.84* 1.85* 1.38*   ANIONGAP 6 9  --  7   IDANIA 8.2* 8.1*  --  8.6   * 155*  --  93       Imaging:   No results found for this or any previous visit (from the past 24 hour(s)).

## 2017-10-20 NOTE — PROGRESS NOTES
DAMIAN  I: DAMIAN spoke with Raquel at [facility] and she stated they can accept patient and would pursue to be rep payee and have [facility] pursue guardianship. [facility] is pursuing prior auth from [employer] and will update DAMIAN when complete. DAMIAN has a call out to [employer]. SW awaits a call back. DAMIAN feels patient should be on a transport hold for transport to [facility] to ensure she gets there safely. MD can determine if necessary.     P: DAMIAN will continue to follow and assist as needed.    ADDENDUM  I: DAMIAN has attempted to call Ludmila 2x today to update on plan and left voicemail. SW awaits a call back.  DAMIAN called [facility] and left message. SW awaits a call back. DAMIAN continues to await confirmation from [facility] on [employer] Auth.    Gabrielle Sesay, MAU   *[phone]

## 2017-10-20 NOTE — PLAN OF CARE
Problem: Patient Care Overview  Goal: Plan of Care/Patient Progress Review  Outcome: No Change  Patient calm and sleeping most of shift. Alarms activated. No indications of pain. IVF infusing at 100/hr. Regular diet. Midline scalp laceration, closed with sutures/staples, and well approximated.  Bilateral eyes  bruised.  Incontinent of urine at times, ambulates to bathroom. Impulsive does not call. D/c when placement found. Will continue to monitor.

## 2017-10-20 NOTE — PLAN OF CARE
Problem: Patient Care Overview  Goal: Plan of Care/Patient Progress Review  No acute changes this shift.  Mental status unchanged.  Still confused with illogical speech/conversation at times. Calm and cooperative. No pain.  Seroquel and Zyprexa given at HS. Awaiting placement.  Will continue to monitor.

## 2017-10-20 NOTE — PLAN OF CARE
Problem: Patient Care Overview  Goal: Plan of Care/Patient Progress Review  Outcome: No Change  Calm and appropriate. Oriented to self only. VSS, up with A1. Impulsive, alarms on. IVF changed today to D5 0.2%NS, Na 145, Cr 1.65. Midline scalp laceration, closed with sutures/staples, and well approximated.  Bilateral eyes bruised.  Incontinent of urine at times, ambulates to bathroom. D/c when placement found. Will continue to monitor.

## 2017-10-21 LAB
ANION GAP SERPL CALCULATED.3IONS-SCNC: 6 MMOL/L (ref 3–14)
BUN SERPL-MCNC: 41 MG/DL (ref 7–30)
CALCIUM SERPL-MCNC: 8.7 MG/DL (ref 8.5–10.1)
CHLORIDE SERPL-SCNC: 113 MMOL/L (ref 94–109)
CO2 SERPL-SCNC: 22 MMOL/L (ref 20–32)
CREAT SERPL-MCNC: 1.4 MG/DL (ref 0.52–1.04)
GFR SERPL CREATININE-BSD FRML MDRD: 37 ML/MIN/1.7M2
GLUCOSE BLDC GLUCOMTR-MCNC: 156 MG/DL (ref 70–99)
GLUCOSE BLDC GLUCOMTR-MCNC: 197 MG/DL (ref 70–99)
GLUCOSE BLDC GLUCOMTR-MCNC: 209 MG/DL (ref 70–99)
GLUCOSE BLDC GLUCOMTR-MCNC: 227 MG/DL (ref 70–99)
GLUCOSE BLDC GLUCOMTR-MCNC: 232 MG/DL (ref 70–99)
GLUCOSE SERPL-MCNC: 203 MG/DL (ref 70–99)
POTASSIUM SERPL-SCNC: 4.8 MMOL/L (ref 3.4–5.3)
SODIUM SERPL-SCNC: 141 MMOL/L (ref 133–144)

## 2017-10-21 PROCEDURE — 99207 ZZC CDG-CODE CATEGORY CHANGED: CPT | Performed by: INTERNAL MEDICINE

## 2017-10-21 PROCEDURE — A9270 NON-COVERED ITEM OR SERVICE: HCPCS | Mod: GY | Performed by: INTERNAL MEDICINE

## 2017-10-21 PROCEDURE — 00000146 ZZHCL STATISTIC GLUCOSE BY METER IP

## 2017-10-21 PROCEDURE — 25000131 ZZH RX MED GY IP 250 OP 636 PS 637: Performed by: INTERNAL MEDICINE

## 2017-10-21 PROCEDURE — 80048 BASIC METABOLIC PNL TOTAL CA: CPT | Performed by: INTERNAL MEDICINE

## 2017-10-21 PROCEDURE — A9270 NON-COVERED ITEM OR SERVICE: HCPCS | Mod: GY | Performed by: HOSPITALIST

## 2017-10-21 PROCEDURE — 99225 ZZC SUBSEQUENT OBSERVATION CARE,LEVEL II: CPT | Performed by: INTERNAL MEDICINE

## 2017-10-21 PROCEDURE — 25000132 ZZH RX MED GY IP 250 OP 250 PS 637: Mod: GY | Performed by: INTERNAL MEDICINE

## 2017-10-21 PROCEDURE — 96372 THER/PROPH/DIAG INJ SC/IM: CPT

## 2017-10-21 PROCEDURE — 36415 COLL VENOUS BLD VENIPUNCTURE: CPT | Performed by: INTERNAL MEDICINE

## 2017-10-21 PROCEDURE — G0378 HOSPITAL OBSERVATION PER HR: HCPCS

## 2017-10-21 PROCEDURE — 25000132 ZZH RX MED GY IP 250 OP 250 PS 637: Mod: GY | Performed by: HOSPITALIST

## 2017-10-21 PROCEDURE — 96361 HYDRATE IV INFUSION ADD-ON: CPT

## 2017-10-21 RX ORDER — AMLODIPINE BESYLATE 10 MG/1
10 TABLET ORAL DAILY
Status: DISCONTINUED | OUTPATIENT
Start: 2017-10-21 | End: 2017-11-01 | Stop reason: HOSPADM

## 2017-10-21 RX ADMIN — INSULIN GLARGINE 35 UNITS: 100 INJECTION, SOLUTION SUBCUTANEOUS at 21:59

## 2017-10-21 RX ADMIN — ASPIRIN 325 MG: 325 TABLET, DELAYED RELEASE ORAL at 09:28

## 2017-10-21 RX ADMIN — METOPROLOL TARTRATE 75 MG: 25 TABLET ORAL at 09:28

## 2017-10-21 RX ADMIN — AMLODIPINE BESYLATE 10 MG: 10 TABLET ORAL at 09:28

## 2017-10-21 RX ADMIN — METOPROLOL TARTRATE 75 MG: 25 TABLET ORAL at 20:16

## 2017-10-21 NOTE — PLAN OF CARE
Problem: Patient Care Overview  Goal: Plan of Care/Patient Progress Review  A/O to self only.  Pleasant, easily re-directed.  VSS on RA. Head wound staples intact, no drainage, no redness, slight edema. Patient incontinent at times. Up with 1 + walker, can be impulsive. Tolerating regular diet. Glucose corrected per SSI. Patient denies pain, SOB, nausea, numbness/tingling.  Plan: Will D/C when placement found at LTC facility. Will continue to monitor

## 2017-10-21 NOTE — PLAN OF CARE
Problem: Patient Care Overview  Goal: Plan of Care/Patient Progress Review  Outcome: No Change    Shift Update: Patient is oriented to self only. Calm, appropriate, and pleasant on this shift. No complaints of pain. VSS. Midline scalp laceration, closed with sutures/staples, and well approximated.  Bilateral eyes bruised. Patient is able to ambulate to the bathroom with assist x 1. Can be incontinent of urine at times. IVF changed today to D5 0.2%NS. Plan: Will D/C when placement found at LTC facility.

## 2017-10-21 NOTE — PROGRESS NOTES
[id number] Patient alert to self only. Bradycardic asymptomatic other VSS on RA. Multiple bruises on body, Bilateral eye bruising. BLE blotchy. Head wound staples intact, no drainage, no redness, slight edema. Patient incontinent at times. Up with 1 + walker. Tolerating regular diet. BG @0200 227.Patient denies pain, SOB, nausea, numbness/tingling.  Plan: Will D/C when placement found at LTC facility. Will continue to monitor

## 2017-10-21 NOTE — PROGRESS NOTES
Social Work Services Progress Note    Hospital Day: 9  Date of Initial Social Work Evaluation: 10/13/17  Collaborated with: LOIDA    Data: Pt is 72 year old female currently admitted due to a fall  Intervention: Call was [place] to [facility] [place] check if they received insurance authorization for the pt. No auth was received, will need to wait for [employer] auth prior to DC/    Plan:    Discharge Plans in Progress: DC to TCU at [facility]    Barriers to d/c plan: Insurance authorization    Follow up Plan:  will confirm insurance approval on Monday 10/23/17 with LOIDA.    HOLLY Beal, [facility] HospitalSW  Phone [phone]

## 2017-10-21 NOTE — PROGRESS NOTES
[facility]    Hospitalist Progress Note :     Assessment & Plan      Cumulative Summary: Amber Hull is a 72 year old female who was admitted on 10/12/2017 her PMH is significant for hypertension, paroxysmal afib and DM with suspected cognitive decline and inability to care for herself who was admitted under observation on 10/12/2017 for evaluation after a fall which resulted in a scalp laceration. Patient was admitted for further evaluation and management.she got sutures for the scalp laceration. She is currently living the friend Dayami but due to overall decline in patient status , she does not think that she can continue to help patient care.she was evaluated by psych and was found to have significant memory impairment and did very poor on her MMS, her neurocognitive deficits were consistent with Alzheimer's dementia ,  and  are following closely for disposition.  During her stay she was started back on her insulin and insulin is adjusted accordingly she is also started back on her antihypertensive medications she seemed to have elevated creatinine and her Ace inhibitors are stopped.  She was also started back on IV fluids and her creatinine is improved.     Fall and scalp laceration: un clear etiology of fall , She sustained a scalp laceration that was sutured in the ED. CT scan of her head was negative. This was likely a mechanical fall. Received Tdap vaccine in the ED. stples look clean this morning     -- continue local care , recommend to remove staples in 7-10 days   -- admitted to assist with placement as below     Worsening cognitive function and inability to care for self, suspect Alzheimer's dementia : Patient had been living with Dayami, a family friend. The patient does not have any relations.  She has had gradual decline in her cognitive function suggestive of worsening dementia.  Dayami thinks that this is the best she has been and says she is worse  at times in terms of her memory and cognitive issues.  Despite this the patient continues to drive and is not willing to surrender her driving license and stop driving.  Dayami does not think she will be able to continue to help care for Ms. Hull.   Patient was evaluated by psychiatry as per their recommendation patient does not need commitment and geriatric psych at this point.  Patient has a healthcare power of  named Ludmila Borges at this time recommending higher level of care like nursing home or TCU-from.  Patient is not agitated this morning, cooperative more stable on the current medications    -- psych consulted, noted to have scored poorly on the MMSE  --  prn Seroquel and prn Zyprexa for agitation , she required last dose of Olanzapine on October 19.  -- psych evaluated the patient , does not need to pursue commitment as she does have POA for health care  Ludmila Borges  -- likely needs long term care at discharge (memory care vs senior living )  -- SW following to assist with arranging safe discharge plan     DM II, Uncontrolled: A1C 12.8 this stay. No known/reported complications from diabetes.Patient was supposed ot have been taking Lantus 30U daily but hasn't been taking it for some time now.  on presentation.  Now blood sugars are well controlled on 35 units of Lantus at nighttime.  She was again hyperglycemic yesterday but was most likely secondary to being on D5 fluids now is off fluids , expecting blood sugars to improve.     -- Cont Lantus 35U HS  -- Medium dose sliding scale insulin.      Hypertension: Was supposed to have been taking lisinopril 20mg BID and metoprolol 75mg BID but had not been taking either of these, now bp is improved on current medications .  Her creatinine went up when she was started back on her ACE inhibitor now she is off the lisinopril and received some fluids.  Her creatinine is back in normal range.  Her baseline creatinine is around 1.4-1.6.   paroxysmal atrial  fibrillation: was prescribed Apixaban 5mg BID which she has not been taking. Note her CHADs2 VASc score is 4, indicating a 4% yearly stroke risk.    -- will continue patient on metoprolol 75 mg p.o. B.i.d.  -- off lisinopril  -- Her creatinine is back to normal range we'll stop the IV fluids making her hyperglycemic.   -- I'll increase her Norvasc to 10 mg p.o. daily.   -- Given her suspected underlying cognitive dysfunction and reported fall it is likely not safe to cont anticoagulation in the future, started her on full-dose aspirin  -- Depending upon her disposition plan, if patient is more in safe environment , might consider starting her back on Eliquis although she remains at high risk for fall      Suspected stage III CKD: Cr 1.42 on admission. No IVFs given. Last labs from 11/2016 showed Cr 1.6. Cr otherwise nl as of 4/2016 in Care Everywhere. they have creatinine is around 1.8.  Likely patient is unable to tolerate Ace inhibitors and the presence of her chronic kidney disease .    -- off Ace inhibitors   -- His creatinine is back to baseline, we'll start the IV fluids.    FEN: no IVFs, lytes stable, regular diet    DVT Prophylaxis: PCDs     Code Status: Full Code. Her friend Dayami does not believe she has a living will.      Disposition:  per psychiatry patient does not need geriatric psych at this point.  Probably needs higher level of care, social workers have placed referrals to TCU and NH.    More supervised environment might be helpful for her medication intake from her hypertension and diabetes mellitus point of view also.     Appreciate help of psych, SW and CC , tentative discharge when arrangements are made , she is medically stable to be discharged when bed is available.    Inessa Barton MD, FACP  Text Page (7am - 6pm)      Interval History     She was seen and examined, feeling well, denying any complaint, very cooperative and pleasantly confused, following commands, I reviewed her medication  changes with her.  She is denying any chest pain or shortness of breath or palpitations.    -Data reviewed today: I reviewed all new labs and imaging results over the last 24 hours.  I personally reviewed no images or EKG's today.    Physical Exam   Temp: 97.8  F (36.6  C) Temp src: Oral BP: 141/82   Heart Rate: 56 Resp: 16 SpO2: 95 % O2 Device: None (Room air)    Vitals:    10/12/17 1835 10/12/17 2333   Weight: 90.7 kg (200 lb) 94.1 kg (207 lb 6.4 oz)     Vital Signs with Ranges  Temp:  [97  F (36.1  C)-98.2  F (36.8  C)] 97.8  F (36.6  C)  Heart Rate:  [53-68] 56  Resp:  [16-18] 16  BP: (121-164)/(50-82) 141/82  SpO2:  [95 %-97 %] 95 %  I/O last 3 completed shifts:  In: 1452 [P.O.:810; I.V.:642]  Out: 500 [Urine:500]    GENERAL: Alert , awake , pleasantly confused , oriented to person  NAD. Conversational, appropriate.  HEENT: Normocephalic. EOMI. No icterus or injection. Nares normal. Staples present on the scalp , healing well.  LUNGS: Clear to auscultation. No dyspnea at rest.   HEART: Regular rate. Extremities perfused.   ABDOMEN: Soft, nontender, and nondistended. Positive bowel sounds.   EXTREMITIES: No LE edema noted.   NEUROLOGIC: Moves extremities x4 on command. No acute focal neurologic abnormalities noted.     Medications        amLODIPine  10 mg Oral Daily     aspirin EC  325 mg Oral Daily     insulin aspart  1-7 Units Subcutaneous TID AC     insulin aspart  1-5 Units Subcutaneous At Bedtime     insulin glargine  35 Units Subcutaneous At Bedtime     metoprolol  75 mg Oral BID       Data     Recent Labs  Lab 10/21/17  0647 10/20/17  0730 10/19/17  0737    145* 138   POTASSIUM 4.8 4.8 4.4   CHLORIDE 113* 115* 108   CO2 22 24 21   BUN 41* 49* 50*   CR 1.40* 1.65* 1.84*   ANIONGAP 6 6 9   IDANIA 8.7 8.2* 8.1*   * 125* 155*       Imaging:   No results found for this or any previous visit (from the past 24 hour(s)).

## 2017-10-22 LAB
ANION GAP SERPL CALCULATED.3IONS-SCNC: 8 MMOL/L (ref 3–14)
BUN SERPL-MCNC: 34 MG/DL (ref 7–30)
CALCIUM SERPL-MCNC: 8.9 MG/DL (ref 8.5–10.1)
CHLORIDE SERPL-SCNC: 113 MMOL/L (ref 94–109)
CO2 SERPL-SCNC: 23 MMOL/L (ref 20–32)
CREAT SERPL-MCNC: 1.31 MG/DL (ref 0.52–1.04)
ERYTHROCYTE [DISTWIDTH] IN BLOOD BY AUTOMATED COUNT: 14.5 % (ref 10–15)
GFR SERPL CREATININE-BSD FRML MDRD: 40 ML/MIN/1.7M2
GLUCOSE BLDC GLUCOMTR-MCNC: 163 MG/DL (ref 70–99)
GLUCOSE BLDC GLUCOMTR-MCNC: 165 MG/DL (ref 70–99)
GLUCOSE BLDC GLUCOMTR-MCNC: 231 MG/DL (ref 70–99)
GLUCOSE BLDC GLUCOMTR-MCNC: 241 MG/DL (ref 70–99)
GLUCOSE BLDC GLUCOMTR-MCNC: 242 MG/DL (ref 70–99)
GLUCOSE SERPL-MCNC: 155 MG/DL (ref 70–99)
HCT VFR BLD AUTO: 39.6 % (ref 35–47)
HGB BLD-MCNC: 13 G/DL (ref 11.7–15.7)
MCH RBC QN AUTO: 29.7 PG (ref 26.5–33)
MCHC RBC AUTO-ENTMCNC: 32.8 G/DL (ref 31.5–36.5)
MCV RBC AUTO: 90 FL (ref 78–100)
PLATELET # BLD AUTO: 239 10E9/L (ref 150–450)
POTASSIUM SERPL-SCNC: 5.1 MMOL/L (ref 3.4–5.3)
RBC # BLD AUTO: 4.38 10E12/L (ref 3.8–5.2)
SODIUM SERPL-SCNC: 144 MMOL/L (ref 133–144)
WBC # BLD AUTO: 5.5 10E9/L (ref 4–11)

## 2017-10-22 PROCEDURE — 96375 TX/PRO/DX INJ NEW DRUG ADDON: CPT

## 2017-10-22 PROCEDURE — 99207 ZZC CDG-CODE CATEGORY CHANGED: CPT | Performed by: INTERNAL MEDICINE

## 2017-10-22 PROCEDURE — 25000131 ZZH RX MED GY IP 250 OP 636 PS 637: Performed by: HOSPITALIST

## 2017-10-22 PROCEDURE — 85027 COMPLETE CBC AUTOMATED: CPT | Performed by: INTERNAL MEDICINE

## 2017-10-22 PROCEDURE — 00000146 ZZHCL STATISTIC GLUCOSE BY METER IP

## 2017-10-22 PROCEDURE — 80048 BASIC METABOLIC PNL TOTAL CA: CPT | Performed by: INTERNAL MEDICINE

## 2017-10-22 PROCEDURE — 99225 ZZC SUBSEQUENT OBSERVATION CARE,LEVEL II: CPT | Performed by: INTERNAL MEDICINE

## 2017-10-22 PROCEDURE — A9270 NON-COVERED ITEM OR SERVICE: HCPCS | Mod: GY | Performed by: HOSPITALIST

## 2017-10-22 PROCEDURE — 25000131 ZZH RX MED GY IP 250 OP 636 PS 637: Performed by: INTERNAL MEDICINE

## 2017-10-22 PROCEDURE — 25000128 H RX IP 250 OP 636: Performed by: HOSPITALIST

## 2017-10-22 PROCEDURE — 25000132 ZZH RX MED GY IP 250 OP 250 PS 637: Mod: GY | Performed by: INTERNAL MEDICINE

## 2017-10-22 PROCEDURE — 36415 COLL VENOUS BLD VENIPUNCTURE: CPT | Performed by: INTERNAL MEDICINE

## 2017-10-22 PROCEDURE — 96376 TX/PRO/DX INJ SAME DRUG ADON: CPT

## 2017-10-22 PROCEDURE — A9270 NON-COVERED ITEM OR SERVICE: HCPCS | Mod: GY | Performed by: INTERNAL MEDICINE

## 2017-10-22 PROCEDURE — 96372 THER/PROPH/DIAG INJ SC/IM: CPT

## 2017-10-22 PROCEDURE — G0378 HOSPITAL OBSERVATION PER HR: HCPCS

## 2017-10-22 PROCEDURE — 25000132 ZZH RX MED GY IP 250 OP 250 PS 637: Performed by: HOSPITALIST

## 2017-10-22 RX ORDER — GUANFACINE 1 MG/1
1 TABLET ORAL AT BEDTIME
Status: DISCONTINUED | OUTPATIENT
Start: 2017-10-22 | End: 2017-10-22

## 2017-10-22 RX ORDER — GUANFACINE 1 MG/1
1 TABLET ORAL DAILY
Status: DISCONTINUED | OUTPATIENT
Start: 2017-10-22 | End: 2017-10-26

## 2017-10-22 RX ADMIN — ASPIRIN 325 MG: 325 TABLET, DELAYED RELEASE ORAL at 08:06

## 2017-10-22 RX ADMIN — AMLODIPINE BESYLATE 10 MG: 10 TABLET ORAL at 08:06

## 2017-10-22 RX ADMIN — INSULIN GLARGINE 35 UNITS: 100 INJECTION, SOLUTION SUBCUTANEOUS at 21:31

## 2017-10-22 RX ADMIN — LABETALOL HYDROCHLORIDE 10 MG: 5 INJECTION, SOLUTION INTRAVENOUS at 12:32

## 2017-10-22 RX ADMIN — METOPROLOL TARTRATE 75 MG: 25 TABLET ORAL at 08:06

## 2017-10-22 RX ADMIN — HYDRALAZINE HYDROCHLORIDE 5 MG: 20 INJECTION INTRAMUSCULAR; INTRAVENOUS at 11:32

## 2017-10-22 RX ADMIN — GUANFACINE 1 MG: 1 TABLET ORAL at 14:53

## 2017-10-22 RX ADMIN — METOPROLOL TARTRATE 75 MG: 25 TABLET ORAL at 20:34

## 2017-10-22 NOTE — PLAN OF CARE
Problem: Patient Care Overview  Goal: Plan of Care/Patient Progress Review  Outcome: No Change  Patient is oriented to self only. Denied pain or nausea. VSS ex elevated /64. Voids in toilet, incontinent at times. Head wound staples intact.  Ax1 with walker. Regular diet, tolerating well. Plan: Ready to discharge once placement found at LTC facility, possibly [facility] (SW working on confirming insurance approval). Continue to monitor.

## 2017-10-22 NOTE — PLAN OF CARE
Problem: Patient Care Overview  Goal: Plan of Care/Patient Progress Review  Outcome: No Change  Oriented to self only.  Restless for most of shift, but easily re-directable. Denied pain or nausea. VSS ex elevated BP,  PRN labetalol and hydralazine given, started on Tenex.  Voids in toilet, incontinent at times. Head wound staples intact.  Ax1 with walker. Regular diet, tolerating well. Had large BM today. Traces of blood noted in stool, appears to be from around rectum. Pt c/o sore rectum, barrier cream applied. Plan: Ready to discharge once placement found at LTC facility,  Continue to monitor.

## 2017-10-22 NOTE — PLAN OF CARE
Problem: Patient Care Overview  Goal: Plan of Care/Patient Progress Review  Outcome: No Change    Shift Update: Patient is oriented to self only. Is pleasant and can easily be redirected. No complaints of pain on this shift. VSS ex B/P slightly elevated. Asymptomatic. Head wound staples intact. Voids in toilet. Incontinent of urine at times. Up with assist x 1 with walker. Tolerating regular diet well. Glucose corrected per SSI. Plan: Will D/C when placement found at LTC facility. Possible D/C to [facility] (SW working on confirming insurance approval).

## 2017-10-22 NOTE — PROGRESS NOTES
[facility]    Hospitalist Progress Note :     Assessment & Plan      Cumulative Summary: Amber Hull is a 72 year old female who was admitted on 10/12/2017 her PMH is significant for hypertension, paroxysmal afib and DM with suspected cognitive decline and inability to care for herself who was admitted under observation on 10/12/2017 for evaluation after a fall which resulted in a scalp laceration. Patient was admitted for further evaluation and management.she got sutures for the scalp laceration. She is currently living the friend Dayami but due to overall decline in patient status , she does not think that she can continue to help patient care.she was evaluated by psych and was found to have significant memory impairment and did very poor on her MMS, her neurocognitive deficits were consistent with Alzheimer's dementia ,  and  are following closely for disposition.  During her stay she was started back on her insulin and insulin is adjusted accordingly she is also started back on her antihypertensive medications she seemed to have elevated creatinine and her Ace inhibitors are stopped.  She was also started back on IV fluids and her creatinine is improved.  Her creatinine is improved and her antihypertensive medications are adjusted.     Fall and scalp laceration: un clear etiology of fall , She sustained a scalp laceration that was sutured in the ED. CT scan of her head was negative. This was likely a mechanical fall. Received Tdap vaccine in the ED. stples look clean this morning     -- continue local care , recommend to remove staples tomorrow morning, received the staples on October 12.  -- admitted to assist with placement as below     Worsening cognitive function and inability to care for self, suspect Alzheimer's dementia : Patient had been living with Dayami, a family friend. The patient does not have any relations.  She has had gradual decline in her  cognitive function suggestive of worsening dementia.  Dayami thinks that this is the best she has been and says she is worse at times in terms of her memory and cognitive issues.  Despite this the patient continues to drive and is not willing to surrender her driving license and stop driving.  Dayami does not think she will be able to continue to help care for Ms. Hull.   Patient was evaluated by psychiatry as per their recommendation patient does not need commitment and geriatric psych at this point.  Patient has a healthcare power of  named Ludmila Borges at this time recommending higher level of care like nursing home or TCU-from.  Patient is not agitated this morning, cooperative more stable on the current medications    -- psych consulted, noted to have scored poorly on the MMSE  --  prn Seroquel and prn Zyprexa for agitation , she required last dose of Olanzapine on October 19.  -- psych evaluated the patient , does not need to pursue commitment as she does have POA for health care  Ludmila Borges  -- likely needs long term care at discharge (memory care vs [facility] )  -- SW following to assist with arranging safe discharge plan     DM II, Uncontrolled: A1C 12.8 this stay. No known/reported complications from diabetes.Patient was supposed ot have been taking Lantus 30U daily but hasn't been taking it for some time now.  on presentation.  Now blood sugars are well controlled on 35 units of Lantus at nighttime.     -- Cont Lantus 35U HS  -- Medium dose sliding scale insulin.      Hypertension: Was supposed to have been taking lisinopril 20mg BID and metoprolol 75mg BID but had not been taking either of these, now bp is improved on current medications .  Her creatinine went up when she was started back on her ACE inhibitor now she is off the lisinopril and received some fluids.  Her creatinine is back in normal range.  Her baseline creatinine is around 1.4-1.6.   She continues to have high blood pressure  and this morning her systolic blood pressure was as high as over 200, she was given a dose of IV hydralazine.  paroxysmal atrial fibrillation: was prescribed Apixaban 5mg BID which she has not been taking. Note her CHADs2 VASc score is 4, indicating a 4% yearly stroke risk.    -- Continue patient on metoprolol 75 mg p.o. B.i.d.  -- off lisinopril, not able to tolerate ace inhibitors.  -- Norvasc to 10 mg p.o. daily.   -- Patient continues to have issues with high blood pressure, will not increase metoprolol further due to heart rate already in 60s, patient is not able to tolerate Ace inhibitors and diuretics due to decline in renal functions on them, will start patient on Tenex 1 mg p.o. Daily, once started on Tenex will not adjust any further blood pressure medication for the next 48 hours as it can drop the blood pressure significantly  -- Given her suspected underlying cognitive dysfunction and reported fall it is likely not safe to cont anticoagulation in the future, started her on full-dose aspirin  -- Depending upon her disposition plan, if patient is more in safe environment , might consider starting her back on Eliquis although she remains at high risk for fall      Suspected stage III CKD: Cr 1.42 on admission. No IVFs given. Last labs from 11/2016 showed Cr 1.6. Cr otherwise nl as of 4/2016 in Care Everywhere. they have creatinine is around 1.8.  Likely patient is unable to tolerate Ace inhibitors and the presence of her chronic kidney disease .    -- off Ace inhibitors   -- His creatinine is back to baseline, we'll start the IV fluids.    FEN: no IVFs, lytes stable, regular diet    DVT Prophylaxis: PCDs     Code Status: Full Code. Her friend Dayami does not believe she has a living will.      Disposition:  per psychiatry patient does not need geriatric psych at this point.  Probably needs higher level of care, social workers have placed referrals to TCU and NH.    More supervised environment might be  helpful for her medication intake from her hypertension and diabetes mellitus point of view also.     Appreciate help of psych, SW and CC , tentative discharge when arrangements are made , she is medically stable to be discharged when bed is available.    Inessa Barton MD, FACP  Text Page (7am - 6pm)      Interval History     She was seen and examined, feeling well, denying any complaint, very cooperative and pleasantly confused, following commands, I reviewed her medication changes with her.  She is denying any chest pain or shortness of breath or palpitations.    -Data reviewed today: I reviewed all new labs and imaging results over the last 24 hours.  I personally reviewed no images or EKG's today.    Physical Exam   Temp: 96.9  F (36.1  C) Temp src: Oral BP: 189/90 Pulse: 61 Heart Rate: 58 Resp: 16 SpO2: 95 % O2 Device: None (Room air)    Vitals:    10/12/17 1835 10/12/17 2333   Weight: 90.7 kg (200 lb) 94.1 kg (207 lb 6.4 oz)     Vital Signs with Ranges  Temp:  [96.9  F (36.1  C)-98.6  F (37  C)] 96.9  F (36.1  C)  Pulse:  [55-61] 61  Heart Rate:  [51-62] 58  Resp:  [16] 16  BP: (148-189)/(56-90) 189/90  SpO2:  [93 %-99 %] 95 %  I/O last 3 completed shifts:  In: -   Out: 200 [Urine:200]    GENERAL: Alert , awake , pleasantly confused , oriented to person  NAD. Conversational, appropriate.  HEENT: Normocephalic. EOMI. No icterus or injection. Nares normal. Staples present on the scalp , healing well.  LUNGS: Clear to auscultation. No dyspnea at rest.   HEART: Regular rate. Extremities perfused.   ABDOMEN: Soft, nontender, and nondistended. Positive bowel sounds.   EXTREMITIES: No LE edema noted.   NEUROLOGIC: Moves extremities x4 on command. No acute focal neurologic abnormalities noted.     Medications        guanFACINE  1 mg Oral At Bedtime     amLODIPine  10 mg Oral Daily     aspirin EC  325 mg Oral Daily     insulin aspart  1-7 Units Subcutaneous TID AC     insulin aspart  1-5 Units Subcutaneous At Bedtime      insulin glargine  35 Units Subcutaneous At Bedtime     metoprolol  75 mg Oral BID       Data     Recent Labs  Lab 10/22/17  0741 10/21/17  0647 10/20/17  0730   WBC 5.5  --   --    HGB 13.0  --   --    MCV 90  --   --      --   --     141 145*   POTASSIUM 5.1 4.8 4.8   CHLORIDE 113* 113* 115*   CO2 23 22 24   BUN 34* 41* 49*   CR 1.31* 1.40* 1.65*   ANIONGAP 8 6 6   IDANIA 8.9 8.7 8.2*   * 203* 125*       Imaging:   No results found for this or any previous visit (from the past 24 hour(s)).

## 2017-10-23 LAB
GLUCOSE BLDC GLUCOMTR-MCNC: 164 MG/DL (ref 70–99)
GLUCOSE BLDC GLUCOMTR-MCNC: 177 MG/DL (ref 70–99)
GLUCOSE BLDC GLUCOMTR-MCNC: 215 MG/DL (ref 70–99)
GLUCOSE BLDC GLUCOMTR-MCNC: 239 MG/DL (ref 70–99)
GLUCOSE BLDC GLUCOMTR-MCNC: 266 MG/DL (ref 70–99)

## 2017-10-23 PROCEDURE — 25000132 ZZH RX MED GY IP 250 OP 250 PS 637: Performed by: INTERNAL MEDICINE

## 2017-10-23 PROCEDURE — 96372 THER/PROPH/DIAG INJ SC/IM: CPT

## 2017-10-23 PROCEDURE — G0378 HOSPITAL OBSERVATION PER HR: HCPCS

## 2017-10-23 PROCEDURE — 25000132 ZZH RX MED GY IP 250 OP 250 PS 637: Performed by: HOSPITALIST

## 2017-10-23 PROCEDURE — 25000131 ZZH RX MED GY IP 250 OP 636 PS 637: Performed by: INTERNAL MEDICINE

## 2017-10-23 PROCEDURE — A9270 NON-COVERED ITEM OR SERVICE: HCPCS | Mod: GY | Performed by: INTERNAL MEDICINE

## 2017-10-23 PROCEDURE — A9270 NON-COVERED ITEM OR SERVICE: HCPCS | Mod: GY | Performed by: HOSPITALIST

## 2017-10-23 PROCEDURE — 00000146 ZZHCL STATISTIC GLUCOSE BY METER IP

## 2017-10-23 PROCEDURE — 99225 ZZC SUBSEQUENT OBSERVATION CARE,LEVEL II: CPT | Performed by: INTERNAL MEDICINE

## 2017-10-23 PROCEDURE — 99207 ZZC CDG-CODE CATEGORY CHANGED: CPT | Performed by: INTERNAL MEDICINE

## 2017-10-23 RX ADMIN — ASPIRIN 325 MG: 325 TABLET, DELAYED RELEASE ORAL at 08:10

## 2017-10-23 RX ADMIN — METOPROLOL TARTRATE 75 MG: 25 TABLET ORAL at 19:58

## 2017-10-23 RX ADMIN — AMLODIPINE BESYLATE 10 MG: 10 TABLET ORAL at 08:10

## 2017-10-23 RX ADMIN — GUANFACINE 1 MG: 1 TABLET ORAL at 08:10

## 2017-10-23 RX ADMIN — INSULIN GLARGINE 35 UNITS: 100 INJECTION, SOLUTION SUBCUTANEOUS at 21:52

## 2017-10-23 NOTE — PLAN OF CARE
Problem: Patient Care Overview  Goal: Plan of Care/Patient Progress Review  Outcome: No Change  VSS. Disoriented to time and situation. Intermittent confusion. Cooperative today but wants to discharge. Awaiting placement. No other complaints. Antifungal applied to groin and under breasts. Staples and sutures to head laceration removed. Up with SBA in room. D/c plan pending placement. Continue to monitor.

## 2017-10-23 NOTE — PROGRESS NOTES
LEXI  D:  SWS following pt for placement purposes.  Pt is medically ready for discharge.  I:  DAMIAN spoke to Sanjuana handling admissions at [facility].  Per Sanjuana, [facility] no longer has memory care bed available for pt.  [facility], [place], and [facility] have no available memory care bed for pt.  Pt's health care JOANN Keys does not want pt going to [facility] as they have had poor reviews noted online.  DAMIAN contacted [facility] and spoke to Misti, [phone], call reference #[id number].  Misti reports the following facilities are in network for pt: [facility], [facility], [facility], ([facility]) and [facility]- referrals sent by DOD process.  P:  SW following pt for memory care placement.      Update:  Pt declined at [facility] as they do not believe they are able to meet her needs and no bed.  Per Sanjuana at [facility], [facility] denied prior authorization for TCU stay for pt.  DAMIAN asked Milly at [facility] to put pt name on wait list for memory care LTC unit; she will do this.  [facility] wait list already has 5-6 names on it before pt's name.      End of day update at 1710:  DAMIAN received a call from Josiane at [facility] ([phone]).  Per Josiane, pt has no need for SNF services.  Pt needs penitentiary care.  SNF coverage denied.  DAMIAN spoke to friend Dayami.  She will contact an /attorneys tomorrow about possibility of still pursuing guardianship/conservatorship of pt and call SW with an update.  Pt is on wait list at [facility] memory care LTC unit.  Manager of Care Transitions, Ewa, is aware of pt case and circumstances.  DAMIAN left message for Alejo in [place] to see if they have locked unit/memory care for pt.

## 2017-10-23 NOTE — PLAN OF CARE
Problem: Patient Care Overview  Goal: Plan of Care/Patient Progress Review  Outcome: No Change  Patient confused, oriented to self only. Pleasant on shift, no agitation noted; easily redirectable on shift. Dissociative/illogical speech at times. No c/o pain. VSS. Ambulated to bathroom on shift with one assist and walker; continent of urine. Midline scalp laceration, closed with sutures/staples to removed 10/23. Patient sets off bed alarm frequently; does not call appropriately. Checking patient often t/o the night. Continue to monitor.

## 2017-10-23 NOTE — PLAN OF CARE
Problem: Patient Care Overview  Goal: Plan of Care/Patient Progress Review  Oriented to self only. Can get agitated, but easily re-directable. Denied pain or nausea. Elevated BPs, other VSS. Incontinent of urine at times. Head wound staples intact, to be removed tomorrow morning, per MD notes. SBA, does not call appropriately. Frequently sets off alarms. Regular diet, tolerating well. Adamant about leaving tomorrow. Plan: Ready to discharge once placement found at LTC facility. Family is concerned about placement. Continue to monitor.

## 2017-10-23 NOTE — PROGRESS NOTES
[facility]    Hospitalist Progress Note    Date of Service (when I saw the patient): 10/23/2017    Assessment & Plan   Amber Hull is a 72 year old female who was admitted on 10/12/2017.      Fall and scalp laceration  This was likely a mechanical fall. She sustained a scalp laceration that was sutured in the ED. CT scan of her head was negative.  Received Tdap vaccine in the ED.  - staples and suture removed by RN this morning  - continue local care       Worsening cognitive function and inability to care for self, suspect Alzheimer's dementia   Patient had been living with Dayami, a family friend. The patient does not have any relations. She has had gradual decline in her cognitive function suggestive of worsening dementia. Dayami thinks that this is the best she has been and says she is worse at times in terms of her memory and cognitive issues.  Despite this the patient continues to drive and is not willing to surrender her driving license and stop driving. Dayami does not think she will be able to continue to help care for Ms. Hull. Noted to have scored poorly on the MMSE. Patient was evaluated by psychiatry as per their recommendation patient does not need commitment nor geriatric psych at this point. Per psych, does not need to pursue commitment as she does have POA for health care Ludmila Borges. She required Olanzapine on October 19.  - SW following, recommending higher level of care like nursing home or TCU   - prn Seroquel and prn Zyprexa for agitation ,       DM II, Uncontrolled: A1C 12.8 this stay. No known/reported complications from diabetes.Patient was supposed ot have been taking Lantus 30U daily but hasn't been taking it for some time now.  on presentation.    - Cont Lantus 35U HS  - Medium dose sliding scale insulin.  - chemstrips remain above goal today, Mod carb diet ordered 10/23       Hypertension  Paroxysmal atrial fibrillation   Was supposed to have been taking  lisinopril 20mg BID and metoprolol 75mg BID but had not been taking either of these, now bp is improved on current medications. Was prescribed Apixaban 5mg BID which she has not been taking. Note her CHADs2 VASc score is 4, indicating a 4% yearly stroke risk.  - prn IV hydralazine.   - PTA metoprolol 75 mg p.o. B.i.d.  - Norvasc 10 mg p.o. daily.   - c/w Tenex 1 mg p.o. Daily, once started on Tenex will not adjust any further blood pressure medication for the next 48 hours as it can drop the blood pressure significantly  - Given her suspected underlying cognitive dysfunction and reported fall it is likely not safe to cont anticoagulation in the future, c/w full-dose aspirin  - Depending upon her disposition plan, if patient is more in safe environment, may consider starting her back on Eliquis although she remains at high risk for fall       Suspected stage III CKD:   Cr 1.42 on admission. Her baseline creatinine is around 1.4-1.6. Her creatinine went up when she was started back on her ACE inhibitor now she is off the lisinopril and received some fluids.  Her creatinine is back in normal range.   Likely patient is unable to tolerate Ace inhibitors and the presence of her chronic kidney disease .   - off Ace inhibitors   - monitor    DVT Prophylaxis: Pneumatic Compression Devices  Code Status: Full Code    Disposition: Expected discharge in 1 day once placement secured. See SW notes.    Reyna Cordero MD    Interval History   No new complaints. Wondering when she will be able to be discharged. Verbalizes having issues with her family in the past.     -Data reviewed today: I reviewed all new labs and imaging results over the last 24 hours. I personally reviewed no images or EKG's today.    Physical Exam   Temp: 97.2  F (36.2  C) Temp src: Oral BP: 132/85 Pulse: 68 Heart Rate: 69 Resp: 18 SpO2: 97 % O2 Device: None (Room air)    Vitals:    10/12/17 1835 10/12/17 2333   Weight: 90.7 kg (200 lb) 94.1 kg (207 lb 6.4  oz)     Vital Signs with Ranges  Temp:  [97.2  F (36.2  C)-98.4  F (36.9  C)] 97.2  F (36.2  C)  Pulse:  [68] 68  Heart Rate:  [57-69] 69  Resp:  [16-18] 18  BP: (121-167)/(56-89) 132/85  SpO2:  [92 %-97 %] 97 %  I/O last 3 completed shifts:  In: 240 [P.O.:240]  Out: 100 [Urine:100]    Constitutional: Awake, alert, cooperative, no apparent distress  Respiratory: Clear to auscultation bilaterally, no crackles or wheezing  Cardiovascular: Regular rate and rhythm, normal S1 and S2, and no murmur noted  GI: Normal bowel sounds, soft, non-distended, non-tender  Skin/Integumen: No rashes, no cyanosis, no edema  Other: Staples on scalp, no drainage    Medications        guanFACINE  1 mg Oral Daily     amLODIPine  10 mg Oral Daily     aspirin EC  325 mg Oral Daily     insulin aspart  1-7 Units Subcutaneous TID AC     insulin aspart  1-5 Units Subcutaneous At Bedtime     insulin glargine  35 Units Subcutaneous At Bedtime     metoprolol  75 mg Oral BID       Data     Recent Labs  Lab 10/22/17  0741 10/21/17  0647 10/20/17  0730   WBC 5.5  --   --    HGB 13.0  --   --    MCV 90  --   --      --   --     141 145*   POTASSIUM 5.1 4.8 4.8   CHLORIDE 113* 113* 115*   CO2 23 22 24   BUN 34* 41* 49*   CR 1.31* 1.40* 1.65*   ANIONGAP 8 6 6   IDANIA 8.9 8.7 8.2*   * 203* 125*       No results found for this or any previous visit (from the past 24 hour(s)).

## 2017-10-24 LAB
GLUCOSE BLDC GLUCOMTR-MCNC: 131 MG/DL (ref 70–99)
GLUCOSE BLDC GLUCOMTR-MCNC: 144 MG/DL (ref 70–99)
GLUCOSE BLDC GLUCOMTR-MCNC: 154 MG/DL (ref 70–99)
GLUCOSE BLDC GLUCOMTR-MCNC: 242 MG/DL (ref 70–99)
GLUCOSE BLDC GLUCOMTR-MCNC: 248 MG/DL (ref 70–99)

## 2017-10-24 PROCEDURE — 99225 ZZC SUBSEQUENT OBSERVATION CARE,LEVEL II: CPT | Performed by: INTERNAL MEDICINE

## 2017-10-24 PROCEDURE — 25000132 ZZH RX MED GY IP 250 OP 250 PS 637: Performed by: INTERNAL MEDICINE

## 2017-10-24 PROCEDURE — A9270 NON-COVERED ITEM OR SERVICE: HCPCS | Mod: GY | Performed by: INTERNAL MEDICINE

## 2017-10-24 PROCEDURE — A9270 NON-COVERED ITEM OR SERVICE: HCPCS | Mod: GY | Performed by: HOSPITALIST

## 2017-10-24 PROCEDURE — 96372 THER/PROPH/DIAG INJ SC/IM: CPT

## 2017-10-24 PROCEDURE — 25000131 ZZH RX MED GY IP 250 OP 636 PS 637: Performed by: INTERNAL MEDICINE

## 2017-10-24 PROCEDURE — G0378 HOSPITAL OBSERVATION PER HR: HCPCS

## 2017-10-24 PROCEDURE — 25000132 ZZH RX MED GY IP 250 OP 250 PS 637: Performed by: HOSPITALIST

## 2017-10-24 PROCEDURE — 00000146 ZZHCL STATISTIC GLUCOSE BY METER IP

## 2017-10-24 PROCEDURE — A9270 NON-COVERED ITEM OR SERVICE: HCPCS | Mod: GY | Performed by: PSYCHIATRY & NEUROLOGY

## 2017-10-24 PROCEDURE — 99207 ZZC CDG-CODE CATEGORY CHANGED: CPT | Performed by: INTERNAL MEDICINE

## 2017-10-24 PROCEDURE — 25000132 ZZH RX MED GY IP 250 OP 250 PS 637: Performed by: PSYCHIATRY & NEUROLOGY

## 2017-10-24 RX ORDER — LIDOCAINE 40 MG/G
CREAM TOPICAL
Status: DISCONTINUED | OUTPATIENT
Start: 2017-10-24 | End: 2017-10-29

## 2017-10-24 RX ADMIN — ASPIRIN 325 MG: 325 TABLET, DELAYED RELEASE ORAL at 08:53

## 2017-10-24 RX ADMIN — MICONAZOLE NITRATE: 2 POWDER TOPICAL at 09:02

## 2017-10-24 RX ADMIN — GUANFACINE 1 MG: 1 TABLET ORAL at 08:53

## 2017-10-24 RX ADMIN — METOPROLOL TARTRATE 75 MG: 25 TABLET ORAL at 21:53

## 2017-10-24 RX ADMIN — QUETIAPINE FUMARATE 50 MG: 25 TABLET, FILM COATED ORAL at 08:53

## 2017-10-24 RX ADMIN — INSULIN GLARGINE 35 UNITS: 100 INJECTION, SOLUTION SUBCUTANEOUS at 21:53

## 2017-10-24 RX ADMIN — AMLODIPINE BESYLATE 10 MG: 10 TABLET ORAL at 08:53

## 2017-10-24 NOTE — PROGRESS NOTES
"SWS PROGRESS NOTE:     I: Pt has barriers to DC including [employer] declining authorization for TCU. Pt has a friend who is her POA for healthcare decisions but she does not have a legally appointed financial POA or conservator. Pt's friend Dayami has agreed to assist pt in the financial arena and has been in contact with [employer] to discuss process re: applying for conservatorship. Pt has no known assets; she does have social security income which could be turned over to the accepting facility immediately. Pt will need to apply for Medical Assistance. In order to apply, pt will need to have someone who can access her financial accounts. At this time, referrals have been sent to multiple facilities. Pt requires LTC placement at this time and will require LTC placement moving forward. Pt's POA and friends agree that pt will need to be LTC. SW working on placement for pt. Pt will be MA pending while her finances are gathered. SW will continue to work on placement for pt. At this time, pending referrals include: [facility], [facility], [facility], [facility]. In an attempt to establish a list of appropriate facilities for LTC, [facility] was contacted but they do not have an appropriate bed for pt (per Radha). Marv from [facility] is also following pt's case. He can be reached at [phone] and would like an update on DC plans.     P: SW following for LTC placement.     Coreen Graff, MSW, LGSW [phone]    UPDATE 1045:  DAMIAN spoke with Epi Butler in admissions at [facility]. Epi Butler said that clinically they could accept pt and they have open beds but, at this time, pt has no valid payer source and would need to have MA approved or at least a conservator in place for them to accept pt. Epi Butler reports that Dayami's mother, Tasia, is currently at [facility]. Tasia and pt mendoza reportedly \"very close friends.\" SW will f/u with [facility] once pt's financial situation is secured.   Traci from [facility] has declined pt stating that they do not have an appropriate bed for pt at this time.   DAMIAN spoke with Vernon at [facility] who will review referral and call SWS back with decision/bed availability.     UPDATE 1615:  DAMIAN spoke with Dayami, pt's friend, who spoke with [facility] staff re: conservatorship. Dayami was given information on how to apply for conservator and she has begun this process. Dayami plans to speak with Epi Butler at [facility] about what needs to be completed in order for them to accept pt. Dayami plans to discuss options with Epi Butler and hopes that pt will be admitted once the application for conservatorship has been submitted tot [facility] instead of having to wait until the process is complete/approved. DAMIAN asked Dayami to call with additional questions; she agreed. Dayami is going to be working with an  during this process which she hopes will expedite the process as well.

## 2017-10-24 NOTE — PROGRESS NOTES
[facility]    Hospitalist Progress Note    Date of Service (when I saw the patient): 10/24/2017    Assessment & Plan   Amber Hull is a 72 year old female who was admitted on 10/12/2017.      Fall and scalp laceration  This was likely a mechanical fall. She sustained a scalp laceration that was sutured in the ED. CT scan of her head was negative.  Received Tdap vaccine in the ED. Staples and suture removed by RN 10/23  - continue local care       Worsening cognitive function and inability to care for self, suspect Alzheimer's dementia   Patient had been living with Dayami, a family friend. The patient does not have any relations. She has had gradual decline in her cognitive function suggestive of worsening dementia. Dayami thinks that this is the best she has been and says she is worse at times in terms of her memory and cognitive issues.  Despite this the patient continues to drive and is not willing to surrender her driving license and stop driving. Dayami does not think she will be able to continue to help care for Ms. Hull. Noted to have scored poorly on the MMSE. Patient was evaluated by psychiatry as per their recommendation patient does not need commitment nor geriatric psych at this point. Per psych, does not need to pursue commitment as she does have POA for health care Ludmila Borges. She required Olanzapine on October 19.  - SW following, recommending higher level of care like nursing home or TCU   - prn Seroquel and prn Zyprexa for agitation      DM II, Uncontrolled: A1C 12.8 this stay. No known/reported complications from diabetes.Patient was supposed ot have been taking Lantus 30U daily but hasn't been taking it for some time now.  on presentation.    - Mod carb diet ordered 10/23   - Cont Lantus 35U HS as AM blood sugars OK  - start Premeal Novolog 3 units TID  - Medium dose sliding scale insulin.      Hypertension  Paroxysmal atrial fibrillation  Was supposed to have been  taking lisinopril 20mg BID and metoprolol 75mg BID but had not been taking either of these, now bp is improved on current medications. Was prescribed Apixaban 5mg BID which she has not been taking. Note her CHADs2 VASc score is 4, indicating a 4% yearly stroke risk.  - prn IV hydralazine.   - PTA metoprolol 75 mg p.o. B.i.d.  - Norvasc 10 mg p.o. daily.   - c/w Tenex 1 mg p.o. Daily  - c/w full-dose aspirin only given fall risk  - Depending upon her disposition plan, if patient is more in safe environment, may consider starting her back on Eliquis although she remains at high risk for fall       Suspected stage III CKD:   Cr 1.42 on admission. Her baseline creatinine is around 1.4-1.6. Her creatinine went up when she was started back on her ACE inhibitor now she is off the lisinopril and received some fluids.  Her creatinine is back in normal range.   Likely patient is unable to tolerate Ace inhibitors and the presence of her chronic kidney disease .   - off Ace inhibitors   - monitor  - per RN pt not drinking free water but appetite OK, bladder scan only with 44ml   - encourage free water intake, recheck BMP in AM    DVT Prophylaxis: Pneumatic Compression Devices  Code Status: Full Code    Disposition: Expected discharge day to day days once facility accepts.    Reyna Cordero MD    Interval History   No new complaints. Up in chair. Asking when she will be discharged. Told her simply I do not know but we need to make a safe discharge plan for her, at a rehab facility. Patient was grateful for my honesty and states will comply with current care plan.Tolerating diet, ambulating hallways.    -Data reviewed today: I reviewed all new labs and imaging results over the last 24 hours. I personally reviewed no images or EKG's today.    Physical Exam   Temp: 95.5  F (35.3  C) Temp src: Oral BP: 121/67 Pulse: 63 Heart Rate: 63 Resp: 16 SpO2: 97 % O2 Device: None (Room air)    Vitals:    10/12/17 1835 10/12/17 2333    Weight: 90.7 kg (200 lb) 94.1 kg (207 lb 6.4 oz)     Vital Signs with Ranges  Temp:  [95.5  F (35.3  C)-97.7  F (36.5  C)] 95.5  F (35.3  C)  Pulse:  [63] 63  Heart Rate:  [54-63] 63  Resp:  [16] 16  BP: (121-167)/(57-84) 121/67  SpO2:  [93 %-98 %] 97 %  I/O last 3 completed shifts:  In: 660 [P.O.:660]  Out: -     Constitutional: Awake, alert, cooperative, no apparent distress  Respiratory: Clear to auscultation bilaterally, no crackles or wheezing  Cardiovascular: Regular rate and rhythm, normal S1 and S2, and no murmur noted  GI: Normal bowel sounds, soft, non-distended, non-tender  Skin/Integumen: No rashes, no cyanosis, no edema    Medications        sodium chloride (PF)  3 mL Intracatheter Q8H     insulin aspart  3 Units Subcutaneous TID w/meals     guanFACINE  1 mg Oral Daily     amLODIPine  10 mg Oral Daily     aspirin EC  325 mg Oral Daily     insulin aspart  1-7 Units Subcutaneous TID AC     insulin aspart  1-5 Units Subcutaneous At Bedtime     insulin glargine  35 Units Subcutaneous At Bedtime     metoprolol  75 mg Oral BID       Data     Recent Labs  Lab 10/22/17  0741 10/21/17  0647 10/20/17  0730   WBC 5.5  --   --    HGB 13.0  --   --    MCV 90  --   --      --   --     141 145*   POTASSIUM 5.1 4.8 4.8   CHLORIDE 113* 113* 115*   CO2 23 22 24   BUN 34* 41* 49*   CR 1.31* 1.40* 1.65*   ANIONGAP 8 6 6   IDANIA 8.9 8.7 8.2*   * 203* 125*       No results found for this or any previous visit (from the past 24 hour(s)).

## 2017-10-24 NOTE — PLAN OF CARE
Problem: Patient Care Overview  Goal: Plan of Care/Patient Progress Review  Outcome: Improving  VSS. Disoriented to time and situation. Intermittent confusion. Cooperative. Antifungal applied to reddened groin and under breasts. Head lac open to air, healing. Up with SBA in room, does not use call light, sets alarms off. D/c plan pending placement. Continue to monitor.

## 2017-10-24 NOTE — PLAN OF CARE
Problem: Confusion, Chronic (Adult)  Goal: Cognitive/Functional Impairments Minimized  Patient will demonstrate the desired outcomes by discharge/transition of care.   Outcome: No Change  Disoriented to time and situation. Wants to discharge, distraction used. Given seroquel this AM as patient was anxious about leaving hospital to go get belongings. No other complaints. Able to redirect. Antifungal applied to groin. No voiding today; bladder scan showed only 44mL; will encourage po intake, MD aware. Up to chair for meals. Plan pending placement. Continue to monitor.

## 2017-10-24 NOTE — PLAN OF CARE
Problem: Patient Care Overview  Goal: Plan of Care/Patient Progress Review  Outcome: No Change  Patient confused. Oriented to self only, knew she was at the hospital. No c/o pain. VSS except a little bradycardic at 54-57. Large midline scalp laceration, intact. NATE, sutures/staples removed 10/23. Powder applied to groin area on shift. Incontinent of urine and stool. Reddened groin area. Had loose, watery BM 10/24. L PIV SL. SBA, ambulated to bathroom on shift. Does not use call light appropriately. Slept in chair for some time on shift. D/c pending placement.

## 2017-10-25 LAB
ANION GAP SERPL CALCULATED.3IONS-SCNC: 7 MMOL/L (ref 3–14)
BUN SERPL-MCNC: 63 MG/DL (ref 7–30)
CALCIUM SERPL-MCNC: 8.6 MG/DL (ref 8.5–10.1)
CHLORIDE SERPL-SCNC: 110 MMOL/L (ref 94–109)
CO2 SERPL-SCNC: 23 MMOL/L (ref 20–32)
CREAT SERPL-MCNC: 1.79 MG/DL (ref 0.52–1.04)
GFR SERPL CREATININE-BSD FRML MDRD: 28 ML/MIN/1.7M2
GLUCOSE BLDC GLUCOMTR-MCNC: 118 MG/DL (ref 70–99)
GLUCOSE BLDC GLUCOMTR-MCNC: 179 MG/DL (ref 70–99)
GLUCOSE BLDC GLUCOMTR-MCNC: 198 MG/DL (ref 70–99)
GLUCOSE BLDC GLUCOMTR-MCNC: 199 MG/DL (ref 70–99)
GLUCOSE BLDC GLUCOMTR-MCNC: 233 MG/DL (ref 70–99)
GLUCOSE SERPL-MCNC: 148 MG/DL (ref 70–99)
POTASSIUM SERPL-SCNC: 5.1 MMOL/L (ref 3.4–5.3)
SODIUM SERPL-SCNC: 140 MMOL/L (ref 133–144)

## 2017-10-25 PROCEDURE — A9270 NON-COVERED ITEM OR SERVICE: HCPCS | Mod: GY | Performed by: INTERNAL MEDICINE

## 2017-10-25 PROCEDURE — 25000132 ZZH RX MED GY IP 250 OP 250 PS 637: Mod: GY | Performed by: INTERNAL MEDICINE

## 2017-10-25 PROCEDURE — G0378 HOSPITAL OBSERVATION PER HR: HCPCS

## 2017-10-25 PROCEDURE — 96372 THER/PROPH/DIAG INJ SC/IM: CPT

## 2017-10-25 PROCEDURE — 25000132 ZZH RX MED GY IP 250 OP 250 PS 637: Mod: GY | Performed by: HOSPITALIST

## 2017-10-25 PROCEDURE — 36415 COLL VENOUS BLD VENIPUNCTURE: CPT | Performed by: INTERNAL MEDICINE

## 2017-10-25 PROCEDURE — 99207 ZZC CDG-CODE CATEGORY CHANGED: CPT | Performed by: INTERNAL MEDICINE

## 2017-10-25 PROCEDURE — 00000146 ZZHCL STATISTIC GLUCOSE BY METER IP

## 2017-10-25 PROCEDURE — 80048 BASIC METABOLIC PNL TOTAL CA: CPT | Performed by: INTERNAL MEDICINE

## 2017-10-25 PROCEDURE — A9270 NON-COVERED ITEM OR SERVICE: HCPCS | Mod: GY | Performed by: HOSPITALIST

## 2017-10-25 PROCEDURE — 99225 ZZC SUBSEQUENT OBSERVATION CARE,LEVEL II: CPT | Performed by: INTERNAL MEDICINE

## 2017-10-25 PROCEDURE — 25000131 ZZH RX MED GY IP 250 OP 636 PS 637: Performed by: INTERNAL MEDICINE

## 2017-10-25 RX ADMIN — ASPIRIN 325 MG: 325 TABLET, DELAYED RELEASE ORAL at 08:00

## 2017-10-25 RX ADMIN — INSULIN GLARGINE 35 UNITS: 100 INJECTION, SOLUTION SUBCUTANEOUS at 21:10

## 2017-10-25 RX ADMIN — METOPROLOL TARTRATE 75 MG: 25 TABLET ORAL at 21:10

## 2017-10-25 RX ADMIN — AMLODIPINE BESYLATE 10 MG: 10 TABLET ORAL at 08:00

## 2017-10-25 RX ADMIN — GUANFACINE 1 MG: 1 TABLET ORAL at 08:00

## 2017-10-25 NOTE — PROGRESS NOTES
BRIEF NUTRITION REASSESSMENT      REASON FOR REASSESSMENT:  LOS    CURRENT DIET AND INTAKE:  Diet:  Moderate CHO            Room Service with Assist              Chart reviewed  Notes plans in progress for LTC placement  Pt has been ordering 3 full meals per day  Flowsheets reflect intake of 100%    ANTHROPOMETRICS:  Vitals:    10/12/17 1835 10/12/17 2333   Weight: 90.7 kg (200 lb) 94.1 kg (207 lb 6.4 oz)         LABS:  Labs noted        NUTRITION INTERVENTION:  Nutrition Diagnosis:  No nutrition diagnosis at this time.    Implementation:  Room Service with Assist - pt to be seen daily for meal ordering assistance     FOLLOW UP/MONITORING:   Will re-evaluate in 7 - 10 days, or sooner, if re-consulted.

## 2017-10-25 NOTE — PROGRESS NOTES
[facility]    Hospitalist Progress Note    Date of Service (when I saw the patient): 10/25/2017    Assessment & Plan   Amber Hull is a 72 year old female who was admitted on 10/12/2017.      Fall and scalp laceration  This was likely a mechanical fall. She sustained a scalp laceration that was sutured in the ED. CT scan of her head was negative.  Received Tdap vaccine in the ED. Staples and suture removed by RN 10/23  - continue local care       Worsening cognitive function and inability to care for self, suspect Alzheimer's dementia   Patient had been living with Dayami, a family friend. The patient does not have any relations. She has had gradual decline in her cognitive function suggestive of worsening dementia. Dayami thinks that this is the best she has been and says she is worse at times in terms of her memory and cognitive issues.  Despite this the patient continues to drive and is not willing to surrender her driving license and stop driving. Dayami does not think she will be able to continue to help care for Ms. Hull. Noted to have scored poorly on the MMSE. Patient was evaluated by psychiatry as per their recommendation patient does not need commitment nor geriatric psych at this point. Per psych, does not need to pursue commitment as she does have POA for health care Ludmila Borges. She required Olanzapine on October 19.  - SW following, recommending higher level of care like nursing home or TCU   - prn Seroquel and prn Zyprexa for agitation      DM II, Uncontrolled: A1C 12.8 this stay. No known/reported complications from diabetes.Patient was supposed ot have been taking Lantus 30U daily but hasn't been taking it for some time now.  on presentation.    - Mod carb diet ordered 10/23   - Cont Lantus 35U HS as AM blood sugars OK  - started Premeal Novolog 3 units TID on 10/25  - Medium SSI.      Hypertension  Paroxysmal atrial fibrillation  Was supposed to have been taking  lisinopril 20mg BID and metoprolol 75mg BID but had not been taking either of these, now bp is improved on current medications. Was prescribed Apixaban 5mg BID which she has not been taking. Note her CHADs2 VASc score is 4, indicating a 4% yearly stroke risk.  - BP stable, c/w metoprolol 75 mg p.o. B.i.d., Norvasc 10 mg p.o. Daily, and Tenex 1 mg p.o. Daily  - c/w full-dose aspirin only given fall risk  - prn IV hydralazine.   - Depending upon her disposition plan, if patient is more in safe environment, may consider starting her back on Eliquis although she remains at high risk for fall       Suspected stage III CKD:   Cr 1.42 on admission. Her baseline creatinine is around 1.4-1.6. Her creatinine went up when she was started back on her ACE inhibitor now she is off the lisinopril and received some fluids.  Her creatinine is back in normal range.   Likely patient is unable to tolerate Ace inhibitors and the presence of her chronic kidney disease .   - Cr 1.31 to 1.79 on 10/25  - per staff pt eating well but not hydrating; given elevated BUN to Cr ratio will encourage po free water intake  - BMP in AM, consider IVF bolus if renal function declines    DVT Prophylaxis: Pneumatic Compression Devices  Code Status: Full Code    Disposition: Expected discharge day to day pending placement.    Reyna Cordero MD    Interval History   No new complaints. States eating and ambulating well. No drainage from scalp. Asking when she will be discharged.    -Data reviewed today: I reviewed all new labs and imaging results over the last 24 hours. I personally reviewed no images or EKG's today.    Physical Exam   Temp: 96.5  F (35.8  C) Temp src: Oral BP: 134/69   Heart Rate: 61 Resp: 16 SpO2: 96 % O2 Device: None (Room air)    Vitals:    10/12/17 1835 10/12/17 2333   Weight: 90.7 kg (200 lb) 94.1 kg (207 lb 6.4 oz)     Vital Signs with Ranges  Temp:  [95.4  F (35.2  C)-97.3  F (36.3  C)] 96.5  F (35.8  C)  Heart Rate:  [56-71]  61  Resp:  [16-18] 16  BP: (120-164)/(53-69) 134/69  SpO2:  [95 %-97 %] 96 %  I/O last 3 completed shifts:  In: 840 [P.O.:840]  Out: -     Constitutional: Awake, alert, cooperative, no apparent distress  Respiratory: Clear to auscultation bilaterally, no crackles or wheezing  Cardiovascular: Regular rate and rhythm, normal S1 and S2, and no murmur noted  GI: Normal bowel sounds, soft, non-distended, non-tender  Skin/Integumen: No rashes, no cyanosis, no edema  Other: Oriented to person and place. Follows commands. Re-directable.    Medications        sodium chloride (PF)  3 mL Intracatheter Q8H     insulin aspart  3 Units Subcutaneous TID w/meals     guanFACINE  1 mg Oral Daily     amLODIPine  10 mg Oral Daily     aspirin EC  325 mg Oral Daily     insulin aspart  1-7 Units Subcutaneous TID AC     insulin aspart  1-5 Units Subcutaneous At Bedtime     insulin glargine  35 Units Subcutaneous At Bedtime     metoprolol  75 mg Oral BID       Data     Recent Labs  Lab 10/25/17  0650 10/22/17  0741 10/21/17  0647   WBC  --  5.5  --    HGB  --  13.0  --    MCV  --  90  --    PLT  --  239  --     144 141   POTASSIUM 5.1 5.1 4.8   CHLORIDE 110* 113* 113*   CO2 23 23 22   BUN 63* 34* 41*   CR 1.79* 1.31* 1.40*   ANIONGAP 7 8 6   IDANIA 8.6 8.9 8.7   * 155* 203*       No results found for this or any previous visit (from the past 24 hour(s)).

## 2017-10-25 NOTE — PLAN OF CARE
Problem: Patient Care Overview  Goal: Plan of Care/Patient Progress Review  Outcome: No Change  Pt is alert but disoriented to time and situation. VS stable, denied pain. Pt was easily agitated/irritable with cares when being woken up. Occasionally calls staff names, but still somewhat cooperative. BS @ 2am was 199. Mod carb diet. Pt does not use call light, bed alarm on. DC pending placement.

## 2017-10-25 NOTE — PLAN OF CARE
Problem: Confusion, Chronic (Adult)  Goal: Cognitive/Functional Impairments Minimized  Patient will demonstrate the desired outcomes by discharge/transition of care.   Outcome: No Change  Disoriented to time and situation. VSS.  Denies pain.  Asked where her car keys were x 1 then forgot about it.   No other complaints. Able to redirect. Encouraged PO fluid intake d/t poor output on day shift.  Patient on mod CHO diet tolerated well.  Has BGM before meals and at HS.  Ambulates SBA in room and with FWW in hallway.  Encouraged to ambulate in hallway this shift but refused.  DC pending placement.  Will continue to monitor.

## 2017-10-25 NOTE — PROGRESS NOTES
"DAMIAN  I: DAMIAN called [facility] Adult protection worker to update on patient. SW left message and awaits a call back.     P: SW will continue to follow and assist as needed.      ADDENDUM  I: DAMIAN spoke with Marv with [facility] to provide him with an update. DAMIAN discussed that at this point we are waiting on a payor source (Dayami applying for conservatorship). Marv agrees that HC Agent does not have access to patient's finances but was going to look into other options and get back to DAMIAN. SW awaits a call back.     DAMIAN spoke with Aarti Butler at [facility] and she stated she will accept patient once she has a payor source. Aarti Butler spoke with Dayami to update her on this information and per Aarti Butler, Dayami was working on getting emergency conservatorship for patient. DAMIAN spoke with Dayami to ensure that Dayami has all needed resources. Dayami states she has calls out to 2 attorneys and to Marv with [facility]. DAMIAN will update Dayami with any new information.    ADDENDUM  I: DAMIAN spoke with [facility] worker to ask if they had any resources to expedite emergency guardianship/conservatorship. Marv stated \" we can't do that but I did advocate on behalf of the patient and my team member, Manan Benson, is willing to file MA application on behalf of patient.\" per Aarti Fair at [facility] is considering but concerned due to patient being able to walk away. If Aarti Butler is not agreeable, DAMIAN would be willing to request a commitment be filed for patient to have her committed to facility if Aarti Butler would be agreeable to accept.     Gabrielle Sesay, [facility]   *[phone]    "

## 2017-10-26 LAB
ANION GAP SERPL CALCULATED.3IONS-SCNC: 8 MMOL/L (ref 3–14)
BUN SERPL-MCNC: 59 MG/DL (ref 7–30)
CALCIUM SERPL-MCNC: 8.8 MG/DL (ref 8.5–10.1)
CHLORIDE SERPL-SCNC: 113 MMOL/L (ref 94–109)
CO2 SERPL-SCNC: 22 MMOL/L (ref 20–32)
CREAT SERPL-MCNC: 1.56 MG/DL (ref 0.52–1.04)
GFR SERPL CREATININE-BSD FRML MDRD: 33 ML/MIN/1.7M2
GLUCOSE BLDC GLUCOMTR-MCNC: 178 MG/DL (ref 70–99)
GLUCOSE BLDC GLUCOMTR-MCNC: 182 MG/DL (ref 70–99)
GLUCOSE BLDC GLUCOMTR-MCNC: 189 MG/DL (ref 70–99)
GLUCOSE BLDC GLUCOMTR-MCNC: 200 MG/DL (ref 70–99)
GLUCOSE BLDC GLUCOMTR-MCNC: 207 MG/DL (ref 70–99)
GLUCOSE SERPL-MCNC: 198 MG/DL (ref 70–99)
POTASSIUM SERPL-SCNC: 5.3 MMOL/L (ref 3.4–5.3)
SODIUM SERPL-SCNC: 143 MMOL/L (ref 133–144)

## 2017-10-26 PROCEDURE — 99225 ZZC SUBSEQUENT OBSERVATION CARE,LEVEL II: CPT | Performed by: INTERNAL MEDICINE

## 2017-10-26 PROCEDURE — 80048 BASIC METABOLIC PNL TOTAL CA: CPT | Performed by: INTERNAL MEDICINE

## 2017-10-26 PROCEDURE — 36415 COLL VENOUS BLD VENIPUNCTURE: CPT | Performed by: INTERNAL MEDICINE

## 2017-10-26 PROCEDURE — 25000131 ZZH RX MED GY IP 250 OP 636 PS 637: Performed by: INTERNAL MEDICINE

## 2017-10-26 PROCEDURE — A9270 NON-COVERED ITEM OR SERVICE: HCPCS | Mod: GY | Performed by: INTERNAL MEDICINE

## 2017-10-26 PROCEDURE — 25000132 ZZH RX MED GY IP 250 OP 250 PS 637: Performed by: HOSPITALIST

## 2017-10-26 PROCEDURE — 25000132 ZZH RX MED GY IP 250 OP 250 PS 637: Mod: GY | Performed by: INTERNAL MEDICINE

## 2017-10-26 PROCEDURE — G0378 HOSPITAL OBSERVATION PER HR: HCPCS

## 2017-10-26 PROCEDURE — A9270 NON-COVERED ITEM OR SERVICE: HCPCS | Mod: GY | Performed by: HOSPITALIST

## 2017-10-26 PROCEDURE — 99207 ZZC CDG-CODE CATEGORY CHANGED: CPT | Performed by: INTERNAL MEDICINE

## 2017-10-26 PROCEDURE — 00000146 ZZHCL STATISTIC GLUCOSE BY METER IP

## 2017-10-26 PROCEDURE — 96372 THER/PROPH/DIAG INJ SC/IM: CPT

## 2017-10-26 PROCEDURE — 25000132 ZZH RX MED GY IP 250 OP 250 PS 637: Performed by: INTERNAL MEDICINE

## 2017-10-26 RX ORDER — METOPROLOL TARTRATE 50 MG
50 TABLET ORAL 2 TIMES DAILY
Status: DISCONTINUED | OUTPATIENT
Start: 2017-10-26 | End: 2017-10-28

## 2017-10-26 RX ORDER — GUANFACINE 1 MG/1
2 TABLET ORAL DAILY
Status: DISCONTINUED | OUTPATIENT
Start: 2017-10-26 | End: 2017-11-01 | Stop reason: HOSPADM

## 2017-10-26 RX ADMIN — METOPROLOL TARTRATE 75 MG: 25 TABLET ORAL at 08:17

## 2017-10-26 RX ADMIN — INSULIN GLARGINE 37 UNITS: 100 INJECTION, SOLUTION SUBCUTANEOUS at 21:54

## 2017-10-26 RX ADMIN — ASPIRIN 325 MG: 325 TABLET, DELAYED RELEASE ORAL at 08:17

## 2017-10-26 RX ADMIN — GUANFACINE 2 MG: 1 TABLET ORAL at 08:17

## 2017-10-26 RX ADMIN — AMLODIPINE BESYLATE 10 MG: 10 TABLET ORAL at 08:17

## 2017-10-26 RX ADMIN — METOPROLOL TARTRATE 50 MG: 50 TABLET ORAL at 21:54

## 2017-10-26 NOTE — PLAN OF CARE
Problem: Patient Care Overview  Goal: Plan of Care/Patient Progress Review  Outcome: No Change  A&O, but confused and disoriented to situation. Knew she was at the hospital and current month this shift. Denies pain. VSS, except a little hypertensive at 154/77. Did not meet parameters for PRN BP meds. Large midline scalp laceration, NATE, scabbing present near laceration. L PIV SL. Calm and cooperative on shift. Sets off bed alarm frequently so checking on patient often as patient does not call appropriately. Ambulated to bathroom on shift, SBA and walker. Put powder in groin, voided on shift. Discharge pending placement. Resting in chair currently. Chair pad alarm on.

## 2017-10-26 NOTE — PLAN OF CARE
Problem: Patient Care Overview  Goal: Plan of Care/Patient Progress Review  Outcome: Improving  A&O, but confused and disoriented to situation. VSS.  IV saline locked.  Blood sugar checks with meals, and covered with insulin per protocol.  Shower this shift.  Slight juan manuel to right cheek as a result of shaving.  Large midline scalp laceration, NATE, scabbing present near laceration. R PIV SL. Calm and cooperative on shift.  Ambulated to bathroom on shift, SBA and walker.  Discharge pending placement. Resting in chair currently. Chair pad alarm on.  Nursig will continue to monitor.

## 2017-10-26 NOTE — PROGRESS NOTES
[facility]    Hospitalist Progress Note    Date of Service (when I saw the patient): 10/26/2017    Assessment & Plan   Amber Hull is a 72 year old female who was admitted on 10/12/2017.      Worsening cognitive function and inability to care for self, suspect Alzheimer's dementia   Patient had been living with Dayami, a family friend. The patient does not have any relations. She has had gradual decline in her cognitive function suggestive of worsening dementia. Dayami thinks that this is the best she has been and says she is worse at times in terms of her memory and cognitive issues.  Despite this the patient continues to drive and is not willing to surrender her driving license and stop driving. Dayami does not think she will be able to continue to help care for Ms. Hull. Noted to have scored poorly on the MMSE. Patient was evaluated by psychiatry as per their recommendation patient does not need commitment nor geriatric psych at this point. Per psych, does not need to pursue commitment as she does have POA for health care [facility]es. She required Olanzapine on October 19.  - SW following, now applying for medicaid via state assistance   - prn Seroquel and prn Zyprexa for agitation  - ambulate QID with assist      DM II, Uncontrolled: A1C 12.8 this stay. No known/reported complications from diabetes.Patient was supposed ot have been taking Lantus 30U daily but hasn't been taking it for some time now.  on presentation.    - Mod carb diet  - 10/26: increased to Lantus 37U HS and Premeal Novolog to 4 units TID   - Medium SSI.      Hypertension  Paroxysmal atrial fibrillation  Was supposed to have been taking lisinopril 20mg BID and metoprolol 75mg BID but had not been taking either of these, now bp is improved on current medications. Was prescribed Apixaban 5mg BID which she has not been taking. Note her CHADs2 VASc score is 4, indicating a 4% yearly stroke risk.  - BP higher 10/26  -  Increased Tenex to 2 mg daily, reduced  metoprolol to 50 mg p.o. BID with hold paramaters due to asymptomatic bradycardia  - c/w Norvasc 10 mg p.o. Daily  - c/w full-dose aspirin only given fall risk  - prn IV hydralazine.   - Depending upon her disposition plan, if patient is more in safe environment, may consider starting her back on Eliquis although she remains at high risk for fall       Suspected stage III CKD:   Cr 1.42 on admission. Her baseline creatinine is around 1.4-1.6. Her creatinine went up when she was started back on her ACE inhibitor now she is off the lisinopril and received some fluids.  Her creatinine is back in normal range.   Likely patient is unable to tolerate Ace inhibitors and the presence of her chronic kidney disease .   - per staff pt eating well but not hydrating; given elevated BUN to Cr ratio 10/25 will encourage po free water   - Cr 1.79 down to 1.56 with encouragement of po intake 10/26    Fall and scalp laceration  This was likely a mechanical fall. She sustained a scalp laceration that was sutured in the ED. CT scan of her head was negative.  Received Tdap vaccine in the ED. Staples and suture removed by RN 10/23  - continue local care     DVT Prophylaxis: Ambulate every shift and PCD's.  Code Status: Full Code    Disposition: Expected discharge next week.    Reyna Cordero MD    Interval History   No new complaints. States eating and ambulating well. No new symptoms. No drainage from scalp wound, healing. Wants to know when she is leaving.    -Data reviewed today: I reviewed all new labs and imaging results over the last 24 hours. I personally reviewed no images or EKG's today.    Physical Exam   Temp: 97.1  F (36.2  C) Temp src: Oral BP: 126/56   Heart Rate: 51 Resp: 20 SpO2: 97 % O2 Device: None (Room air)    Vitals:    10/12/17 1835 10/12/17 2333   Weight: 90.7 kg (200 lb) 94.1 kg (207 lb 6.4 oz)     Vital Signs with Ranges  Temp:  [95.7  F (35.4  C)-97.3  F (36.3  C)]  97.1  F (36.2  C)  Heart Rate:  [51-61] 51  Resp:  [16-20] 20  BP: (126-161)/(56-77) 126/56  SpO2:  [95 %-97 %] 97 %  I/O last 3 completed shifts:  In: 1080 [P.O.:1080]  Out: -     Constitutional: Awake, alert, cooperative, no apparent distress, up in chair  Respiratory: Clear to auscultation bilaterally, no crackles or wheezing  Cardiovascular: Regular rate and rhythm, normal S1 and S2, and no murmur noted  GI: Normal bowel sounds, soft, non-distended, non-tender  Skin/Integumen: No rashes, no cyanosis, no edema  Other: Follows commands, appropriate mood and affect, re-directable.    Medications        guanFACINE  2 mg Oral Daily     metoprolol  50 mg Oral BID     insulin aspart  4 Units Subcutaneous TID w/meals     insulin glargine  37 Units Subcutaneous At Bedtime     sodium chloride (PF)  3 mL Intracatheter Q8H     amLODIPine  10 mg Oral Daily     aspirin EC  325 mg Oral Daily     insulin aspart  1-7 Units Subcutaneous TID AC     insulin aspart  1-5 Units Subcutaneous At Bedtime       Data     Recent Labs  Lab 10/26/17  0650 10/25/17  0650 10/22/17  0741   WBC  --   --  5.5   HGB  --   --  13.0   MCV  --   --  90   PLT  --   --  239    140 144   POTASSIUM 5.3 5.1 5.1   CHLORIDE 113* 110* 113*   CO2 22 23 23   BUN 59* 63* 34*   CR 1.56* 1.79* 1.31*   ANIONGAP 8 7 8   IDANIA 8.8 8.6 8.9   * 148* 155*       No results found for this or any previous visit (from the past 24 hour(s)).

## 2017-10-26 NOTE — PLAN OF CARE
Problem: Confusion, Chronic (Adult)  Goal: Cognitive/Functional Impairments Minimized  Patient will demonstrate the desired outcomes by discharge/transition of care.   Outcome: No Change  Pt is alert but disoriented to time and situation. Denies pain.  Calm and cooperative this shift.  Mod carb diet, Tolerated well insulin adjusted. Pt does not use call light, bed alarm on. Sat in chair for most of shift. DC pending placement.

## 2017-10-27 LAB
GLUCOSE BLDC GLUCOMTR-MCNC: 117 MG/DL (ref 70–99)
GLUCOSE BLDC GLUCOMTR-MCNC: 175 MG/DL (ref 70–99)
GLUCOSE BLDC GLUCOMTR-MCNC: 179 MG/DL (ref 70–99)
GLUCOSE BLDC GLUCOMTR-MCNC: 208 MG/DL (ref 70–99)
GLUCOSE BLDC GLUCOMTR-MCNC: 212 MG/DL (ref 70–99)

## 2017-10-27 PROCEDURE — 00000146 ZZHCL STATISTIC GLUCOSE BY METER IP

## 2017-10-27 PROCEDURE — A9270 NON-COVERED ITEM OR SERVICE: HCPCS | Mod: GY | Performed by: INTERNAL MEDICINE

## 2017-10-27 PROCEDURE — 25000132 ZZH RX MED GY IP 250 OP 250 PS 637: Performed by: INTERNAL MEDICINE

## 2017-10-27 PROCEDURE — 25000131 ZZH RX MED GY IP 250 OP 636 PS 637: Performed by: INTERNAL MEDICINE

## 2017-10-27 PROCEDURE — 99225 ZZC SUBSEQUENT OBSERVATION CARE,LEVEL II: CPT | Performed by: INTERNAL MEDICINE

## 2017-10-27 PROCEDURE — 99207 ZZC CDG-CODE CATEGORY CHANGED: CPT | Performed by: INTERNAL MEDICINE

## 2017-10-27 PROCEDURE — G0378 HOSPITAL OBSERVATION PER HR: HCPCS

## 2017-10-27 PROCEDURE — 96372 THER/PROPH/DIAG INJ SC/IM: CPT

## 2017-10-27 RX ADMIN — GUANFACINE 2 MG: 1 TABLET ORAL at 08:08

## 2017-10-27 RX ADMIN — METOPROLOL TARTRATE 50 MG: 50 TABLET ORAL at 21:34

## 2017-10-27 RX ADMIN — INSULIN GLARGINE 37 UNITS: 100 INJECTION, SOLUTION SUBCUTANEOUS at 21:27

## 2017-10-27 RX ADMIN — AMLODIPINE BESYLATE 10 MG: 10 TABLET ORAL at 08:08

## 2017-10-27 RX ADMIN — MICONAZOLE NITRATE: 2 POWDER TOPICAL at 13:14

## 2017-10-27 RX ADMIN — ASPIRIN 325 MG: 325 TABLET, DELAYED RELEASE ORAL at 08:08

## 2017-10-27 RX ADMIN — MICONAZOLE NITRATE: 2 POWDER TOPICAL at 19:26

## 2017-10-27 RX ADMIN — METOPROLOL TARTRATE 50 MG: 50 TABLET ORAL at 08:08

## 2017-10-27 NOTE — PROGRESS NOTES
[facility]    Hospitalist Progress Note    Date of Service (when I saw the patient): 10/27/2017    Assessment & Plan   Amber Hull is a 72 year old female who was admitted on 10/12/2017.      Worsening cognitive function and inability to care for self, suspect Alzheimer's dementia   Patient had been living with Dayami, a family friend. The patient does not have any relations. She has had gradual decline in her cognitive function suggestive of worsening dementia. Dayami thinks that this is the best she has been and says she is worse at times in terms of her memory and cognitive issues.  Despite this the patient continues to drive and is not willing to surrender her driving license and stop driving. Dayami does not think she will be able to continue to help care for Ms. Hull. Noted to have scored poorly on the MMSE. Patient was evaluated by psychiatry as per their recommendation patient does not need commitment nor geriatric psych at this point. Per psych, does not need to pursue commitment as she does have POA for health care Ludmila Borges. She required Olanzapine on October 19.  - SW following, now applying for medicaid via state assistance   - prn Seroquel and prn Zyprexa for agitation  - ambulate QID with assist      DM II, Uncontrolled: A1C 12.8 this stay. No known/reported complications from diabetes.Patient was supposed ot have been taking Lantus 30U daily but hasn't been taking it for some time now.  on presentation.    - Mod carb diet  - 10/26: increased to Lantus 37U HS and Premeal Novolog to 4 units TID   - 10/27: increased premeal Novolog to 5 units TID  - Medium SSI.      Hypertension  Paroxysmal atrial fibrillation  Was supposed to have been taking lisinopril 20mg BID and metoprolol 75mg BID but had not been taking either of these, now bp is improved on current medications. Was prescribed Apixaban 5mg BID which she has not been taking. Note her CHADs2 VASc score is 4,  indicating a 4% yearly stroke risk.  - Tenex to 2 mg daily, c/w metoprolol 50 mg p.o. BID with adjusted hold paramaters due to asymptomatic bradycardia (patient received BB by mistake this morning despite hold parameters, and is running HR in 40's this afternoon without symptoms, d/w RN and staff will closely monitor)  - c/w Norvasc 10 mg p.o. Daily  - c/w full-dose aspirin only given fall risk  - prn IV hydralazine.   - Depending upon her disposition plan, if patient is more in safe environment, may consider starting her back on Eliquis although she remains at high risk for fall       Suspected stage III CKD:   Cr 1.42 on admission. Her baseline creatinine is around 1.4-1.6. Her creatinine went up when she was started back on her ACE inhibitor now she is off the lisinopril and received some fluids.  Her creatinine is back in normal range. Likely patient is unable to tolerate Ace inhibitors and the presence of her chronic kidney disease. Per staff pt eating well but not hydrating; given elevated BUN to Cr ratio 10/25 encouraged free water oral intake.  - pt states drinking more water, voiding adequately per staff, recheck renal function improved 10/26 with Cr 1.56.     Fall and scalp laceration  This was likely a mechanical fall. She sustained a scalp laceration that was sutured in the ED. CT scan of her head was negative.  Received Tdap vaccine in the ED. Staples and suture removed by RN 10/23  - continue local care     DVT Prophylaxis: Pneumatic Compression Devices  Code Status: Full Code    Disposition: Expected discharge unclear, see SW notes.    Reyna Cordero MD    Interval History   No new complaints. Up in chair watching TV. Denies SOB/chest pain/dizziness. Tolerating diet. Eager to know when she will be discharged, explained unclear when at this time and she is understanding.    -Data reviewed today: I reviewed all new labs and imaging results over the last 24 hours. I personally reviewed no images or  EKG's today.    Physical Exam   Temp: 96.4  F (35.8  C) Temp src: Oral BP: 147/65   Heart Rate: 46 Resp: 16 SpO2: 97 % O2 Device: None (Room air)    Vitals:    10/12/17 1835 10/12/17 2333   Weight: 90.7 kg (200 lb) 94.1 kg (207 lb 6.4 oz)     Vital Signs with Ranges  Temp:  [96.2  F (35.7  C)-97.5  F (36.4  C)] 96.4  F (35.8  C)  Heart Rate:  [45-59] 46  Resp:  [16] 16  BP: (134-147)/(63-66) 147/65  SpO2:  [94 %-97 %] 97 %  I/O last 3 completed shifts:  In: 480 [P.O.:480]  Out: -     Constitutional: Awake, alert, cooperative, no apparent distress  Respiratory: Clear to auscultation bilaterally, no crackles or wheezing  Cardiovascular: Regular rate and rhythm, normal S1 and S2, and no murmur noted  GI: Normal bowel sounds, soft, non-distended, non-tender  Skin/Integumen: No rashes, no cyanosis, no edema    Medications        insulin aspart  5 Units Subcutaneous TID w/meals     guanFACINE  2 mg Oral Daily     metoprolol  50 mg Oral BID     insulin glargine  37 Units Subcutaneous At Bedtime     sodium chloride (PF)  3 mL Intracatheter Q8H     amLODIPine  10 mg Oral Daily     aspirin EC  325 mg Oral Daily     insulin aspart  1-7 Units Subcutaneous TID AC     insulin aspart  1-5 Units Subcutaneous At Bedtime       Data     Recent Labs  Lab 10/26/17  0650 10/25/17  0650 10/22/17  0741   WBC  --   --  5.5   HGB  --   --  13.0   MCV  --   --  90   PLT  --   --  239    140 144   POTASSIUM 5.3 5.1 5.1   CHLORIDE 113* 110* 113*   CO2 22 23 23   BUN 59* 63* 34*   CR 1.56* 1.79* 1.31*   ANIONGAP 8 7 8   IDANIA 8.8 8.6 8.9   * 148* 155*       No results found for this or any previous visit (from the past 24 hour(s)).

## 2017-10-27 NOTE — PLAN OF CARE
Problem: Confusion, Chronic (Adult)  Goal: Cognitive/Functional Impairments Minimized  Patient will demonstrate the desired outcomes by discharge/transition of care.   Outcome: No Change  A&O, disoriented to situation. VSS.  Blood sugar checks with meals, and covered with insulin per protocol.  Large midline scalp laceration, NATE, scabbing present near laceration. R PIV SL. Calm and cooperative on shift.  Ambulated to bathroom on shift, SBA in room, refused to ambulated in hallway this shift.  Discharge pending placement. Resting in chair currently. Chair pad alarm on.

## 2017-10-27 NOTE — PLAN OF CARE
Problem: Patient Care Overview  Goal: Plan of Care/Patient Progress Review  Outcome: No Change  VSS. No c/o pain. Steady with ambulation, impulsive and shuts off bed alarm. Awaiting placement and guardianship.

## 2017-10-27 NOTE — PROVIDER NOTIFICATION
MD Notification    Notified Person:  MD Cordero    Notified Persons Name: Hospitalist    Notification Date/Time: 10/27/17 1400    Notification Interaction:  Talked with Physician    Purpose of Notification:  Updated MD that vitals were entered incorrectly (another patient's vitals) this morning by NA and indicated that the pt heart rate was 62.  Subsequently metoprolol was given this morning by RN.  Of note, there are parameters to hold metoprolol if heart rate less than 60.  The NA realized her error and then entered the correct vitals indicating the pt heart rate was 45.  Pt has been monitored and denies dizziness, lightheadedness and appears steady on her feet.      Orders Received:  Continue to monitor heart rate throughout the day.      Comments:

## 2017-10-27 NOTE — PROGRESS NOTES
DAMIAN  I: DAMIAN received a call from Aarti Butler at [facility] who stated they would accept patient MA pending if [facility] would be willing to fill out uziel. Aarti Butler is aware that patient has HC POA and is ok with accepting patient knowing that POA will make decisions but would like to know that guardianship/conservatorship is in process. DAMIAN will need to discuss with Marv to get MA uziel completed and Dayami to check status of meeting from this AM with . Aarti Butler requested SW update her on Monday 11/30.    P: DAMIAN will continue to follow and assist as needed.    Gabrielle Sesay, MA[facility]   *[phone]

## 2017-10-27 NOTE — PLAN OF CARE
Problem: Patient Care Overview  Goal: Plan of Care/Patient Progress Review  Outcome: Improving  AO x4 but forgetful.  Pleasant.  VSS but bradycardic after metoprolol given. Denied pain. Steady with ambulation, voiding in bathroom.  Miconazole powder applied to reddened cristy area.  Good appetite.  Blood sugars covered per protocol.  Awaiting placement and guardianship, per Care coordinator it may be mid next week she will go to [facility].  Nursing will continue to monitor.

## 2017-10-28 LAB
GLUCOSE BLDC GLUCOMTR-MCNC: 107 MG/DL (ref 70–99)
GLUCOSE BLDC GLUCOMTR-MCNC: 134 MG/DL (ref 70–99)
GLUCOSE BLDC GLUCOMTR-MCNC: 196 MG/DL (ref 70–99)
GLUCOSE BLDC GLUCOMTR-MCNC: 205 MG/DL (ref 70–99)
GLUCOSE BLDC GLUCOMTR-MCNC: 266 MG/DL (ref 70–99)

## 2017-10-28 PROCEDURE — 25000132 ZZH RX MED GY IP 250 OP 250 PS 637: Mod: GY | Performed by: INTERNAL MEDICINE

## 2017-10-28 PROCEDURE — A9270 NON-COVERED ITEM OR SERVICE: HCPCS | Mod: GY | Performed by: INTERNAL MEDICINE

## 2017-10-28 PROCEDURE — 99207 ZZC CDG-CODE CATEGORY CHANGED: CPT | Performed by: INTERNAL MEDICINE

## 2017-10-28 PROCEDURE — 25000131 ZZH RX MED GY IP 250 OP 636 PS 637: Performed by: INTERNAL MEDICINE

## 2017-10-28 PROCEDURE — 96372 THER/PROPH/DIAG INJ SC/IM: CPT

## 2017-10-28 PROCEDURE — 00000146 ZZHCL STATISTIC GLUCOSE BY METER IP

## 2017-10-28 PROCEDURE — 99225 ZZC SUBSEQUENT OBSERVATION CARE,LEVEL II: CPT | Performed by: INTERNAL MEDICINE

## 2017-10-28 PROCEDURE — 25000132 ZZH RX MED GY IP 250 OP 250 PS 637: Performed by: INTERNAL MEDICINE

## 2017-10-28 PROCEDURE — G0378 HOSPITAL OBSERVATION PER HR: HCPCS

## 2017-10-28 RX ORDER — METOPROLOL TARTRATE 25 MG/1
25 TABLET, FILM COATED ORAL 2 TIMES DAILY
Status: DISCONTINUED | OUTPATIENT
Start: 2017-10-28 | End: 2017-10-29

## 2017-10-28 RX ORDER — HYDROCHLOROTHIAZIDE 12.5 MG/1
12.5 CAPSULE ORAL DAILY
Status: DISCONTINUED | OUTPATIENT
Start: 2017-10-28 | End: 2017-11-01 | Stop reason: HOSPADM

## 2017-10-28 RX ADMIN — AMLODIPINE BESYLATE 10 MG: 10 TABLET ORAL at 08:20

## 2017-10-28 RX ADMIN — MICONAZOLE NITRATE: 2 POWDER TOPICAL at 22:33

## 2017-10-28 RX ADMIN — GUANFACINE 2 MG: 1 TABLET ORAL at 08:20

## 2017-10-28 RX ADMIN — ASPIRIN 325 MG: 325 TABLET, DELAYED RELEASE ORAL at 08:20

## 2017-10-28 RX ADMIN — HYDROCHLOROTHIAZIDE 12.5 MG: 12.5 CAPSULE ORAL at 08:20

## 2017-10-28 RX ADMIN — MICONAZOLE NITRATE: 2 POWDER TOPICAL at 05:35

## 2017-10-28 RX ADMIN — INSULIN GLARGINE 37 UNITS: 100 INJECTION, SOLUTION SUBCUTANEOUS at 22:33

## 2017-10-28 NOTE — PLAN OF CARE
Problem: Patient Care Overview  Goal: Plan of Care/Patient Progress Review  Outcome: Improving  Disoriented to situation and forgetful at times. Impulsive occasionally, setting off bed alarm. Slept well in between cares. HR bradycardic in 40s, asymptomatic. Other VSS. Denies complaints. Incontinent at times, miconazole powder applied. Up with SBA. Awaiting placement.

## 2017-10-28 NOTE — PLAN OF CARE
Problem: Patient Care Overview  Goal: Plan of Care/Patient Progress Review  Outcome: No Change   Disoriented to situation and time. Forgetful, but cooperative. Up in chair. Incontinent of urine, also up to bathroom with SBA. Will dc when placement is found.

## 2017-10-28 NOTE — PLAN OF CARE
Problem: Patient Care Overview  Goal: Plan of Care/Patient Progress Review  Outcome: No Change  Pt continues to be disoriented to situation and forgetful at times. Cooperative today. Up in chair all shift. HR bradycardic in 40s, asymptomatic. Other VSS. Denies complaints. Incontinent at times, miconazole powder applied. Up with SBA. Awaiting placement.

## 2017-10-28 NOTE — PLAN OF CARE
Problem: Diabetes, Type 2 (Adult)  Goal: Signs and Symptoms of Listed Potential Problems Will be Absent, Minimized or Managed (Diabetes, Type 2)  Signs and symptoms of listed potential problems will be absent, minimized or managed by discharge/transition of care (reference Diabetes, Type 2 (Adult) CPG).   Outcome: No Change  AO x4 but forgetful.  Pleasant.  BP slightly elevated, HR-bradycardic, held metoprolol.  Did not meet parameters for PRN hydralazine.  Pt denied pain.  R PIV saline locked.  Blood sugars 212 and 175, insulin given per MAR.  Pt SBA in room with ambulation, steady gait.  Encouraged to ambulate in halls, but pt refused this evening.  Incision to scalp, intact with scabs.  Miconazole applied to groin.  Discharge pending placement to [facility].  Will continue to monitor.

## 2017-10-28 NOTE — PROGRESS NOTES
[facility]    Hospitalist Progress Note    Date of Service (when I saw the patient): 10/28/2017    Assessment & Plan   Amber Hull is a 72 year old female who was admitted on 10/12/2017.      Worsening cognitive function and inability to care for self, suspect Alzheimer's dementia   Patient had been living with Dayami, a family friend. The patient does not have any relations. She has had gradual decline in her cognitive function suggestive of worsening dementia. Dayami thinks that this is the best she has been and says she is worse at times in terms of her memory and cognitive issues.  Despite this the patient continues to drive and is not willing to surrender her driving license and stop driving. Dayami does not think she will be able to continue to help care for Ms. Hull. Noted to have scored poorly on the MMSE. Patient was evaluated by psychiatry as per their recommendation patient does not need commitment nor geriatric psych at this point. Per psych, does not need to pursue commitment as she does have POA for health care Ludmila Borges. She required Olanzapine on October 19.  - SW following, now applying for medicaid via state assistance   - prn Seroquel and prn Zyprexa for agitation  - ambulate QID with assist    Hypertension  Paroxysmal atrial fibrillation  Was supposed to have been taking lisinopril 20mg BID and metoprolol 75mg BID but had not been taking either of these, now bp is improved on current medications. Was prescribed Apixaban 5mg BID which she has not been taking. Note her CHADs2 VASc score is 4, indicating a 4% yearly stroke risk.  - Tenex to 2 mg daily  - decrease metoprolol to 25 mg p.o. BID w/ hold paramaters due to asymptomatic bradycardia --> if continues to remain bradycardic (likely from tenex initiation this stay, will discontinue BB)  - started HCTZ 12.5mg daily with hold parameters  - paged re: elevated BP but on manual recheck it was 110/70  - c/w Norvasc 10 mg  p.o. Daily  - c/w full-dose aspirin only given fall risk  - prn IV hydralazine and labetalol  - Depending upon her disposition plan, if patient is more in safe environment, may consider starting her back on Eliquis although she remains at high risk for fall       DM II, Uncontrolled: A1C 12.8 this stay. No known/reported complications from diabetes.Patient was supposed ot have been taking Lantus 30U daily but hasn't been taking it for some time now.  on presentation.    - Mod carb diet  - 10/26: increased to Lantus 37U HS and Premeal Novolog to 4 units TID   - 10/28: increased premeal Novolog to 7 units TID  - Medium SSI.      Suspected stage III CKD:   Cr 1.42 on admission. Her baseline creatinine is around 1.4-1.6. Her creatinine went up when she was started back on her ACE inhibitor now she is off the lisinopril and received some fluids.  Her creatinine is back in normal range. Likely patient is unable to tolerate Ace inhibitors and the presence of her chronic kidney disease. Per staff pt eating well but not hydrating; given elevated BUN to Cr ratio 10/25 encouraged free water oral intake.  - pt states drinking more water, voiding adequately per staff, recheck renal function improved 10/26 with Cr 1.56.      Fall and scalp laceration  This was likely a mechanical fall. She sustained a scalp laceration that was sutured in the ED. CT scan of her head was negative.  Received Tdap vaccine in the ED. Staples and suture removed by RN 10/23  - continue local care     DVT Prophylaxis: Ambulate every shift  Code Status: Full Code    Disposition: Expected discharge early next week, see  notes.    Reyna Cordero MD    Interval History   No new complaints. Denies headache/dizziness/pain. Ambulating. Tolerating diet.    -Data reviewed today: I reviewed all new labs and imaging results over the last 24 hours. I personally reviewed no images or EKG's today.    Physical Exam   Temp: 97.5  F (36.4  C) Temp src: Oral  BP: 110/70 (manual BP by RN, no PRN needed) Pulse: (!) 47 Heart Rate: 48 Resp: 14 SpO2: 97 % O2 Device: None (Room air)    Vitals:    10/12/17 1835 10/12/17 2333   Weight: 90.7 kg (200 lb) 94.1 kg (207 lb 6.4 oz)     Vital Signs with Ranges  Temp:  [95.7  F (35.4  C)-97.5  F (36.4  C)] 97.5  F (36.4  C)  Pulse:  [47] 47  Heart Rate:  [44-52] 48  Resp:  [14-16] 14  BP: (110-215)/() 110/70  SpO2:  [94 %-97 %] 97 %  I/O last 3 completed shifts:  In: 720 [P.O.:720]  Out: -     Constitutional: Awake, alert, cooperative, no apparent distress  Respiratory: Clear to auscultation bilaterally, no crackles or wheezing  Cardiovascular: Regular rate and rhythm, normal S1 and S2, and no murmur noted  GI: Normal bowel sounds, soft, non-distended, non-tender  Skin/Integumen: No rashes, no cyanosis, trace pretibial edema b/l, right orbital bruise almost gone, scalp laceration healing, without drainage    Medications        hydrochlorothiazide  12.5 mg Oral Daily     metoprolol  25 mg Oral BID     insulin aspart  7 Units Subcutaneous TID w/meals     guanFACINE  2 mg Oral Daily     insulin glargine  37 Units Subcutaneous At Bedtime     sodium chloride (PF)  3 mL Intracatheter Q8H     amLODIPine  10 mg Oral Daily     aspirin EC  325 mg Oral Daily     insulin aspart  1-7 Units Subcutaneous TID AC     insulin aspart  1-5 Units Subcutaneous At Bedtime       Data     Recent Labs  Lab 10/26/17  0650 10/25/17  0650 10/22/17  0741   WBC  --   --  5.5   HGB  --   --  13.0   MCV  --   --  90   PLT  --   --  239    140 144   POTASSIUM 5.3 5.1 5.1   CHLORIDE 113* 110* 113*   CO2 22 23 23   BUN 59* 63* 34*   CR 1.56* 1.79* 1.31*   ANIONGAP 8 7 8   IDANIA 8.8 8.6 8.9   * 148* 155*       No results found for this or any previous visit (from the past 24 hour(s)).

## 2017-10-29 LAB — GLUCOSE BLDC GLUCOMTR-MCNC: 132 MG/DL (ref 70–99)

## 2017-10-29 PROCEDURE — 25000131 ZZH RX MED GY IP 250 OP 636 PS 637: Performed by: INTERNAL MEDICINE

## 2017-10-29 PROCEDURE — 99207 ZZC CDG-CODE CATEGORY CHANGED: CPT | Performed by: INTERNAL MEDICINE

## 2017-10-29 PROCEDURE — A9270 NON-COVERED ITEM OR SERVICE: HCPCS | Mod: GY | Performed by: INTERNAL MEDICINE

## 2017-10-29 PROCEDURE — G0378 HOSPITAL OBSERVATION PER HR: HCPCS

## 2017-10-29 PROCEDURE — 25000132 ZZH RX MED GY IP 250 OP 250 PS 637: Performed by: INTERNAL MEDICINE

## 2017-10-29 PROCEDURE — 00000146 ZZHCL STATISTIC GLUCOSE BY METER IP

## 2017-10-29 PROCEDURE — 25000132 ZZH RX MED GY IP 250 OP 250 PS 637: Mod: GY | Performed by: INTERNAL MEDICINE

## 2017-10-29 PROCEDURE — 99225 ZZC SUBSEQUENT OBSERVATION CARE,LEVEL II: CPT | Performed by: INTERNAL MEDICINE

## 2017-10-29 PROCEDURE — 96372 THER/PROPH/DIAG INJ SC/IM: CPT

## 2017-10-29 RX ADMIN — AMLODIPINE BESYLATE 10 MG: 10 TABLET ORAL at 08:15

## 2017-10-29 RX ADMIN — MICONAZOLE NITRATE: 2 POWDER TOPICAL at 06:44

## 2017-10-29 RX ADMIN — GUANFACINE 2 MG: 1 TABLET ORAL at 08:15

## 2017-10-29 RX ADMIN — HYDROCHLOROTHIAZIDE 12.5 MG: 12.5 CAPSULE ORAL at 08:15

## 2017-10-29 RX ADMIN — INSULIN ASPART 9 UNITS: 100 INJECTION, SOLUTION INTRAVENOUS; SUBCUTANEOUS at 11:59

## 2017-10-29 RX ADMIN — INSULIN GLARGINE 37 UNITS: 100 INJECTION, SOLUTION SUBCUTANEOUS at 21:52

## 2017-10-29 RX ADMIN — INSULIN ASPART 9 UNITS: 100 INJECTION, SOLUTION INTRAVENOUS; SUBCUTANEOUS at 17:40

## 2017-10-29 RX ADMIN — ASPIRIN 325 MG: 325 TABLET, DELAYED RELEASE ORAL at 08:15

## 2017-10-29 NOTE — PROGRESS NOTES
[facility]    Hospitalist Progress Note    Date of Service (when I saw the patient): 10/29/2017    Assessment & Plan   Amber Hull is a 72 year old female who was admitted on 10/12/2017.      Worsening cognitive function and inability to care for self, suspect Alzheimer's dementia   Patient had been living with Dayami, a family friend. The patient does not have any relations. She has had gradual decline in her cognitive function suggestive of worsening dementia. Dayami thinks that this is the best she has been and says she is worse at times in terms of her memory and cognitive issues.  Despite this the patient continues to drive and is not willing to surrender her driving license and stop driving. Dayami does not think she will be able to continue to help care for Ms. Hull. Noted to have scored poorly on the MMSE. Patient was evaluated by psychiatry as per their recommendation patient does not need commitment nor geriatric psych at this point. Per psych, does not need to pursue commitment as she does have POA for health care Ludmila Borges. She required Olanzapine on October 19.  - SW following, now applying for medicaid via state assistance   - prn Seroquel and prn Zyprexa for agitation  - ambulate QID with assist     Hypertension  Paroxysmal atrial fibrillation  Was supposed to have been taking lisinopril 20mg BID and metoprolol 75mg BID but had not been taking either of these, now bp is improved on current medications. Was prescribed Apixaban 5mg BID which she has not been taking. Note her CHADs2 VASc score is 4, indicating a 4% yearly stroke risk. Discontinued metoprolol 10/29.  - c/w Tenex to 2 mg daily, HCTZ 12.5mg daily, and Norvasc 10 mg p.o. Daily  - c/w full-dose aspirin only given fall risk  - prn IV hydralazine and labetalol  - Depending upon her disposition plan, if patient is more in safe environment, may consider starting her back on Eliquis although she remains at high risk for  fall       DM II, Uncontrolled: A1C 12.8 this stay. No known/reported complications from diabetes.Patient was supposed ot have been taking Lantus 30U daily but hasn't been taking it for some time now.  on presentation.    - Mod carb diet  - c/w Lantus 37U HS   - 10/29: increased premeal Novolog to 9 units TID  - Medium SSI.      Suspected stage III CKD:   Cr 1.42 on admission. Her baseline creatinine is around 1.4-1.6. Her creatinine went up when she was started back on her ACE inhibitor now she is off the lisinopril and received some fluids.  Her creatinine is back in normal range. Likely patient is unable to tolerate Ace inhibitors and the presence of her chronic kidney disease. Per staff pt eating well but not hydrating; given elevated BUN to Cr ratio 10/25 encouraged free water oral intake. Pt states drinking more water, voiding adequately per staff, recheck renal function improved 10/26 with Cr 1.56.  - monitor, BMP in AM      Fall and scalp laceration  This was likely a mechanical fall. She sustained a scalp laceration that was sutured in the ED. CT scan of her head was negative.  Received Tdap vaccine in the ED. Staples and suture removed by RN 10/23  - continue local care     DVT Prophylaxis: Pneumatic Compression Devices  Code Status: Full Code    Disposition: Expected discharge day to day, see SW notes.    Reyna Cordero MD    Interval History   No new complaints. Ambulating with staff without dizziness. Asks when she will be discharging. Per staff, pt lost IV access. BP's stable. OK to leave out.    -Data reviewed today: I reviewed all new labs and imaging results over the last 24 hours. I personally reviewed no images or EKG's today.    Physical Exam   Temp: 96.9  F (36.1  C) Temp src: Oral BP: 113/59  Heart Rate: 52 Resp: 16 SpO2: 98 % O2 Device: None (Room air)    Vitals:    10/12/17 1835 10/12/17 2333   Weight: 90.7 kg (200 lb) 94.1 kg (207 lb 6.4 oz)     Vital Signs with Ranges  Temp:  [96   F (35.6  C)-97.5  F (36.4  C)] 96.9  F (36.1  C)  Heart Rate:  [47-53] 52  Resp:  [14-16] 16  BP: (110-215)/() 113/59  SpO2:  [94 %-98 %] 98 %  I/O last 3 completed shifts:  In: 940 [P.O.:940]  Out: -     Constitutional: Awake, alert, cooperative, no apparent distress  Respiratory: Clear to auscultation bilaterally, no crackles or wheezing  Cardiovascular: Regular rate and rhythm, normal S1 and S2, and no murmur noted  GI: Normal bowel sounds, soft, non-distended, non-tender  Skin/Integumen: No rashes, no cyanosis, trace pretibial edema b/l    Medications        insulin aspart  9 Units Subcutaneous TID w/meals     hydrochlorothiazide  12.5 mg Oral Daily     guanFACINE  2 mg Oral Daily     insulin glargine  37 Units Subcutaneous At Bedtime     sodium chloride (PF)  3 mL Intracatheter Q8H     amLODIPine  10 mg Oral Daily     aspirin EC  325 mg Oral Daily     insulin aspart  1-7 Units Subcutaneous TID AC     insulin aspart  1-5 Units Subcutaneous At Bedtime       Data     Recent Labs  Lab 10/26/17  0650 10/25/17  0650    140   POTASSIUM 5.3 5.1   CHLORIDE 113* 110*   CO2 22 23   BUN 59* 63*   CR 1.56* 1.79*   ANIONGAP 8 7   IDANIA 8.8 8.6   * 148*       No results found for this or any previous visit (from the past 24 hour(s)).

## 2017-10-29 NOTE — PLAN OF CARE
Problem: Patient Care Overview  Goal: Plan of Care/Patient Progress Review  Outcome: No Change  Disoriented to situation. Forgetful, but cooperative. Denies pain. Bradycardic in 40-50s. Other VSS. Metoprolol dc'ed. BGM.  Pt lost IV access- MD aware (pt care order to not replace). Pt needs reminders to change brief d/t incontinence. Minconazole powder applied to groin. Awaiting placement, sw following.

## 2017-10-29 NOTE — PLAN OF CARE
Problem: Patient Care Overview  Goal: Plan of Care/Patient Progress Review  Outcome: Improving  Disoriented to situation. Forgetful, but cooperative. Denies pain. Bradycardic in 40-50s. Other VSS. Metoprolol held last evening. Asymptomatic. Pt needs reminders to change brief d/t incontinence. Minconazole powder applied to groin. Awaiting placement, sw following.

## 2017-10-29 NOTE — PLAN OF CARE
Problem: Patient Care Overview  Goal: Plan of Care/Patient Progress Review  Outcome: No Change  Disoriented but pleasant. Up in chair during this shift. Incontinent, needs reminders to change brief. Bell area reddened, powder available. Glucose monitoring continues. No IV access, MD aware. Plan for dc when placement is found.

## 2017-10-30 LAB
ANION GAP SERPL CALCULATED.3IONS-SCNC: 7 MMOL/L (ref 3–14)
BUN SERPL-MCNC: 42 MG/DL (ref 7–30)
CALCIUM SERPL-MCNC: 8.9 MG/DL (ref 8.5–10.1)
CHLORIDE SERPL-SCNC: 112 MMOL/L (ref 94–109)
CO2 SERPL-SCNC: 24 MMOL/L (ref 20–32)
CREAT SERPL-MCNC: 1.35 MG/DL (ref 0.52–1.04)
GFR SERPL CREATININE-BSD FRML MDRD: 39 ML/MIN/1.7M2
GLUCOSE BLDC GLUCOMTR-MCNC: 116 MG/DL (ref 70–99)
GLUCOSE BLDC GLUCOMTR-MCNC: 126 MG/DL (ref 70–99)
GLUCOSE BLDC GLUCOMTR-MCNC: 159 MG/DL (ref 70–99)
GLUCOSE BLDC GLUCOMTR-MCNC: 163 MG/DL (ref 70–99)
GLUCOSE BLDC GLUCOMTR-MCNC: 173 MG/DL (ref 70–99)
GLUCOSE BLDC GLUCOMTR-MCNC: 198 MG/DL (ref 70–99)
GLUCOSE BLDC GLUCOMTR-MCNC: 223 MG/DL (ref 70–99)
GLUCOSE BLDC GLUCOMTR-MCNC: 234 MG/DL (ref 70–99)
GLUCOSE SERPL-MCNC: 123 MG/DL (ref 70–99)
POTASSIUM SERPL-SCNC: 4.9 MMOL/L (ref 3.4–5.3)
SODIUM SERPL-SCNC: 143 MMOL/L (ref 133–144)

## 2017-10-30 PROCEDURE — A9270 NON-COVERED ITEM OR SERVICE: HCPCS | Mod: GY | Performed by: INTERNAL MEDICINE

## 2017-10-30 PROCEDURE — 99207 ZZC CDG-MDM COMPONENT: MEETS LOW - DOWN CODED: CPT | Performed by: INTERNAL MEDICINE

## 2017-10-30 PROCEDURE — 25000131 ZZH RX MED GY IP 250 OP 636 PS 637: Performed by: INTERNAL MEDICINE

## 2017-10-30 PROCEDURE — 96372 THER/PROPH/DIAG INJ SC/IM: CPT

## 2017-10-30 PROCEDURE — 00000146 ZZHCL STATISTIC GLUCOSE BY METER IP

## 2017-10-30 PROCEDURE — 80048 BASIC METABOLIC PNL TOTAL CA: CPT | Performed by: INTERNAL MEDICINE

## 2017-10-30 PROCEDURE — 99225 ZZC SUBSEQUENT OBSERVATION CARE,LEVEL II: CPT | Performed by: INTERNAL MEDICINE

## 2017-10-30 PROCEDURE — G0378 HOSPITAL OBSERVATION PER HR: HCPCS

## 2017-10-30 PROCEDURE — 25000132 ZZH RX MED GY IP 250 OP 250 PS 637: Performed by: INTERNAL MEDICINE

## 2017-10-30 PROCEDURE — 36415 COLL VENOUS BLD VENIPUNCTURE: CPT | Performed by: INTERNAL MEDICINE

## 2017-10-30 RX ORDER — HYDRALAZINE HYDROCHLORIDE 10 MG/1
10 TABLET, FILM COATED ORAL 3 TIMES DAILY
Status: DISCONTINUED | OUTPATIENT
Start: 2017-10-30 | End: 2017-11-01 | Stop reason: HOSPADM

## 2017-10-30 RX ADMIN — GUANFACINE 2 MG: 1 TABLET ORAL at 08:20

## 2017-10-30 RX ADMIN — INSULIN ASPART 9 UNITS: 100 INJECTION, SOLUTION INTRAVENOUS; SUBCUTANEOUS at 08:22

## 2017-10-30 RX ADMIN — HYDRALAZINE HYDROCHLORIDE 10 MG: 10 TABLET ORAL at 16:04

## 2017-10-30 RX ADMIN — ASPIRIN 325 MG: 325 TABLET, DELAYED RELEASE ORAL at 08:21

## 2017-10-30 RX ADMIN — AMLODIPINE BESYLATE 10 MG: 10 TABLET ORAL at 08:21

## 2017-10-30 RX ADMIN — HYDRALAZINE HYDROCHLORIDE 10 MG: 10 TABLET ORAL at 21:04

## 2017-10-30 RX ADMIN — MICONAZOLE NITRATE: 2 POWDER TOPICAL at 11:01

## 2017-10-30 RX ADMIN — MICONAZOLE NITRATE: 2 POWDER TOPICAL at 21:10

## 2017-10-30 RX ADMIN — INSULIN ASPART 9 UNITS: 100 INJECTION, SOLUTION INTRAVENOUS; SUBCUTANEOUS at 12:46

## 2017-10-30 RX ADMIN — INSULIN GLARGINE 37 UNITS: 100 INJECTION, SOLUTION SUBCUTANEOUS at 21:04

## 2017-10-30 RX ADMIN — INSULIN ASPART 9 UNITS: 100 INJECTION, SOLUTION INTRAVENOUS; SUBCUTANEOUS at 17:40

## 2017-10-30 RX ADMIN — HYDROCHLOROTHIAZIDE 12.5 MG: 12.5 CAPSULE ORAL at 08:21

## 2017-10-30 NOTE — PLAN OF CARE
Problem: Confusion, Chronic (Adult)  Goal: Free from Harm/Injuries  Patient will demonstrate the desired outcomes by discharge/transition of care.   Outcome: No Change  Rested well overnight. No c/o pain. Disorienatated to situation, impulsive at times. Dc pending placement, SW following. Will continue to monitor.

## 2017-10-30 NOTE — PLAN OF CARE
Problem: Confusion, Chronic (Adult)  Goal: Cognitive/Functional Impairments Minimized  Patient will demonstrate the desired outcomes by discharge/transition of care.   Outcome: No Change  VSS.  Disoriented to situation and forgetful.   Denies pain.  Bell area and area under breasts moist and have yeast rash present, miconazole powder applied.  Turns off chair alarm.  Impulsive at times.  Discharge pending placement.

## 2017-10-30 NOTE — PROGRESS NOTES
[facility]    Hospitalist Progress Note    Date of Service (when I saw the patient): 10/30/2017    Assessment & Plan   Hospital Cumulative Summary: Amber Hull is a 72 year old female who was admitted on 10/12/2017 her PMH is significant for hypertension, paroxysmal afib and DM with suspected cognitive decline and inability to care for herself who was admitted under observation on 10/12/2017 for evaluation after a fall which resulted in a scalp laceration. Patient was admitted for further evaluation and management.she got sutures for the scalp laceration. She was living the friend Dayami but due to overall decline in patient status , she does not think that she can continue to help patient care.she was evaluated by psych and was found to have significant memory impairment and did very poor on her MMS, her neurocognitive deficits were consistent with Alzheimer's dementia ,  and  are following closely for disposition.  During her stay she was started back on her insulin and insulin is adjusted accordingly she is also started back on her antihypertensive medications she seemed to have elevated creatinine and her Ace inhibitors are stopped. currently she is doing well on the current regimen for HTN and diabetes and her creatinine is back to her baseline.she is also doing very well on the current psych med's and has not required any PRN medications for agitation since Oct 19 th.      Worsening cognitive function and inability to care for self, suspect Alzheimer's dementia :  Patient had been living with Dayami, a family friend. The patient does not have any relations. She has had gradual decline in her cognitive function suggestive of worsening dementia. Dayami thinks that this is the best she has been and says she is worse at times in terms of her memory and cognitive issues.  Despite this the patient continues to drive and is not willing to surrender her driving license  and stop driving. Dayami does not think she will be able to continue to help care for Ms. Hull. Noted to have scored poorly on the MMSE. Patient was evaluated by psychiatry as per their recommendation patient does not need commitment nor geriatric psych at this point. Per psych, does not need to pursue commitment as she does have POA for health care Ludmila Borges. She required Olanzapine on October 19.    - SW following, now applying for medicaid via state assistance   - prn Seroquel and prn Zyprexa for agitation ( not requiring as she is very stable on the current psych meds)  - ambulate QID with assist     Hypertension  Paroxysmal atrial fibrillation :  Was supposed to have been taking lisinopril 20mg BID and metoprolol 75mg BID but had not been taking either of these, now bp is improved on current medications. Was prescribed Apixaban 5mg BID which she has not been taking. Note her CHADs2 VASc score is 4, indicating a 4% yearly stroke risk. Discontinued metoprolol 10/29.    - Continue Tenex to 2 mg daily, HCTZ 12.5mg daily, and Norvasc 10 mg p.o. Daily  - will monitor for tachycardia as BB was stopped on 10/29  - Full-dose aspirin only given fall risk  - prn IV hydralazine and labetalol  - Depending upon her disposition plan, if patient is more in safe environment, may consider starting her back on Eliquis although she remains at high risk for fall       DM II, Uncontrolled: A1C 12.8 this stay. No known/reported complications from diabetes.Patient was supposed ot have been taking Lantus 30U daily but hasn't been taking it for some time now.  on presentation. Now much better on the current regimen.Expecting HgA1c to improve in the next 3 months     - Mod carb diet  - Lantus 37U HS   - Premeal Novolog to 9 units TID  - Medium SSI.      Suspected stage III CKD: Cr 1.42 on admission. Her baseline creatinine is around 1.4-1.6. Her creatinine went up when she was started back on her ACE inhibitor now she is off  the lisinopril and received some fluids.  Her creatinine is back in normal range. Likely patient is unable to tolerate Ace inhibitors and the presence of her chronic kidney disease. Creatinine is in normal range   - will monitor BMP from time to time.      Fall and scalp laceration: This was likely a mechanical fall. She sustained a scalp laceration that was sutured in the ED. CT scan of her head was negative.  Received Tdap vaccine in the ED. Staples and suture removed by RN 10/23  - continue local care     DVT Prophylaxis: Pneumatic Compression Devices    Code Status: Full Code    Disposition: patient is medically stable to be discharge , plan to discharge when safe disposition is available   Appreciate  and  help helping with the discharge planning.    Inessa Barton MD, [facility] medicine     Interval History   Patient care was assumed this morning , known to me from a week ago , she told me that she is doing better and is hoping that she can get the bed this week and is able to leave hospital, she is hoping that her BP and diabetes would be better controlled with the additional help after the discharge .    -Data reviewed today: I reviewed all new labs and imaging results over the last 24 hours. I personally reviewed no images or EKG's today.    Physical Exam   Temp: 96.8  F (36  C) Temp src: Oral BP: 150/71 Pulse: 60 Heart Rate: 53 Resp: 16 SpO2: 98 % O2 Device: None (Room air)    Vitals:    10/12/17 1835 10/12/17 2333   Weight: 90.7 kg (200 lb) 94.1 kg (207 lb 6.4 oz)     Vital Signs with Ranges  Temp:  [96.4  F (35.8  C)-97.8  F (36.6  C)] 96.8  F (36  C)  Pulse:  [60] 60  Heart Rate:  [52-58] 53  Resp:  [16-18] 16  BP: (149-160)/(66-74) 150/71  SpO2:  [94 %-98 %] 98 %  I/O last 3 completed shifts:  In: 1350 [P.O.:1350]  Out: -     Constitutional: Awake, alert, cooperative, no apparent distress, sitting in chair.  Respiratory: Clear to auscultation bilaterally, no crackles or  wheezing  Cardiovascular: Regular rate and rhythm, normal S1 and S2, and no murmur noted  GI: Normal bowel sounds, soft, non-distended, non-tender  Skin/Integumen: No rashes, no cyanosis, trace pretibial edema B/L    Medications        insulin aspart  9 Units Subcutaneous TID w/meals     hydrochlorothiazide  12.5 mg Oral Daily     guanFACINE  2 mg Oral Daily     insulin glargine  37 Units Subcutaneous At Bedtime     amLODIPine  10 mg Oral Daily     aspirin EC  325 mg Oral Daily     insulin aspart  1-7 Units Subcutaneous TID AC     insulin aspart  1-5 Units Subcutaneous At Bedtime       Data     Recent Labs  Lab 10/30/17  0653 10/26/17  0650 10/25/17  0650    143 140   POTASSIUM 4.9 5.3 5.1   CHLORIDE 112* 113* 110*   CO2 24 22 23   BUN 42* 59* 63*   CR 1.35* 1.56* 1.79*   ANIONGAP 7 8 7   IDANIA 8.9 8.8 8.6   * 198* 148*       No results found for this or any previous visit (from the past 24 hour(s)).

## 2017-10-30 NOTE — PROGRESS NOTES
DAMIAN  I: DAMIAN spoke with Marv from [facility] who stated that Manan Benson is completing MA valentín. DAMIAN called Manan Benson ([phone])DAMIAN left message for Manan and awaits a call back. DAMIAN spoke with Dayami who stated she was working with [employer] (-P: [phone] F:[phone]) and that he had requested paperwork for patient. DAMIAN called Ludmila to update her on info and had to leave a voicemail. DAMIAN requested c/b. DAMIAN called [employer] and provided information that was requested. [employer] will fax form that MD needs to complete and DAMIAN will fax back. Damian received message from Manan Benson stating that he completed MA application the best he could and was in the process of getting bank statements. DAMIAN called and left message with Manan and requested a c/b. Damian called [facility] to update them. DAMIAN left message requesting a c/b.    P: DAMIAN will continue to follow and assist as needed.    ADDENDUM  I: DAMIAN spoke with Aarti Butler from [facility] and updated on status of patient's guardianship/MA Valentín. Aarti Butler was going to speak her director and update SW in AM as to whether they can accept patient with things moving forward. DAMIAN awaits an update in AM.    Gabrielle Sesay, MAU   *[phone]

## 2017-10-31 LAB
GLUCOSE BLDC GLUCOMTR-MCNC: 134 MG/DL (ref 70–99)
GLUCOSE BLDC GLUCOMTR-MCNC: 154 MG/DL (ref 70–99)
GLUCOSE BLDC GLUCOMTR-MCNC: 154 MG/DL (ref 70–99)
GLUCOSE BLDC GLUCOMTR-MCNC: 191 MG/DL (ref 70–99)
GLUCOSE BLDC GLUCOMTR-MCNC: 202 MG/DL (ref 70–99)

## 2017-10-31 PROCEDURE — A9270 NON-COVERED ITEM OR SERVICE: HCPCS | Mod: GY | Performed by: INTERNAL MEDICINE

## 2017-10-31 PROCEDURE — 96372 THER/PROPH/DIAG INJ SC/IM: CPT

## 2017-10-31 PROCEDURE — G0378 HOSPITAL OBSERVATION PER HR: HCPCS

## 2017-10-31 PROCEDURE — 25000132 ZZH RX MED GY IP 250 OP 250 PS 637: Performed by: INTERNAL MEDICINE

## 2017-10-31 PROCEDURE — 25000131 ZZH RX MED GY IP 250 OP 636 PS 637: Performed by: INTERNAL MEDICINE

## 2017-10-31 PROCEDURE — 25000132 ZZH RX MED GY IP 250 OP 250 PS 637: Mod: GY | Performed by: INTERNAL MEDICINE

## 2017-10-31 PROCEDURE — 00000146 ZZHCL STATISTIC GLUCOSE BY METER IP

## 2017-10-31 PROCEDURE — 99225 ZZC SUBSEQUENT OBSERVATION CARE,LEVEL II: CPT | Performed by: INTERNAL MEDICINE

## 2017-10-31 RX ADMIN — INSULIN GLARGINE 37 UNITS: 100 INJECTION, SOLUTION SUBCUTANEOUS at 22:21

## 2017-10-31 RX ADMIN — HYDRALAZINE HYDROCHLORIDE 10 MG: 10 TABLET ORAL at 22:21

## 2017-10-31 RX ADMIN — HYDRALAZINE HYDROCHLORIDE 10 MG: 10 TABLET ORAL at 08:00

## 2017-10-31 RX ADMIN — HYDRALAZINE HYDROCHLORIDE 10 MG: 10 TABLET ORAL at 16:39

## 2017-10-31 RX ADMIN — AMLODIPINE BESYLATE 10 MG: 10 TABLET ORAL at 08:00

## 2017-10-31 RX ADMIN — INSULIN ASPART 9 UNITS: 100 INJECTION, SOLUTION INTRAVENOUS; SUBCUTANEOUS at 17:41

## 2017-10-31 RX ADMIN — GUANFACINE 2 MG: 1 TABLET ORAL at 08:00

## 2017-10-31 RX ADMIN — MICONAZOLE NITRATE: 2 POWDER TOPICAL at 08:54

## 2017-10-31 RX ADMIN — INSULIN ASPART 9 UNITS: 100 INJECTION, SOLUTION INTRAVENOUS; SUBCUTANEOUS at 08:53

## 2017-10-31 RX ADMIN — HYDROCHLOROTHIAZIDE 12.5 MG: 12.5 CAPSULE ORAL at 08:00

## 2017-10-31 RX ADMIN — ASPIRIN 325 MG: 325 TABLET, DELAYED RELEASE ORAL at 08:00

## 2017-10-31 RX ADMIN — INSULIN ASPART 9 UNITS: 100 INJECTION, SOLUTION INTRAVENOUS; SUBCUTANEOUS at 12:59

## 2017-10-31 NOTE — PLAN OF CARE
Problem: Patient Care Overview  Goal: Plan of Care/Patient Progress Review  Outcome: No Change  Pt. disoriented to situation, time on shift; forgetful. Denies pain/nausea. HR jennifer in 50s at 55; asymptomatic. Other VSS. Reddened cristy area and under breasts, miconazole powder available. BGM check at [phone]. Up with SBA. Patient up to bathroom on shift, voiding adequately. Patient impulsive and sets off bed alarm; needs reminders to use call light. Discharge pending placement.

## 2017-10-31 NOTE — PLAN OF CARE
Problem: Confusion, Chronic (Adult)  Goal: Cognitive/Functional Impairments Minimized  Patient will demonstrate the desired outcomes by discharge/transition of care.   Outcome: Improving   Pt. disoriented to situation and time; forgetful. Denies pain/nausea. Hypertensive in AM, improved with scheduled meds.  Russ at times; asymptomatic. Miconazole powder applied to reddened cristy area and under breasts.  Insulin coverage given for BGMs.  Up with SBA.  Plan for discharge to TCU tomorrow.

## 2017-10-31 NOTE — PROGRESS NOTES
[facility]    Hospitalist Progress Note    Date of Service (when I saw the patient): 10/31/2017    Assessment & Plan   Hospital Cumulative Summary: Amber Hull is a 72 year old female who was admitted on 10/12/2017 her PMH is significant for hypertension, paroxysmal afib and DM with suspected cognitive decline and inability to care for herself who was admitted under observation on 10/12/2017 for evaluation after a fall which resulted in a scalp laceration. Patient was admitted for further evaluation and management.she got sutures for the scalp laceration. She was living the friend Dayami but due to overall decline in patient status , she does not think that she can continue to help patient care.she was evaluated by psych and was found to have significant memory impairment and did very poor on her MMS, her neurocognitive deficits were consistent with Alzheimer's dementia ,  and  are following closely for disposition.  During her stay she was started back on her insulin and insulin is adjusted accordingly she is also started back on her antihypertensive medications she seemed to have elevated creatinine and her Ace inhibitors are stopped. Currently she is doing well on the current regimen for HTN and diabetes and her creatinine is back to her baseline.She is also doing very well on the current psych med's and has not required any PRN medications for agitation since Oct 19 th.      Worsening cognitive function and inability to care for self, suspect Alzheimer's dementia :  Patient had been living with Dayami, a family friend. The patient does not have any relations. She has had gradual decline in her cognitive function suggestive of worsening dementia. Dayami thinks that this is the best she has been and says she is worse at times in terms of her memory and cognitive issues.  Despite this the patient continues to drive and is not willing to surrender her driving license  and stop driving. Dayami does not think she will be able to continue to help care for Ms. Hull. Noted to have scored poorly on the MMSE. Patient was evaluated by psychiatry as per their recommendation patient does not need commitment nor geriatric psych at this point. Per psych, does not need to pursue commitment as she does have POA for health care Ludmila Borges. She required Olanzapine on October 19.    - SW following, now applying for medicaid via state assistance, paper work was completed this morning   - prn Seroquel and prn Zyprexa for agitation ( not requiring as she is very stable on the current med's and with supervised environment )  - ambulate QID with assist     Hypertension  Paroxysmal atrial fibrillation :  Was supposed to have been taking lisinopril 20mg BID and metoprolol 75mg BID but had not been taking either of these before admission , now BP is improved on current medications. Was prescribed Apixaban 5mg BID which she has not been taking. Note her CHADs2 VASc score is 4, indicating a 4% yearly stroke risk. Discontinued metoprolol 10/29. She is not on lisinopril due to worsening renal functions and metoprolol for bradycardia.    - Continue Tenex t2 mg daily, HCTZ 12.5mg daily, and Norvasc 10 mg p.o. Daily  - continue hydralazine 10 mg po TID , if needed that can be increased in future for better blood pressure control.  - Full-dose Aspirin only given fall risk  - prn IV Hydralazine and Labetalol  - Depending upon her disposition plan, if patient is more in safe environment, may consider starting her back on Eliquis although she remains at high risk for fall       DM II, Uncontrolled: A1C 12.8 this stay. No known/reported complications from diabetes.Patient was supposed ot have been taking Lantus 30U daily but hasn't been taking it for some time now.  on presentation. Now much better on the current regimen.Expecting HgA1c to improve in the next 3 months     - Mod carb diet  - Lantus 37U HS    - Premeal Novolog to 9 units TID  - Medium SSI.      Suspected stage III CKD: Cr 1.42 on admission. Her baseline creatinine is around 1.4-1.6. Her creatinine went up when she was started back on her ACE inhibitor now she is off the lisinopril and received some fluids.  Her creatinine is back in normal range. Likely patient is unable to tolerate Ace inhibitors and the presence of her chronic kidney disease. Creatinine is in normal range       Fall and scalp laceration: This was likely a mechanical fall. She sustained a scalp laceration that was sutured in the ED. CT scan of her head was negative.  Received Tdap vaccine in the ED. Staples and suture removed by RN 10/23, healed   - continue local care     DVT Prophylaxis: Pneumatic Compression Devices    Code Status: Full Code    Disposition: patient is medically stable to be discharge , tentative discharge tomorrow to [facility].    Inessa Barton MD, [facility] medicine     Interval History   Patient was seen and examined, doing well, very excited about the possibility of discharge tomorrow, she knows a friend in [facility] and hoping to spend time with her  , she would like to meet  , she is denying any complaints this morning .    -Data reviewed today: I reviewed all new labs and imaging results over the last 24 hours. I personally reviewed no images or EKG's today.    Physical Exam   Temp: 97  F (36.1  C) Temp src: Oral BP: 135/71   Heart Rate: 50 Resp: 16 SpO2: 98 % O2 Device: None (Room air)    Vitals:    10/12/17 1835 10/12/17 2333   Weight: 90.7 kg (200 lb) 94.1 kg (207 lb 6.4 oz)     Vital Signs with Ranges  Temp:  [96.8  F (36  C)-97.1  F (36.2  C)] 97  F (36.1  C)  Heart Rate:  [50-66] 50  Resp:  [16] 16  BP: (135-173)/(58-82) 135/71  SpO2:  [96 %-98 %] 98 %  I/O last 3 completed shifts:  In: 600 [P.O.:600]  Out: -     Constitutional: Awake, alert, cooperative, no apparent distress, sitting in chair.  Respiratory: Clear to  auscultation bilaterally, no crackles or wheezing  Cardiovascular: Regular rate and rhythm, normal S1 and S2, and no murmur noted  GI: Normal bowel sounds, soft, non-distended, non-tender  Skin/Integumen: No rashes, no cyanosis, trace pretibial edema B/L    Medications        hydrALAZINE  10 mg Oral TID     insulin aspart  9 Units Subcutaneous TID w/meals     hydrochlorothiazide  12.5 mg Oral Daily     guanFACINE  2 mg Oral Daily     insulin glargine  37 Units Subcutaneous At Bedtime     amLODIPine  10 mg Oral Daily     aspirin EC  325 mg Oral Daily     insulin aspart  1-7 Units Subcutaneous TID AC     insulin aspart  1-5 Units Subcutaneous At Bedtime       Data     Recent Labs  Lab 10/30/17  0653 10/26/17  0650 10/25/17  0650    143 140   POTASSIUM 4.9 5.3 5.1   CHLORIDE 112* 113* 110*   CO2 24 22 23   BUN 42* 59* 63*   CR 1.35* 1.56* 1.79*   ANIONGAP 7 8 7   IDANIA 8.9 8.8 8.6   * 198* 148*       No results found for this or any previous visit (from the past 24 hour(s)).

## 2017-10-31 NOTE — PLAN OF CARE
Problem: Patient Care Overview  Goal: Plan of Care/Patient Progress Review  VSS. Started on TID hydralazine. Bradycardic at times, asymptomatic. Disoriented to situation and forgetful. Denies pain. Cristy area and area under breasts moist and have yeast rash present, miconazole powder applied. Turns off chair alarm. Impulsive at times, needs reminders to use call light. Was refusing to change brief, denying it was wet, eventually allowed NAZ to change and clean cristy-area, otherwise pleasant/cooperative with cares. Discharge pending placement.

## 2017-10-31 NOTE — PROGRESS NOTES
SW:    I: SW following for discharge planning. Pt will discharge to [facility] tomorrow 11/1/17. Pt has dementia diagnosis and will require stretcher transport due to the need for supervision to ensure safety. PCS form completed and will be faxed to  billing, original along with facesheet to be given to HE  at discharge.    P: SW to follow. Pt to discharge to [facility] via  stretcher transport at 1300 on 11/1/17.    PAS-RR    D: Per DHS regulation, SW completed and submitted PAS-RR to [place] [employer] Direct Connect via the Senior LinkAge Line.  PAS-RR confirmation # is : [id number]    I: DAMIAN spoke with pt and they are aware a PAS-RR has been submitted.  DAMIAN reviewed with pt that they may be contacted for a follow up appointment within 10 days of hospital discharge if their SNF stay is < 30 days.  Contact information for [facility] LinkAge Line was also provided.    A: Pt verbalized understanding.    P: Further questions may be directed to [facility] Line at #[phone], option #4 for PAS-RR staff.      HOLLY Lopez, [facility]  Daytime (8:00am-4:30pm): [phone]  After-Hours SW Pager (4:30pm-11:30pm): [phone]

## 2017-10-31 NOTE — PROGRESS NOTES
DAMIAN  I: DAMIAN spoke with Aarti Butler from [facility] and she stated that patient can be admitted to their LTC facility tomorrow. SW sent referral and awaits confirmation. SW will arrange transport for patient. SW will complete PAS and send orders when complete.    P: SW will continue to follow and assist as needed.    SW updated patient/POA about patient's d/c tomorrow. Patient and POA are on board for d/c to [facility]. SW will updated facility on transport time.    Gabrielle Sesay, MAU   *[phone]

## 2017-11-01 VITALS
RESPIRATION RATE: 16 BRPM | OXYGEN SATURATION: 97 % | WEIGHT: 207.4 LBS | BODY MASS INDEX: 34.55 KG/M2 | HEIGHT: 65 IN | TEMPERATURE: 96.5 F | SYSTOLIC BLOOD PRESSURE: 175 MMHG | HEART RATE: 53 BPM | DIASTOLIC BLOOD PRESSURE: 70 MMHG

## 2017-11-01 LAB
GLUCOSE BLDC GLUCOMTR-MCNC: 164 MG/DL (ref 70–99)
GLUCOSE BLDC GLUCOMTR-MCNC: 167 MG/DL (ref 70–99)
GLUCOSE BLDC GLUCOMTR-MCNC: 170 MG/DL (ref 70–99)

## 2017-11-01 PROCEDURE — A9270 NON-COVERED ITEM OR SERVICE: HCPCS | Mod: GY | Performed by: INTERNAL MEDICINE

## 2017-11-01 PROCEDURE — 96372 THER/PROPH/DIAG INJ SC/IM: CPT

## 2017-11-01 PROCEDURE — 25000132 ZZH RX MED GY IP 250 OP 250 PS 637: Mod: GY | Performed by: INTERNAL MEDICINE

## 2017-11-01 PROCEDURE — 40000893 ZZH STATISTIC PT IP EVAL DEFER: Performed by: PHYSICAL THERAPIST

## 2017-11-01 PROCEDURE — 00000146 ZZHCL STATISTIC GLUCOSE BY METER IP

## 2017-11-01 PROCEDURE — 99217 ZZC OBSERVATION CARE DISCHARGE: CPT | Performed by: INTERNAL MEDICINE

## 2017-11-01 PROCEDURE — G0378 HOSPITAL OBSERVATION PER HR: HCPCS

## 2017-11-01 RX ORDER — HYDROCHLOROTHIAZIDE 12.5 MG/1
12.5 CAPSULE ORAL DAILY
Qty: 30 CAPSULE | DISCHARGE
Start: 2017-11-01

## 2017-11-01 RX ORDER — ASPIRIN 325 MG
325 TABLET, DELAYED RELEASE (ENTERIC COATED) ORAL DAILY
Qty: 40 TABLET | DISCHARGE
Start: 2017-11-01

## 2017-11-01 RX ORDER — HYDRALAZINE HYDROCHLORIDE 10 MG/1
10 TABLET, FILM COATED ORAL 3 TIMES DAILY
Qty: 60 TABLET | DISCHARGE
Start: 2017-11-01

## 2017-11-01 RX ORDER — GUANFACINE 2 MG/1
2 TABLET ORAL DAILY
DISCHARGE
Start: 2017-11-01

## 2017-11-01 RX ORDER — AMLODIPINE BESYLATE 10 MG/1
10 TABLET ORAL DAILY
Qty: 30 TABLET | DISCHARGE
Start: 2017-11-01

## 2017-11-01 RX ADMIN — INSULIN ASPART 9 UNITS: 100 INJECTION, SOLUTION INTRAVENOUS; SUBCUTANEOUS at 08:24

## 2017-11-01 RX ADMIN — ASPIRIN 325 MG: 325 TABLET, DELAYED RELEASE ORAL at 08:22

## 2017-11-01 RX ADMIN — HYDRALAZINE HYDROCHLORIDE 10 MG: 10 TABLET ORAL at 08:23

## 2017-11-01 RX ADMIN — AMLODIPINE BESYLATE 10 MG: 10 TABLET ORAL at 08:23

## 2017-11-01 RX ADMIN — INSULIN ASPART 9 UNITS: 100 INJECTION, SOLUTION INTRAVENOUS; SUBCUTANEOUS at 12:10

## 2017-11-01 RX ADMIN — HYDROCHLOROTHIAZIDE 12.5 MG: 12.5 CAPSULE ORAL at 08:23

## 2017-11-01 RX ADMIN — GUANFACINE 2 MG: 1 TABLET ORAL at 08:22

## 2017-11-01 NOTE — PROGRESS NOTES
DAMIAN  I: DAMIAN faxed PT notes to [facility]. Patient has transport arranged at 1300. DAMIAN will fax orders/PAS/scripts when complete. DAMIAN updated Marv ([facility] worker) with transport information. DAMIAN also spoke with Dayami and updated her on d/c info.    -Orders sent via DOD  -PAS faxed    P: DAMIAN will continue to follow and assist as needed.    Gabrielle Sesay, JASONW   *[phone]

## 2017-11-01 NOTE — PLAN OF CARE
"Problem: Patient Care Overview  Goal: Plan of Care/Patient Progress Review  PT: PT orders received. Pt admitted 10/12 under observation status after unwitnessed fall. Per chart review, Dayami whom pt lives with stated pt could \"hardly walk\". Per discussion with nursing, pt is unsteady on feet at times and demonstrates decreased safety awareness. Noting pt with plans to discharge to LTC facility this afternoon. Appears pt would benefit from skilled PT at LTC to progress functional strength, balance, safety and independence with functional mobility. Will defer PT to LTC at this time as pt is likely leaving today. Discussed with CC and RN who are in agreement with plan to defer PT to LTC. Orders completed.       "

## 2017-11-01 NOTE — DISCHARGE SUMMARY
[facility]    Discharge Summary  Hospitalist    Date of Admission:  10/12/2017  Date of Discharge:  11/1/2017  Discharging Provider: Inessa Barton MD,FACP    Discharge Diagnoses     Active Problems:    Fall  Worsening cognitive function and inability to care for self, suspect Alzheimer's dementia  Hypertension  Paroxysmal atrial fibrillation     DM II, Uncontrolled    Suspected stage III CKD  Fall and scalp laceration    History of Present Illness   Amber Hull is an 72 year old female who was brought to the Emergency Department for evaluation after a fall in which she sustained a scalp laceration. The patient was very confused.  According to the patient's friend Dayami Hull was noted to have a head laceration which was bleeding, and 911 was called.  EMS brought her to the hospital.patient was home alone, and the fall was unwitnessed. patient lives with Dayami who is the daughter of a friend of the patient. patient does not have any family members. She has been living with Dayami for more than 15 years.  For the last several months she has had gradually worsening memory issues and was in the Emergency Department in November 2016 after she was found walking outside in the cold without shoes.      Hospital Course    Amber Hull is a 72 year old female who was admitted on 10/12/2017 her PMH is significant for hypertension, paroxysmal afib and DM with suspected cognitive decline and inability to care for herself who was admitted under observation on 10/12/2017 for evaluation after a fall which resulted in a scalp laceration.     Patient was admitted for further evaluation and management.she got sutures for the scalp laceration. She was living the friend Dayami but due to overall decline in patient status , she does not think that she can continue to help patient care.she was evaluated by psych and was found to have significant memory impairment and did very poor on her MMS, her  neurocognitive deficits were consistent with Alzheimer's dementia ,  and  are following closely for disposition.     During her stay she was started back on her insulin and insulin is adjusted accordingly she is also started back on her antihypertensive medications she seemed to have elevated creatinine and her Ace inhibitors were stopped. Currently she is doing well on the current regimen for HTN and diabetes and her creatinine is back to her baseline.She is also doing very well in the supervised environment and has not required any PRN medications for agitation since Oct 19 th.    For her Hypertension and Paroxysmal atrial fibrillation : Was supposed to have been taking lisinopril 20mg BID and metoprolol 75mg BID but had not been taking either of these before admission , Was prescribed Apixaban 5mg BID which she has not been taking. Note her CHADs2 VASc score is 4, indicating a 4% yearly stroke risk. Discontinued metoprolol 10/29. She is not on lisinopril due to worsening renal functions and metoprolol for bradycardia.     - Continue Tenex 2 mg daily, HCTZ 12.5mg daily, and Norvasc 10 mg p.o. Daily, if creatinine worsenes , take her off HCTZ.  - continue hydralazine 10 mg po TID , if needed that can be increased in future for better blood pressure control.  - Full-dose Aspirin only given fall risk for anticoagulation  - Depending upon her status in future, if patient is more in safe environment, and has no fall ,may consider starting her back on Eliquis although she remains at high risk for fall       DM II, Uncontrolled: A1C 12.8 this stay. No known/reported complications from diabetes.Patient was supposed ot have been taking Lantus 30U daily but hasn't been taking it for some time now.  on presentation. Now much better on the current regimen.Expecting HgA1c to improve in the next 3 months      - Mod carb diet  - Lantus 37U HS   - Premeal Novolog to 9 units TID  - Medium  SSI.      Suspected stage III CKD:  Her creatinine went up when she was started back on her ACE inhibitor now she is off the lisinopril and received some fluids.  Her creatinine is back in normal range. Likely patient is unable to tolerate Ace inhibitors and the presence of her chronic kidney disease. Creatinine is in normal range     Patient was seen and examined on the day of discharge , she is feeling well, does not have any complaints , I did review the discharge medications and instructions with the patient and plan for her to follow up with the PCP after the hospitalization .patient was in agreement ,she is discharged in stable condition to [facility].    Inessa Barton MD, FACP    Significant Results and Procedures   As below    Pending Results     NONE    Unresulted Labs Ordered in the Past 30 Days of this Admission     No orders found from 8/13/2017 to 10/13/2017.          Code Status   Full Code       Primary Care Physician   [facility]    Physical Exam   Temp: 96.9  F (36.1  C) Temp src: Oral BP: 137/77 Pulse: 53 Heart Rate: 59 Resp: 16 SpO2: 98 % O2 Device: None (Room air)    Vitals:    10/12/17 1835 10/12/17 2333   Weight: 90.7 kg (200 lb) 94.1 kg (207 lb 6.4 oz)     Vital Signs with Ranges  Temp:  [96.9  F (36.1  C)-97.3  F (36.3  C)] 96.9  F (36.1  C)  Pulse:  [53] 53  Heart Rate:  [50-66] 59  Resp:  [16] 16  BP: (135-175)/(64-85) 137/77  SpO2:  [94 %-98 %] 98 %  I/O last 3 completed shifts:  In: 840 [P.O.:840]  Out: -     Constitutional: Awake, alert, cooperative, no apparent distress  Respiratory: Clear to auscultation bilaterally, no crackles or wheezing  Cardiovascular: Regular rate and rhythm, normal S1 and S2, and no murmur noted  GI: Normal bowel sounds, soft, non-distended, non-tender  Skin/Integumen: No rashes, no cyanosis, no edema      Discharge Disposition   Discharged to long-term care facility  Condition at discharge: Stable    Consultations This Hospital Stay   SOCIAL WORK IP  CONSULT  PHYSICAL THERAPY ADULT IP CONSULT  PSYCHIATRY IP CONSULT  OCCUPATIONAL THERAPY ADULT IP CONSULT  PSYCHIATRY IP CONSULT  PSYCHIATRY IP CONSULT  PSYCHIATRY IP CONSULT  PHYSICAL THERAPY ADULT IP CONSULT  PHYSICAL THERAPY ADULT IP CONSULT    Time Spent on this Encounter   I, Inessa Hitchcockbal, personally saw the patient today and spent greater than 30 minutes discharging this patient.    Discharge Orders     General info for SNF   Length of Stay Estimate: Long Term Care  Condition at Discharge: Improving  Level of care:skilled   Rehabilitation Potential: Good  Admission H&P remains valid and up-to-date: Yes  Recent Chemotherapy: N/A  Use Nursing Home Standing Orders: Yes     Mantoux instructions   Give two-step Mantoux (PPD) Per Facility Policy Yes     Reason for your hospital stay   You were admitted to the hospital secondary to confusion.     Glucose monitor nursing POCT   Before meals and at bedtime     Additional Discharge Instructions   yopur blood pressure medications are changed . You are not taking lisinopril and metoprolol any more.  You are discharged on Norvasc, HCTZ and hydralazine along with Bumex .  Your Lantus is increased to 37 units from 30 units.  You are off the Apixaban and will be taking full dose ASA for atrial fibrillation .     Activity - Up ad angie     Full Code     Physical Therapy Adult Consult   Evaluate and treat as clinically indicated.    Reason: debilitation and balance issues , risk of fall.     Fall precautions     Advance Diet as Tolerated   Follow this diet upon discharge: Orders Placed This Encounter     Room Service     Moderate Consistent CHO Diet       Discharge Medications   Current Discharge Medication List      START taking these medications    Details   aspirin  MG EC tablet Take 1 tablet (325 mg) by mouth daily  Qty: 40 tablet    Associated Diagnoses: Paroxysmal atrial fibrillation (H)      !! insulin aspart (NOVOLOG PEN) 100 UNIT/ML injection Inject 9 Units  Subcutaneous 3 times daily (with meals)    Associated Diagnoses: Type 2 diabetes mellitus with diabetic nephropathy, with long-term current use of insulin (H)      !! insulin aspart (NOVOLOG PEN) 100 UNIT/ML injection Inject 1-7 Units Subcutaneous 3 times daily (before meals)    Associated Diagnoses: Type 2 diabetes mellitus with diabetic nephropathy, with long-term current use of insulin (H)      guanFACINE (TENEX) 2 MG tablet Take 1 tablet (2 mg) by mouth daily    Associated Diagnoses: Benign essential hypertension      hydrALAZINE (APRESOLINE) 10 MG tablet Take 1 tablet (10 mg) by mouth 3 times daily  Qty: 60 tablet    Associated Diagnoses: Benign essential hypertension      amLODIPine (NORVASC) 10 MG tablet Take 1 tablet (10 mg) by mouth daily  Qty: 30 tablet    Associated Diagnoses: Benign essential hypertension      miconazole (MICATIN; MICRO GUARD) 2 % powder Apply topically every hour as needed for other (topical candidiasis)    Associated Diagnoses: Candidiasis of skin      hydrochlorothiazide (MICROZIDE) 12.5 MG capsule Take 1 capsule (12.5 mg) by mouth daily  Qty: 30 capsule    Associated Diagnoses: Benign essential hypertension       !! - Potential duplicate medications found. Please discuss with provider.      CONTINUE these medications which have CHANGED    Details   insulin glargine (LANTUS) 100 UNIT/ML injection Inject 37 Units Subcutaneous At Bedtime    Associated Diagnoses: Type 2 diabetes mellitus with diabetic nephropathy, with long-term current use of insulin (H)         CONTINUE these medications which have NOT CHANGED    Details   acetaminophen (TYLENOL) 325 MG tablet Take 650 mg by mouth 3 times daily as needed for mild pain      polyethylene glycol (MIRALAX/GLYCOLAX) powder Take 17 g by mouth daily as needed for constipation      ATORVASTATIN CALCIUM PO Take 10 mg by mouth daily          STOP taking these medications       Metoprolol Tartrate 75 MG TABS Comments:   Reason for Stopping:          apixaban ANTICOAGULANT (ELIQUIS) 5 MG tablet Comments:   Reason for Stopping:         LISINOPRIL PO Comments:   Reason for Stopping:             Allergies   Allergies   Allergen Reactions     No Known Allergies      Data   Most Recent 3 CBC's:  Recent Labs   Lab Test  10/22/17   0741  10/13/17   0725  10/12/17   2000  11/22/16   1740   WBC  5.5   --   9.4  8.9   HGB  13.0  13.4  14.6  13.9   MCV  90   --   86  84   PLT  239   --   270  281      Most Recent 3 BMP's:  Recent Labs   Lab Test  10/30/17   0653  10/26/17   0650  10/25/17   0650   NA  143  143  140   POTASSIUM  4.9  5.3  5.1   CHLORIDE  112*  113*  110*   CO2  24  22  23   BUN  42*  59*  63*   CR  1.35*  1.56*  1.79*   ANIONGAP  7  8  7   IDANIA  8.9  8.8  8.6   GLC  123*  198*  148*     Most Recent 2 LFT's:  Recent Labs   Lab Test  11/22/16   1740  06/10/16   0550   06/01/16   0652   AST  22  15   < >  22   ALT  27   --    --   13   ALKPHOS  198*   --    --   182*   BILITOTAL  0.4   --    --   0.4    < > = values in this interval not displayed.     Most Recent INR's and Anticoagulation Dosing History:  Anticoagulation Dose History     Recent Dosing and Labs Latest Ref Rng & Units 5/28/2016 10/12/2017 10/13/2017    INR 0.86 - 1.14 1.28(H) 0.97 1.04        Most Recent 3 Troponin's:No lab results found.  Most Recent Cholesterol Panel:No lab results found.  Most Recent 6 Bacteria Isolates From Any Culture (See EPIC Reports for Culture Details):  Recent Labs   Lab Test  10/12/17   2131  05/28/16   0610  05/28/16   0535  05/28/16   0516   CULT  50,000 to 100,000 colonies/mL  Citrobacter freundii complex  *  Cultured on the 1st day of incubation: Beta hemolytic Streptococcus group B  Susceptibility testing done on previous specimen  Critical Value/Significant Value, preliminary result only, called to and read   back by Loena Reinoso RN, @ 0044 05.29.2016 BL  *  Cultured on the 1st day of incubation: Beta hemolytic Streptococcus group B  Critical Value/Significant  Value, preliminary result only, called to and read   back by  Stefany Lux RN @2207 5/28/16. CT  (Note)  POSITIVE for STREPTOCOCCUS AGALACTIAE (Group B Strep) by Netsket  multiplex nucleic acid test. Penicillin and ampicillin are drugs of  choice, nonsusceptible isolates have not been reported. Final  identification and antimicrobial susceptibility testing will be  verified by standard methods.    Specimen tested with Verigene multiplex, gram-positive blood culture  nucleic acid test for the following targets: Staph aureus, Staph  epidermidis, Staph lugdunensis, other Staph species, Enterococcus  faecalis, Enterococcus faecium, Streptococcus species, S. agalactiae,  S. anginosus grp., S. pneumoniae, S. pyogenes, Listeria sp., mecA  (methicillin resistance) and Bobby/B (vancomycin resistance).    Critical Value/Significant Value called to and read back by Leona Reinoso RN, @ 0044 05 .29.2016 BL  *  >100,000 colonies/mL Beta hemolytic Streptococcus group B Group B Streptococci   are susceptible to ampicillin, penicillin, and cefazolin, but may be   erythromycin and/or clindamycin resistant. Contact Microbiology if your patient   is allergic to penicillin and you need erythromycin and/or clindamycin testing   to be performed. Clindamycin and Erythromycin are not routinely prescribed for   isolates from the urinary tract. Susceptibility testing must be requested within   5 days.  10,000 to 50,000 colonies/mL Candida albicans / dubliniensis Candida albicans and   Candida dubliniensis are not routinely speciated Susceptibility testing not   routinely done  *     Most Recent TSH, T4 and A1c Labs:  Recent Labs   Lab Test  10/13/17   0725   A1C  12.8*     Results for orders placed or performed during the hospital encounter of 10/12/17   Head CT w/o contrast    Narrative    CT HEAD W/O CONTRAST   10/12/2017 7:02 PM     HISTORY: check for traumatic bleed or skull fracture or scalp wound  FB, fell and hit head with large  right parietal scalp laceration    TECHNIQUE: Axial images of the head without IV contrast material.  Radiation dose for this scan was reduced using automated exposure  control, adjustment of the mA and/or kV according to patient size, or  iterative reconstruction technique.    COMPARISON: CT dated 11/22/2016    FINDINGS:  There is generalized atrophy of the brain.  Areas of low  attenuation are present in the white matter of the cerebral  hemispheres that are consistent with small vessel ischemic disease in  this age patient. Again noted is a calcified or ossified meningioma  versus an osteoma off the inner table of the skull in the right  parietal region. This is unchanged.. Left sphenoid sinus is now  completely opacified. There is sclerosis and thickening of the walls  of the sinus consistent with chronic osteitis.. Soft tissue swelling  is seen of the right frontal region. Small amount of subcutaneous  gas..      Impression    IMPRESSION:   1. No intracranial hemorrhage or skull fractures are seen.  2. Age related changes including diffuse brain atrophy.  White matter  changes consistent with small vessel ischemic disease.    DANYELLE VALDEZ MD

## 2017-11-01 NOTE — PLAN OF CARE
Problem: Patient Care Overview  Goal: Plan of Care/Patient Progress Review  Outcome: No Change  Pt is A&Ox4. VSS except elevated BP of 167/80, bradycardic at times. 0200 . Pt appeared to rest comfortably throughout the night, got up x1 to bathroom SBA. Plan is to d/c today at 1300 via stretcher to [place]. Will continue to monitor.

## 2018-08-15 ENCOUNTER — HOSPITAL ENCOUNTER (INPATIENT)
Facility: CLINIC | Age: 73
LOS: 4 days | Discharge: NURSING, REHAB OR LONG-TERM CARE | DRG: 871 | End: 2018-08-20
Attending: EMERGENCY MEDICINE | Admitting: INTERNAL MEDICINE
Payer: MEDICARE

## 2018-08-15 DIAGNOSIS — K59.01 SLOW TRANSIT CONSTIPATION: ICD-10-CM

## 2018-08-15 DIAGNOSIS — E87.70 HYPERVOLEMIA, UNSPECIFIED HYPERVOLEMIA TYPE: ICD-10-CM

## 2018-08-15 DIAGNOSIS — L03.119 CELLULITIS OF LOWER EXTREMITY, UNSPECIFIED LATERALITY: ICD-10-CM

## 2018-08-15 DIAGNOSIS — E78.2 MIXED HYPERLIPIDEMIA: Primary | ICD-10-CM

## 2018-08-15 LAB
ALBUMIN SERPL-MCNC: 2.3 G/DL (ref 3.4–5)
ALBUMIN UR-MCNC: 300 MG/DL
ALP SERPL-CCNC: 215 U/L (ref 40–150)
ALT SERPL W P-5'-P-CCNC: 20 U/L (ref 0–50)
ANION GAP SERPL CALCULATED.3IONS-SCNC: 10 MMOL/L (ref 3–14)
APPEARANCE UR: CLEAR
AST SERPL W P-5'-P-CCNC: 23 U/L (ref 0–45)
BASOPHILS # BLD AUTO: 0 10E9/L (ref 0–0.2)
BASOPHILS NFR BLD AUTO: 0.1 %
BILIRUB SERPL-MCNC: 0.4 MG/DL (ref 0.2–1.3)
BILIRUB UR QL STRIP: NEGATIVE
BUN SERPL-MCNC: 29 MG/DL (ref 7–30)
CALCIUM SERPL-MCNC: 8.7 MG/DL (ref 8.5–10.1)
CHLORIDE SERPL-SCNC: 108 MMOL/L (ref 94–109)
CO2 SERPL-SCNC: 20 MMOL/L (ref 20–32)
COLOR UR AUTO: ABNORMAL
CREAT SERPL-MCNC: 1.4 MG/DL (ref 0.52–1.04)
DIFFERENTIAL METHOD BLD: ABNORMAL
EOSINOPHIL # BLD AUTO: 0.1 10E9/L (ref 0–0.7)
EOSINOPHIL NFR BLD AUTO: 0.7 %
ERYTHROCYTE [DISTWIDTH] IN BLOOD BY AUTOMATED COUNT: 15.7 % (ref 10–15)
GFR SERPL CREATININE-BSD FRML MDRD: 37 ML/MIN/1.7M2
GLUCOSE SERPL-MCNC: 120 MG/DL (ref 70–99)
GLUCOSE UR STRIP-MCNC: 70 MG/DL
HCT VFR BLD AUTO: 31 % (ref 35–47)
HGB BLD-MCNC: 10 G/DL (ref 11.7–15.7)
HGB UR QL STRIP: ABNORMAL
IMM GRANULOCYTES # BLD: 0.1 10E9/L (ref 0–0.4)
IMM GRANULOCYTES NFR BLD: 0.7 %
KETONES UR STRIP-MCNC: NEGATIVE MG/DL
LACTATE BLD-SCNC: 0.7 MMOL/L (ref 0.7–2)
LEUKOCYTE ESTERASE UR QL STRIP: NEGATIVE
LYMPHOCYTES # BLD AUTO: 1.7 10E9/L (ref 0.8–5.3)
LYMPHOCYTES NFR BLD AUTO: 10.7 %
MCH RBC QN AUTO: 28 PG (ref 26.5–33)
MCHC RBC AUTO-ENTMCNC: 32.3 G/DL (ref 31.5–36.5)
MCV RBC AUTO: 87 FL (ref 78–100)
MONOCYTES # BLD AUTO: 0.9 10E9/L (ref 0–1.3)
MONOCYTES NFR BLD AUTO: 5.7 %
MUCOUS THREADS #/AREA URNS LPF: PRESENT /LPF
NEUTROPHILS # BLD AUTO: 13.3 10E9/L (ref 1.6–8.3)
NEUTROPHILS NFR BLD AUTO: 82.1 %
NITRATE UR QL: NEGATIVE
NRBC # BLD AUTO: 0 10*3/UL
NRBC BLD AUTO-RTO: 0 /100
PH UR STRIP: 7 PH (ref 5–7)
PLATELET # BLD AUTO: 288 10E9/L (ref 150–450)
POTASSIUM SERPL-SCNC: 5 MMOL/L (ref 3.4–5.3)
PROT SERPL-MCNC: 7.1 G/DL (ref 6.8–8.8)
RBC # BLD AUTO: 3.57 10E12/L (ref 3.8–5.2)
RBC #/AREA URNS AUTO: 1 /HPF (ref 0–2)
SODIUM SERPL-SCNC: 138 MMOL/L (ref 133–144)
SOURCE: ABNORMAL
SP GR UR STRIP: 1.01 (ref 1–1.03)
SQUAMOUS #/AREA URNS AUTO: 1 /HPF (ref 0–1)
UROBILINOGEN UR STRIP-MCNC: NORMAL MG/DL (ref 0–2)
WBC # BLD AUTO: 16.2 10E9/L (ref 4–11)
WBC #/AREA URNS AUTO: 1 /HPF (ref 0–5)

## 2018-08-15 PROCEDURE — 85025 COMPLETE CBC W/AUTO DIFF WBC: CPT | Performed by: EMERGENCY MEDICINE

## 2018-08-15 PROCEDURE — 25000128 H RX IP 250 OP 636: Performed by: EMERGENCY MEDICINE

## 2018-08-15 PROCEDURE — 81001 URINALYSIS AUTO W/SCOPE: CPT | Performed by: EMERGENCY MEDICINE

## 2018-08-15 PROCEDURE — 80053 COMPREHEN METABOLIC PANEL: CPT | Performed by: EMERGENCY MEDICINE

## 2018-08-15 PROCEDURE — 96361 HYDRATE IV INFUSION ADD-ON: CPT

## 2018-08-15 PROCEDURE — 80307 DRUG TEST PRSMV CHEM ANLYZR: CPT | Performed by: PHYSICIAN ASSISTANT

## 2018-08-15 PROCEDURE — 83605 ASSAY OF LACTIC ACID: CPT | Performed by: EMERGENCY MEDICINE

## 2018-08-15 PROCEDURE — 99285 EMERGENCY DEPT VISIT HI MDM: CPT

## 2018-08-15 RX ORDER — CEFTRIAXONE 2 G/1
2 INJECTION, POWDER, FOR SOLUTION INTRAMUSCULAR; INTRAVENOUS ONCE
Status: COMPLETED | OUTPATIENT
Start: 2018-08-15 | End: 2018-08-16

## 2018-08-15 RX ADMIN — SODIUM CHLORIDE 500 ML: 9 INJECTION, SOLUTION INTRAVENOUS at 22:06

## 2018-08-15 NOTE — IP AVS SNAPSHOT
KurtisAmber cooper #[id number] (CSN:[id number])  (73 year old F)  (Adm: 08/15/18)     [id number]               [place] [address] ONCOLOGY: [phone]            Patient Demographics     Patient Name Sex          Age SSN Address Phone    Amber Hull Female 1945 (73 year old) [id number] [place] [address] [phone] (Home)  none (Work)  [phone] (Mobile)      Emergency Contact(s)     Name Relation Home Work Mobile    Ludmila Borges Other [phone]      Tasia Olivares Friend [phone] none none    Dayami Muñoz Friend [phone]       Admission Information     Attending Provider Admitting Provider Admission Type Admission Date/Time    Geoff Saldana MD Shana, Geoff Osuna MD Emergency 08/15/18  2110    Discharge Date Hospital Service Auth/Cert Status Service Area     Hospitalist [facility]    Unit Room/Bed Admission Status       [facility] ONCOLOGY  Admission (Confirmed)       Admission     Complaint    Cellulitis      Hospital Account     Name Acct ID Class Status Primary Coverage    KurtisJenniferdiana HILLIARD [id number] Inpatient Open MEDICARE - MEDICARE            Guarantor Account (for Hospital Account #[id number])     Name Relation to Pt Service Area Active? Acct Type    Amber Hull  FCS Yes Personal/Family    Address Phone          [address] [address] [phone](H)  none(O)              Coverage Information (for Hospital Account #[id number])     1. MEDICARE/MEDICARE     F/O Payor/Plan Precert #    MEDICARE/MEDICARE     Subscriber Subscriber #    Amber Hull [id number]    Address Phone    ATTN CLAIMS  [address]  [facility] [place] [address] [phone]          2. [employer]ERS/[employer] [place] [place]     F/O Payor/Plan Precert #    HEALTH[employer]NERS/[employer] [place]ARE [place]     Subscriber Subscriber #    Amber Hull [id number]    Address Phone    [address]  [place]  "[address] [phone]                                                      INTERAGENCY TRANSFER FORM - PHYSICIAN ORDERS   8/15/2018                       [place] [address] ONCOLOGY: [phone]            Attending Provider: Geoff Saldana MD     Allergies:  No Known Allergies    Infection:  None   Service:  HOSPITALIST    Ht:  1.6 m (5' 3\")   Wt:  117.5 kg (259 lb)   Admission Wt:  90.7 kg (200 lb)    BMI:  45.88 kg/m 2   BSA:  2.29 m 2            ED Clinical Impression     Diagnosis Description Comment Added By Time Added    Cellulitis of lower extremity, unspecified laterality [L03.119] Cellulitis of lower extremity, unspecified laterality [L03.119]  Aristides Carrasco MD 8/15/2018 11:49 PM      Hospital Problems as of 8/20/2018              Priority Class Noted POA    Cellulitis Medium  8/16/2018 Yes      Non-Hospital Problems as of 8/20/2018              Priority Class Noted    Pain in joint, multiple sites Medium  11/1/2007    Obesity Medium  11/1/2007    Type 2 diabetes mellitus with diabetic nephropathy (H) Medium  5/28/2016    Benign essential hypertension Medium  5/28/2016    Mixed hyperlipidemia Medium  5/28/2016    Paroxysmal atrial fibrillation (H) Medium  5/28/2016    Ureteral stone Medium  5/28/2016    Hydronephrosis, unspecified hydronephrosis type Medium  5/28/2016    Acute kidney failure (H) Medium  5/28/2016    Ureteral calculus Medium  5/28/2016    SIRS (systemic inflammatory response syndrome) (H) Medium  5/28/2016    Septicemia due to group B Streptococcus (H) High  5/29/2016    Fall Medium  10/12/2017      Code Status History     Date Active Date Inactive Code Status Order ID Comments User Context    8/20/2018 11:40 AM  Full Code [id number]  Ying Conner MD Outpatient    8/16/2018  3:25 AM 8/20/2018 11:40 AM Full Code [id number]  Geoff Saldana MD Inpatient    11/1/2017  8:37 AM 8/16/2018  3:25 AM Full Code [id number]  Inessa Barton MD Outpatient    " 10/12/2017 11:32 PM 11/1/2017  8:37 AM Full Code [id number]  Rj Peres MD ED    6/1/2016  1:05 PM 10/12/2017 11:32 PM DNR [id number]  Dg Collazo MD Outpatient    5/28/2016  1:57 PM 6/1/2016  1:05 PM DNR [id number]  Jc Cowan MD Inpatient      Current Code Status     Date Active Code Status Order ID Comments User Context       Prior      Summary of Visit     Reason for your hospital stay       Cellulitis from venous stasis/fluid overload                Medication Review      START taking        Dose / Directions Comments    amoxicillin-clavulanate 875-125 MG per tablet   Commonly known as:  AUGMENTIN        Dose:  1 tablet   Take 1 tablet by mouth 2 times daily   Quantity:  20 tablet   Refills:  0        senna-docusate 8.6-50 MG per tablet   Commonly known as:  SENOKOT-S;PERICOLACE   Used for:  Slow transit constipation        Dose:  1 tablet   Take 1 tablet by mouth 2 times daily as needed for constipation   Quantity:  100 tablet   Refills:  0          CONTINUE these medications which may have CHANGED, or have new prescriptions. If we are uncertain of the size of tablets/capsules you have at home, strength may be listed as something that might have changed.        Dose / Directions Comments    atorvastatin 10 MG tablet   Commonly known as:  LIPITOR   This may have changed:  Another medication with the same name was removed. Continue taking this medication, and follow the directions you see here.   Used for:  Mixed hyperlipidemia        Dose:  10 mg   Start taking on:  8/21/2018   Take 1 tablet (10 mg) by mouth daily   Refills:  0        furosemide 40 MG tablet   Commonly known as:  LASIX   This may have changed:  These instructions start on 8/22/2018. If you are unsure what to do until then, ask your doctor or other care provider.   Used for:  Hypervolemia, unspecified hypervolemia type        Dose:  40 mg   Start taking on:  8/22/2018   Take 1 tablet (40 mg) by mouth 2 times daily    Quantity:  30 tablet   Refills:  0    Start after 2 days         CONTINUE these medications which have NOT CHANGED        Dose / Directions Comments    acetaminophen 325 MG tablet   Commonly known as:  TYLENOL        Dose:  650 mg   Take 650 mg by mouth 3 times daily as needed for mild pain   Refills:  0        amLODIPine 10 MG tablet   Commonly known as:  NORVASC   Used for:  Benign essential hypertension        Dose:  10 mg   Take 1 tablet (10 mg) by mouth daily   Quantity:  30 tablet   Refills:  0        aspirin 325 MG EC tablet   Used for:  Paroxysmal atrial fibrillation (H)        Dose:  325 mg   Take 1 tablet (325 mg) by mouth daily   Quantity:  40 tablet   Refills:  0        citalopram 10 MG tablet   Commonly known as:  celeXA        Dose:  10 mg   Take 10 mg by mouth daily   Refills:  0        guanFACINE 2 MG tablet   Commonly known as:  TENEX   Used for:  Benign essential hypertension        Dose:  2 mg   Take 1 tablet (2 mg) by mouth daily   Refills:  0        hydrALAZINE 10 MG tablet   Commonly known as:  APRESOLINE   Used for:  Benign essential hypertension        Dose:  10 mg   Take 1 tablet (10 mg) by mouth 3 times daily   Quantity:  60 tablet   Refills:  0        hydrochlorothiazide 12.5 MG capsule   Commonly known as:  MICROZIDE   Used for:  Benign essential hypertension        Dose:  12.5 mg   Take 1 capsule (12.5 mg) by mouth daily   Quantity:  30 capsule   Refills:  0        insulin glargine 100 UNIT/ML injection   Commonly known as:  LANTUS   Used for:  Type 2 diabetes mellitus with diabetic nephropathy, with long-term current use of insulin (H)        Dose:  37 Units   Inject 37 Units Subcutaneous At Bedtime   Refills:  0        * NovoLOG FLEXPEN 100 UNIT/ML injection   Generic drug:  insulin aspart        Dose:  12 Units   Inject 12 Units Subcutaneous 2 times daily (with meals) Lunch and dinner. Hold if BG<150.   Refills:  0        * NovoLOG FLEXPEN 100 UNIT/ML injection   Generic drug:   insulin aspart        Dose:  9 Units   Inject 9 Units Subcutaneous daily (with breakfast) Hold if BG<150   Refills:  0        * nystatin cream   Commonly known as:  MYCOSTATIN        Apply topically 2 times daily Apply to groin, abdominal folds, and under both breasts.   Refills:  0        * nystatin cream   Commonly known as:  MYCOSTATIN        Apply topically 2 times daily as needed Apply to groin, abdominal folds, and under both breasts.   Refills:  0        * Notice:  This list has 4 medication(s) that are the same as other medications prescribed for you. Read the directions carefully, and ask your doctor or other care provider to review them with you.            After Care     Activity - Up with nursing assistance           Advance Diet as Tolerated       Follow this diet upon discharge: Orders Placed This Encounter      Room Service      Moderate Consistent CHO Diet       Daily weights       Call Provider for weight gain of more than 2 pounds per day or 5 pounds per week.       Fall precautions           General info for SNF       Length of Stay Estimate: Long Term Care  Condition at Discharge: Improving  Level of care:board and care  Rehabilitation Potential: Fair  Admission H&P remains valid and up-to-date: Yes  Recent Chemotherapy: N/A  Use Nursing Home Standing Orders: Yes       Glucose monitor nursing POCT       Before meals and at bedtime       Intake and output       Every shift       Mantoux instructions       Give two-step Mantoux (PPD) Per Facility Policy Yes               Further instructions from your care team       You are being discharged to: [facility]  Phone:  [phone]        Referrals     Occupational Therapy Adult Consult       Evaluate and treat as clinically indicated.    Reason:  Recent hospitalization       Physical Therapy Adult Consult       Evaluate and treat as clinically indicated.    Reason:  Lower extremity edema, deconditioning , patient  Will benefit from  "lymphedema therapy             Follow-Up Appointment Instructions     Follow Up and recommended labs and tests       Follow up with residential physician.  The following labs/tests are recommended: basic metabolic panel in 3-4 days, monitor creatinine.             Statement of Approval     Ordered          08/20/18 1130  I have reviewed and agree with all the recommendations and orders detailed in this document.  EFFECTIVE NOW     Approved and electronically signed by:  Ying Conner MD                                                 INTERAGENCY TRANSFER FORM - NURSING   8/15/2018                       [place] [address] ONCOLOGY: [phone]            Attending Provider: Geoff Saldana MD     Allergies:  No Known Allergies    Infection:  None   Service:  HOSPITALIST    Ht:  1.6 m (5' 3\")   Wt:  117.5 kg (259 lb)   Admission Wt:  90.7 kg (200 lb)    BMI:  45.88 kg/m 2   BSA:  2.29 m 2            Advance Directives        Scanned docmt in ACP Activity?           No scanned doc        Immunizations     Name Date      TD (ADULT, 7+) 06/15/01     TD (ADULT, 7+) 01/06/86     TD (ADULT, 7+) 01/10/82     TDAP Vaccine (Adacel) 10/12/17       ASSESSMENT     Discharge Profile Flowsheet     EXPECTED DISCHARGE     Transportation Agency  [facility] 08/20/18 1213    Expected Discharge Date  08/20/18 08/20/18 1213   Transportation Contact Info  [phone] 08/20/18 1213    DISCHARGE NEEDS ASSESSMENT     Transportation Status  Active 08/20/18 1213    Concerns To Be Addressed  discharge planning concerns 11/01/17 0827   Skilled Nursing Facility  [facility])  [phone], Fax: [phone] 08/20/18 1213    Patient/family verbalizes understanding of discharge plan recommendations?  Yes 08/20/18 1213   PAS Number  [id number] 06/01/16 1016    Medical Team notified of plan?  yes 08/20/18 1213   SKIN      Equipment Currently Used at Home  walker, rolling;shower chair 08/16/18 1530   Inspection " "of bony prominences  Full 08/20/18 1001    Transportation Available  agency transportation 08/20/18 1213   Full except areas not inspected   -- 08/19/18 0212    # of Referrals Placed by CTS  Post Acute Facilities 08/20/18 1213   Inspection under devices  Full 08/20/18 1001    GASTROINTESTINAL (ADULT,PEDIATRIC,OB)     Skin WDL  ex 08/20/18 1001    GI WDL  ex 08/20/18 1001   Skin Color/Characteristics  redness blanchable;jeremy 08/20/18 1001    Abdominal Appearance  obese 08/20/18 1001   Skin Temperature  warm 08/20/18 1001    Last Bowel Movement  08/20/18 08/20/18 1001   Skin Moisture  moist 08/20/18 1001    GI Signs/Symptoms  fecal incontinence 08/20/18 1001   Skin Elasticity  quick return to original state 08/20/18 1001    Passing flatus  yes 08/20/18 1001   Skin Integrity  bruise(s);rash(es);scab(s);scar(s);skin tear(s);wound(s) 08/20/18 1001    COMMUNICATION ASSESSMENT     Additional Documentation  Rash (LDA);Wound (LDA) 08/16/18 0512    Patient's communication style  spoken language (English or Bilingual) 08/15/18 2111   SAFETY      FINAL RESOURCES     Safety WDL  WDL 08/20/18 1001    Resources List  Skilled Nursing Facility 08/20/18 1213   All Alarms  alarm(s) activated and audible 08/20/18 1001    Other Resources  Transportation Services 08/20/18 1213   Safety Factors  patient up in chair 08/20/18 1001                 Assessment WDL (Within Defined Limits) Definitions           Safety WDL     Effective: 09/28/15    Row Information: <b>WDL Definition:</b> Bed in low position, wheels locked; call light in reach; upper side rails up x 2; ID band on<br> <font color=\"gray\"><i>Item=AS safety wdl>>List=AS safety wdl>>Version=F14</i></font>      Skin WDL     Effective: 09/28/15    Row Information: <b>WDL Definition:</b> Warm; dry; intact; elastic; without discoloration; pressure points without redness<br> <font color=\"gray\"><i>Item=AS skin wdl>>List=AS skin wdl>>Version=F14</i></font>      Vitals     Vital Signs " "Flowsheet     VITAL SIGNS     Side Effects Monitoring: Sedation Level  1 08/20/18 1134    Temp  96.5  F (35.8  C) 08/20/18 0838   HEIGHT AND WEIGHT      Temp src  Oral 08/20/18 0838   Height  1.6 m (5' 3\") 08/15/18 2113    Resp  16 08/20/18 1134   Height Method  Stated 08/15/18 2113    Heart Rate  63 08/20/18 0838   Weight  117.5 kg (259 lb) 08/20/18 0709    Pulse/Heart Rate Source  Monitor 08/20/18 0838   Weight Method  Standing scale 08/20/18 0709    BP  175/68 08/20/18 0838   Bed Scale  Standard (fitted sheet, draw sheet/ pad, cover/flat sheet, blanket, two pillows);Pillow (add comment for number) 08/16/18 0331    BP Location  Right arm 08/20/18 0838   BSA (Calculated - sq m)  2.01 08/15/18 2113    OXYGEN THERAPY     BMI (Calculated)  35.5 08/15/18 2113    SpO2  97 % 08/20/18 0838   VANESA COMA SCALE      O2 Device  None (Room air) 08/20/18 0838   Best Eye Response  4-->(E4) spontaneous 08/16/18 0500    Oxygen Delivery  1 LPM 08/18/18 0736   Best Motor Response  6-->(M6) obeys commands 08/16/18 0500    PAIN/COMFORT     Best Verbal Response  5-->(V5) oriented 08/16/18 0500    Patient Currently in Pain  denies 08/19/18 2348   Vanesa Coma Scale Score  15 08/16/18 0500    Preferred Pain Scale  number (Numeric Rating Pain Scale) 08/19/18 1226   POSITIONING      0-10 Pain Scale  5 08/20/18 1134   Body Position  independently positioning 08/20/18 0107    Pain Location  Leg 08/20/18 1134   Head of Bed (HOB)  HOB at 20-30 degrees 08/20/18 0107    Pain Orientation  Right 08/20/18 1134   Positioning/Transfer Devices  pillows;in use 08/20/18 0107    Pain Descriptors  Constant 08/20/18 1134   Chair  Upright in chair 08/20/18 1001    Pain Intervention(s)  Medication (See eMAR) 08/20/18 1134   DAILY CARE      Response to Interventions  No change in pain (pt stating it feels better despite rating pain the same) 08/19/18 0787   Activity Management  ambulated to bathroom 08/20/18 1119    ANALGESIA SIDE EFFECTS MONITORING   "   Activity Assistance Provided  assistance, stand-by 08/20/18 1115    Side Effects Monitoring: Respiratory Quality  R 08/20/18 1134   Assistive Device Utilized  walker 08/20/18 1115    Side Effects Monitoring: Respiratory Depth  N 08/20/18 1134                 Patient Lines/Drains/Airways Status    Active LINES/DRAINS/AIRWAYS     Name: Placement date: Placement time: Site: Days: Last dressing change:    Wound 08/16/18 Right Leg Ulceration 08/16/18   0400   Leg   4     Rash 08/16/18 0400 Right lower leg 08/16/18   0400    4             Patient Lines/Drains/Airways Status    Active PICC/CVC     Name: Placement date: Placement time: Site: Days: Additional Info Last dressing change:    Midline Catheter Single Lumen 05/31/16   1644    810 External Cath Length (cm): 0 cm            Size (Fr): 4 Fr            Orientation: Right            Extremity Circumference (cm): 34 cm            Vein : Basilic            Dressing Intervention: Chlorhexidine patch;Securing device;New dressing;Transparent            Site Prep: Chlorhexidine            Local Anesthetic: Injectable            Initial Exposed Catheter (cm):             Inserted by: Ewa Manuel RN            Insertion attempts with ultrasound: 1            Patient Tolerance: Tolerated well            Chlorhexadine Sponge Applied: Yes            Difficulty with threading line: No            Description: Valved            Placement Verification: Blood Return            Tip location: Axilla            Full barrier precautions done: Yes            Consent Signed: Yes            Lot #: [id number]               Intake/Output Detail Report     Date Intake     Output Net    Shift P.O. I.V. IV Piggyback Total Urine Total       Day 08/19/18 0700 - 08/19/18 1459 240 -- -- 240 -- -- 240    Amanda 08/19/18 1500 - 08/19/18 2259 240 -- -- 240 200 200 40    Noc 08/19/18 2300 - 08/20/18 0659 -- -- -- -- 300 300 -300    Day 08/20/18 0700 - 08/20/18 1459 -- -- -- -- 600 600 -600    Amanda 08/20/18 1500  - 08/20/18 2259 -- -- -- -- -- -- 0      Last Void/BM       Most Recent Value    Urine Occurrence 1 at 08/20/2018 1115    Stool Occurrence 1 at 08/20/2018 1115      Case Management/Discharge Planning     Case Management/Discharge Planning Flowsheet     REFERRAL INFORMATION     FINAL RESOURCES      Arrived From  home or self-care 10/13/17 1024   Equipment Currently Used at Home  walker, rolling;shower chair 08/16/18 1530    # of Referrals Placed by CTS  Post Acute Facilities 08/20/18 1213   Resources List  Skilled Nursing Facility 08/20/18 1213    CTS Assigned to Case  Andrew BishopJEANNA 08/20/18 1213   Other Resources  Transportation Services 08/20/18 1213    LIVING ENVIRONMENT     Transportation Agency  [facility] 08/20/18 1213    Lives With  facility resident 08/16/18 1530   Transportation Contact Info  [phone] 08/20/18 1213    Living Arrangements  extended care facility 08/16/18 1530   Transportation Status  Active 08/20/18 1213    COPING/STRESS     Skilled Nursing Facility  [facility])  [phone], Fax: [phone] 08/20/18 1213    Major Change/Loss/Stressor  hospitalization 08/16/18 0328   PAS Number  [id number] 06/01/16 1016    Patient Personal Strengths  self-reliant 10/13/17 1026   ABUSE RISK SCREEN      EXPECTED DISCHARGE     QUESTION TO PATIENT:  Has a member of your family or a partner(now or in the past) intimidated, hurt, manipulated, or controlled you in any way?  no 08/15/18 2114    Expected Discharge Date  08/20/18 08/20/18 1213   QUESTION TO PATIENT: Do you feel safe going back to the place where you are living?  yes 08/15/18 2114    ASSESSMENT/CONCERNS TO BE ADDRESSED     OBSERVATION: Is there reason to believe there has been maltreatment of a vulnerable adult (ie. Physical/Sexual/Emotional abuse, self neglect, lack of adequate food, shelter, medical care, or financial exploitation)?  no 08/15/18 2114    Concerns To Be Addressed  discharge planning concerns  11/01/17 0827   OTHER      DISCHARGE PLANNING     Are you depressed or being treated for depression?  No 08/16/18 0328    Patient/family verbalizes understanding of discharge plan recommendations?  Yes 08/20/18 1213   HOMICIDE RISK      Medical Team notified of plan?  yes 08/20/18 1213   Feels Like Hurting Others  no 08/15/18 2114    Transportation Available  agency transportation 08/20/18 1213                       [place] [address] ONCOLOGY: [phone]            Medication Administration Report for Abmer Hull as of 08/20/18 1534   Legend:    Given Hold Not Given Due Canceled Entry Other Actions    Time Time (Time) Time  Time-Action       Inactive    Active    Linked        Medications 08/14/18 08/15/18 08/16/18 08/17/18 08/18/18 08/19/18 08/20/18    acetaminophen (TYLENOL) tablet 650 mg  Dose: 650 mg  Freq: EVERY 4 HOURS PRN Route: PO  PRN Reason: mild pain  Start: 08/16/18 0325   Admin Instructions: Alternate ibuprofen (if ordered) with acetaminophen.  Maximum acetaminophen dose from all sources = 75 mg/kg/day not to exceed 4 grams/day.    Admin. Amount: 2 tablet (2 × 325 mg tablet)  Last Admin: 08/20/18 1133  Dispense Loc:  ADS 88B       0844 (650 mg)-Given        1746 (650 mg)-Given       2152 (650 mg)-Given         0651 (650 mg)-Given [C]       1225 (650 mg)-Given        1133 (650 mg)-Given           albuterol neb solution 2.5 mg  Dose: 3 mL  Freq: EVERY 2 HOURS PRN Route: NEBULIZATION  PRN Reasons: wheezing,shortness of breath / dyspnea  Start: 08/16/18 1031   Admin. Amount: 2.5 mg = 3 mL Conc: 2.5 mg/3 mL  Dispense Loc:  ADS 88B  Volume: 3 mL               amLODIPine (NORVASC) tablet 10 mg  Dose: 10 mg  Freq: DAILY Route: PO  Start: 08/16/18 0900   Admin. Amount: 1 tablet (1 × 10 mg tablet)  Last Admin: 08/20/18 0826  Dispense Loc:  ADS 88B       0845 (10 mg)-Given        0859 (10 mg)-Given        0810 (10 mg)-Given        0748 (10 mg)-Given               0826 (10 mg)-Given            aspirin EC tablet 325 mg  Dose: 325 mg  Freq: DAILY Route: PO  Start: 08/16/18 0900   Admin Instructions: DO NOT CRUSH.    Admin. Amount: 1 tablet (1 × 325 mg tablet)  Last Admin: 08/20/18 0830  Dispense Loc: [facility] 88B       0844 (325 mg)-Given        0859 (325 mg)-Given        0811 (325 mg)-Given        0748 (325 mg)-Given               0830 (325 mg)-Given           atorvastatin (LIPITOR) tablet 10 mg  Dose: 10 mg  Freq: DAILY Route: PO  Start: 08/16/18 0900   Admin. Amount: 1 tablet (1 × 10 mg tablet)  Last Admin: 08/20/18 0825  Dispense Loc: [facility] 88B       0844 (10 mg)-Given        0859 (10 mg)-Given        0811 (10 mg)-Given        0748 (10 mg)-Given               0825 (10 mg)-Given           bisacodyl (DULCOLAX) Suppository 10 mg  Dose: 10 mg  Freq: DAILY PRN Route: RE  PRN Reason: constipation  Start: 08/16/18 0325   Admin Instructions: Hold for loose stools.  This is the third step of a three step constipation treatment.    Admin. Amount: 1 suppository (1 × 10 mg suppository)  Dispense Loc: [facility] 88B               citalopram (celeXA) tablet 10 mg  Dose: 10 mg  Freq: DAILY Route: PO  Start: 08/16/18 1100   Admin. Amount: 1 tablet (1 × 10 mg tablet)  Last Admin: 08/20/18 0826  Dispense Loc: [place] [id number]       1217 (10 mg)-Given        0900 (10 mg)-Given        0810 (10 mg)-Given        0748 (10 mg)-Given               0826 (10 mg)-Given           glucose gel 15-30 g  Dose: 15-30 g  Freq: EVERY 15 MIN PRN Route: PO  PRN Reason: low blood sugar  Start: 08/16/18 0325   Admin Instructions: Give 15 g for Blood Glucose 51 to 69 mg/dL IF patient is conscious and able to swallow.  Give 30 g for Blood Glucose less than or equal to 50 mg/dL. IF patient is conscious and able to swallow.  Do NOT give glucose gel via enteral tube.  IF patient has enteral tube: give apple juice 120 mL (4 oz or 15 g of CHO) via enteral tube for Blood Glucose 51 to 69 mg/dL.  Give apple juice 240 mL (8 oz or 30 g of CHO) via enteral tube for  Blood Glucose less than or equal to 50 mg/dL.    ~Oral gel is preferable for conscious and able to swallow patient.   ~IF gel unavailable or patient refuses may provide apple juice 120 mL (4 oz or 15 g of CHO). Document juice on I and O flowsheet.    Admin. Amount: 15-30 g  Dispense Loc:  ADS 88B  Volume: 93.75 mL              Or  dextrose 10% BOLUS  Dose: 2 mL/kg  Weight Dosing Info: 90.7 kg  Freq: EVERY 15 MIN PRN Route: IV  PRN Comment: Repeat until glucose value greater than 70 mg/dL  Start: 08/16/18 0325   Admin Instructions: Use for hypoglycemia with glucose less than or equal to 50 mg/dL in the NPO or unconscious/not alert patient with IV access.    Admin. Amount: 181 mL  Dispense Loc: [facility] [facility] Stock  Volume: 500 mL              Or  glucagon injection 0.5-1 mg  Dose: 0.5-1 mg  Freq: EVERY 15 MIN PRN Route: SC  PRN Reason: low blood sugar  PRN Comment: May repeat x 1 only.    Start: 08/16/18 0325   Admin Instructions: May give SQ or IM. Dose 0.5 mg if LESS than 5 years old, 1 mg if 5 years or older.    ONLY use glucagon IF patient has NO IV access AND is unable/unwilling to swallow AND blood glucose LESS than or EQUAL to 50 mg/dL.  If ordered IV, give IV Push over 1 minute. Reconstitute with 1mL sterile water.    Admin. Amount: 0.5-1 mg  Dispense Loc:  ADS 88B               guanFACINE (TENEX) tablet 2 mg  Dose: 2 mg  Freq: DAILY Route: PO  Start: 08/16/18 0900   Admin. Amount: 2 tablet (2 × 1 mg tablet)  Last Admin: 08/20/18 0826  Dispense Loc: [facility] 88B       1050 (2 mg)-Given        0900 (2 mg)-Given        0811 (2 mg)-Given        0748 (2 mg)-Given               0826 (2 mg)-Given           heparin sodium PF injection 5,000 Units  Dose: 5,000 Units  Freq: EVERY 12 HOURS Route: SC  Start: 08/16/18 0330   Admin Instructions: HOLD heparin IF platelet count falls below 50% baseline or less than 100,000 /  L and notify provider. Use this product If CrCl less than 30 mL/min.  High concentration heparin. Not  for line flush or cath care.    Admin. Amount: 5,000 Units = 0.5 mL Conc: 5,000 Units/0.5 mL  Last Admin: 08/20/18 0458  Dispense Loc: [facility] 88B  Volume: 0.5 mL       0413 (5,000 Units)-Given       1604 (5,000 Units)-Given        0420 (5,000 Units)-Given       1736 (5,000 Units)-Given        0335 (5,000 Units)-Given       1642 (5,000 Units)-Given        0443 (5,000 Units)-Given       1619 (5,000 Units)-Given        0458 (5,000 Units)-Given       [ ] 1600           hydrALAZINE (APRESOLINE) injection 10 mg  Dose: 10 mg  Freq: EVERY 4 HOURS PRN Route: IV  PRN Reason: high blood pressure  PRN Comment: give for SBP > 180 or dbp > 100  Start: 08/16/18 1457   Admin Instructions: For ordered doses up to 40 mg, give IV Push undiluted over 1 minute.    Admin. Amount: 10 mg = 0.5 mL Conc: 20 mg/mL  Dispense Loc:  ADS 88B  Volume: 0.5 mL               hydrALAZINE (APRESOLINE) tablet 10 mg  Dose: 10 mg  Freq: 3 TIMES DAILY Route: PO  Start: 08/16/18 0900   Admin. Amount: 1 tablet (1 × 10 mg tablet)  Last Admin: 08/20/18 0826  Dispense Loc:  ADS 88B       0844 (10 mg)-Given       1604 (10 mg)-Given       2159 (10 mg)-Given        0900 (10 mg)-Given       1736 (10 mg)-Given       2143 (10 mg)-Given        0811 (10 mg)-Given       1642 (10 mg)-Given       2106 (10 mg)-Given        0747 (10 mg)-Given                     1619 (10 mg)-Given       2104 (10 mg)-Given        0826 (10 mg)-Given       [ ] 1600       [ ] 2200           insulin aspart (NovoLOG) inj (RAPID ACTING)  Dose: 1-5 Units  Freq: AT BEDTIME Route: SC  Start: 08/16/18 0330   Admin Instructions: MEDIUM INSULIN RESISTANCE DOSING    Do Not give Bedtime Correction Insulin if BG less than  200.   For  - 249 give 1 units.   For  - 299 give 2 units.   For  - 349 give 3 units.   For  -399 give 4 units.   For BG greater than or equal to 400 give 5 units.  Notify provider if glucose greater than or equal to 350 mg/dL after administration of  correction dose.  If given at mealtime, must be administered 5 min before meal or immediately after.    Admin. Amount: 1-5 Units  Dispense Loc: Contact Rx for dose  Volume: 3 mL       (0412)-Not Given       (2148)-Not Given        (2141)-Not Given [C]        (2105)-Not Given        (2123)-Not Given        [ ] 2200           insulin aspart (NovoLOG) inj (RAPID ACTING)  Dose: 1-7 Units  Freq: 3 TIMES DAILY BEFORE MEALS Route: SC  Start: 08/16/18 0730   Admin Instructions: Correction Scale - MEDIUM INSULIN RESISTANCE DOSING     Do Not give Correction Insulin if Pre-Meal BG less than 140.   For Pre-Meal  - 189 give 1 unit.   For Pre-Meal  - 239 give 2 units.   For Pre-Meal  - 289 give 3 units.   For Pre-Meal  - 339 give 4 units.   For Pre-Meal - 399 give 5 units.   For Pre-Meal -449 give 6 units  For Pre-Meal BG greater than or equal to 450 give 7 units.   To be given with prandial insulin, and based on pre-meal blood glucose.    Notify provider if glucose greater than or equal to 350 mg/dL after administration of correction dose.  If given at mealtime, must be administered 5 min before meal or immediately after.    Admin. Amount: 1-7 Units  Last Admin: 08/18/18 1742  Dispense Loc: Contact Rx for dose  Volume: 3 mL       (0904)-Not Given       (1113)-Not Given       1714 (1 Units)-Given        (0858)-Not Given       (1126)-Not Given       (1737)-Not Given        (0753)-Not Given       (1139)-Not Given [C]       1742 (1 Units)-Given        (0807)-Not Given [C]       (1142)-Not Given [C]       (1716)-Not Given        (0835)-Not Given       (1204)-Not Given       [ ] 1700           insulin aspart (NovoLOG) inj (RAPID ACTING)  Dose: 5 Units  Freq: 3 TIMES DAILY WITH MEALS Route: SC  Start: 08/16/18 0900   Admin Instructions: If given at mealtime, must be administered 5 min before meal or immediately after.    Admin. Amount: 5 Units  Last Admin: 08/20/18 1203  Dispense Loc: Contact Rx for  dose  Volume: 3 mL       (1043)-Not Given [C]       1217 (5 Units)-Given       1714 (5 Units)-Given        0858 (5 Units)-Given       1126 (5 Units)-Given       1749 (5 Units)-Given        0752 (5 Units)-Given       1149 (5 Units)-Given       1742 (5 Units)-Given        0807 (5 Units)-Given       1142 (5 Units)-Given       1732 (5 Units)-Given        0836 (5 Units)-Given       1203 (5 Units)-Given       [ ] 1700           insulin glargine (LANTUS) injection 30 Units  Dose: 30 Units  Freq: AT BEDTIME Route: SC  Start: 08/16/18 0330   Admin. Amount: 30 Units  Last Admin: 08/19/18 2123  Dispense Loc: [facility]  Volume: 0.3 mL       0428 (30 Units)-Given       2159 (30 Units)-Given        2143 (30 Units)-Given        2109 (30 Units)-Given        2123 (30 Units)-Given        [ ] 2200           magnesium hydroxide (MILK OF MAGNESIA) suspension 30 mL  Dose: 30 mL  Freq: DAILY PRN Route: PO  PRN Reason: constipation  Start: 08/16/18 0325   Admin Instructions: Hold for loose stools.  This is the second step of a three step constipation treatment.    Admin. Amount: 30 mL  Dispense Loc:  ADS 88B  Volume: 30 mL               melatonin tablet 1 mg  Dose: 1 mg  Freq: AT BEDTIME PRN Route: PO  PRN Reason: sleep  Start: 08/16/18 0325   Admin Instructions: Do not give unless at least 6 hours of uninterrupted sleep is expected.    Admin. Amount: 1 tablet (1 × 1 mg tablet)  Dispense Loc:  ADS 88B               naloxone (NARCAN) injection 0.1-0.4 mg  Dose: 0.1-0.4 mg  Freq: EVERY 2 MIN PRN Route: IV  PRN Reason: opioid reversal  Start: 08/16/18 0325   Admin Instructions: For respiratory rate LESS than or EQUAL to 8.  Partial reversal dose:  0.1 mg titrated q 2 minutes for Analgesia Side Effects Monitoring Sedation Level of 3 (frequently drowsy, arousable, drifts to sleep during conversation).Full reversal dose:  0.4 mg bolus for Analgesia Side Effects Monitoring Sedation Level of 4 (somnolent, minimal or no response to  stimulation).  For ordered doses up to 2mg give IVP. Give each 0.4mg over 15 seconds in emergency situations. For non-emergent situations further dilute in 9mL of NS to facilitate titration of response.    Admin. Amount: 0.1-0.4 mg = 0.25-1 mL Conc: 0.4 mg/mL  Dispense Loc: [facility] 88B  Volume: 1 mL               nystatin (MYCOSTATIN) cream  Freq: 2 TIMES DAILY Route: Top  Start: 08/16/18 1100   Admin Instructions: Apply to groin, abdominal folds, and under both breasts    Last Admin: 08/20/18 0837  Dispense Loc:  Main Pharmacy       (0501)-Not Given       2005 ( )-Given        0901 ( )-Given       2154 ( )-Given        (0813)-Not Given       2105 ( )-Given        (0808)-Not Given       (2123)-Not Given        0837 ( )-Given       [ ] 2100           ondansetron (ZOFRAN-ODT) ODT tab 4 mg  Dose: 4 mg  Freq: EVERY 6 HOURS PRN Route: PO  PRN Reasons: nausea,vomiting  Start: 08/16/18 0325   Admin Instructions: This is Step 1 of nausea and vomiting management.  If nausea not resolved in 15 minutes, go to Step 2 prochlorperazine (COMPAZINE). Do not push through foil backing. Peel back foil and gently remove. Place on tongue immediately. Administration with liquid unnecessary  With dry hands, peel back foil backing and gently remove tablet; do not push oral disintegrating tablet through foil backing; administer immediately on tongue and oral disintegrating tablet dissolves in seconds; then swallow with saliva; liquid not required.    Admin. Amount: 1 tablet (1 × 4 mg tablet)  Dispense Loc:  ADS 88B              Or  ondansetron (ZOFRAN) injection 4 mg  Dose: 4 mg  Freq: EVERY 6 HOURS PRN Route: IV  PRN Reasons: nausea,vomiting  Start: 08/16/18 0325   Admin Instructions: This is Step 1 of nausea and vomiting management.  If nausea not resolved in 15 minutes, go to Step 2 prochlorperazine (COMPAZINE).  Irritant. For ordered doses up to 4 mg, give IV Push undiluted over 2-5 minutes.    Admin. Amount: 4 mg = 2 mL Conc: 4 mg/2  mL  Dispense Loc:  ADS 88B  Infused Over: 2-5 Minutes  Volume: 2 mL               piperacillin-tazobactam (ZOSYN) 2.25 g vial to attach to  ml bag  Dose: 2.25 g  Freq: EVERY 6 HOURS Route: IV  Indications of Use: SKIN AND SOFT TISSUE INFECTION  Last Dose: 2.25 g (08/20/18 1204)  Start: 08/19/18 1130   Admin Instructions: Renally dosed for crcl 20-40 ml/min    Admin. Amount: 2.25 g  Last Admin: 08/20/18 1204  Dispense Loc:  ADS 88B  Infused Over: 30 Minutes  Volume: 10 mL          1142 (2.25 g)-New Bag       1653 (2.25 g)-New Bag       2325 (2.25 g)-New Bag [C]        0458 (2.25 g)-New Bag       1204 (2.25 g)-New Bag       [ ] 1730       [ ] 2330           polyethylene glycol (MIRALAX/GLYCOLAX) Packet 17 g  Dose: 17 g  Freq: DAILY PRN Route: PO  PRN Reason: constipation  Start: 08/16/18 0325   Admin Instructions: 1 Packet = 17 grams. Mixed prescribed dose in 8 ounces of water.<br>If the patient has multiple PO bowel stimulant agents ordered PRN, offer in the following order per policy.  Move to the next available step if the earlier step is ineffective.<br>Step 1 - senna; Step 2 - bisacodyl; Step 3 - milk of magnesia; Step 4 - polyethylene glycol; Step 5 - magnesium citrate.  1 Packet = 17 grams. Mixed prescribed dose in 8 ounces of water. Follow with 8 oz. of water.    Admin. Amount: 17 g  Dispense Loc:  ADS 88B               senna-docusate (SENOKOT-S;PERICOLACE) 8.6-50 MG per tablet 1 tablet  Dose: 1 tablet  Freq: 2 TIMES DAILY Route: PO  Start: 08/16/18 0900   Admin Instructions: If no bowel movement in 24 hours, increase to 2 tablets PO.  Hold for loose stools.    Admin. Amount: 1 tablet  Last Admin: 08/19/18 0747  Dispense Loc:  ADS 88B       0844 (1 tablet)-Given                      (2142)-Not Given [C]               (2105)-Not Given        0747 (1 tablet)-Given              (2302)-Not Given        (1022)-Not Given       [ ] 2100          Or  senna-docusate (SENOKOT-S;PERICOLACE) 8.6-50 MG per  tablet 2 tablet  Dose: 2 tablet  Freq: 2 TIMES DAILY Route: PO  Start: 18 0900   Admin Instructions: Hold for loose stools.    Admin. Amount: 2 tablet  Last Admin: 18  Dispense Loc:  ADS 88B               (2 tablet)-Given        08 (2 tablet)-Given               0810 (2 tablet)-Given                                            [ ] 2100           senna-docusate (SENOKOT-S;PERICOLACE) 8.6-50 MG per tablet 1 tablet  Dose: 1 tablet  Freq: 2 TIMES DAILY PRN Route: PO  PRN Reason: constipation  Start: 18   Admin Instructions: If no bowel movement in 24 hours, increase to 2 tablets PO.  Hold for loose stools.  This is the first step of a three step constipation treatment.    Admin. Amount: 1 tablet  Dispense Loc:  ADS 88B              Or  senna-docusate (SENOKOT-S;PERICOLACE) 8.6-50 MG per tablet 2 tablet  Dose: 2 tablet  Freq: 2 TIMES DAILY PRN Route: PO  PRN Reason: constipation  Start: 18   Admin Instructions: Hold for loose stools.  This is the first step of a three step constipation treatment.    Admin. Amount: 2 tablet  Dispense Loc:  ADS 88B              Completed Medications  Medications 08/14/18 08/15/18 08/16/18 08/17/18 08/18/18 08/19/18 08/20/18         Dose: 1,000 mL  Freq: ONCE Route: IV  Last Dose: 1,000 mL (18)  Start: 18 09   End: 18 1121   Admin. Amount: 1,000 mL  Last Admin: 18  Dispense Loc: [facility]  Infused Over: 2 Hours  Administrations Remainin  Volume: 1,000 mL          0921 (1,000 mL)-New Bag           Discontinued Medications  Medications 08/14/18 08/15/18 08/16/18 08/17/18 08/18/18 08/19/18 08/20/18         Dose: 650 mg  Freq: 3 TIMES DAILY PRN Route: PO  PRN Reason: mild pain  Start: 18   End: 18 1105   Admin Instructions: Maximum acetaminophen dose from all sources = 75 mg/kg/day not to exceed 4 grams/day.    Admin. Amount: 2 tablet (2 × 325 mg tablet)  Last Admin: 18  1642  Dispense Loc:  ADS 88B         0815 (650 mg)-Given       1642 (650 mg)-Given         1105-Med Discontinued         Dose: 3.375 g  Freq: EVERY 6 HOURS Route: IV  Indications of Use: SKIN AND SOFT TISSUE INFECTION  Start: 08/16/18 0330   End: 08/19/18 1038   Admin. Amount: 3.375 g  Last Admin: 08/19/18 0443  Dispense Loc:  ADS 88B  Infused Over: 30 Minutes  Volume: 15 mL       0413 (3.375 g)-New Bag       1051 (3.375 g)-New Bag       1603 (3.375 g)-New Bag       2159 (3.375 g)-New Bag        0420 (3.375 g)-New Bag       1114 (3.375 g)-New Bag       1735 (3.375 g)-New Bag       2136 (3.375 g)-New Bag        0335 (3.375 g)-New Bag       1146 (3.375 g)-New Bag       1714 (3.375 g)-New Bag       2355 (3.375 g)-New Bag        0443 (3.375 g)-New Bag       1038-Med Discontinued                 INTERAGENCY TRANSFER FORM - NOTES (H&P, Discharge Summary, Consults, Procedures, Therapies)   8/15/2018                       [place] [address] ONCOLOGY: [phone]               History & Physicals      H&P signed by Geoff Saldana MD at 8/16/2018  8:16 AM      Author:  Geoff Saldana MD Service:  Hospitalist Author Type:  Physician    Filed:  8/16/2018  8:16 AM Date of Service:  8/16/2018  2:45 AM Creation Time:  8/16/2018  3:12 AM    Status:  Signed :  Geoff Saldana MD (Physician)         Admitted:     08/15/2018      DATE OF ADMISSION: 08/16/2018      PRIMARY CARE PHYSICIAN: Gregg Teran DO      CHIEF COMPLAINT:  Fever, right leg pain.      HISTORY OF PRESENT ILLNESS:  Amber Hull is a 73-year-old female with type 2 diabetes on insulin with a history of hypertension, depression, and chronic kidney disease who presents to [facility] with fever.  The patient currently lives in a nursing home.  Today the patient was noted to have a fever and she has chronic right lower extremity wounds.  She has noticed increased redness and tenderness.  She came to [facility] for further assessment.      In the emergency department, the patient was seen by Dr. Maik Carrasco.  The patient had normal electrolytes, BUN of 29, creatinine 1.40 with calculated GFR 37.  Albumin is 2.3, total protein 7.1, alkaline phosphatase of 215, ALT and AST are normal.  Normal lactic acid level.  White count elevated at 16,200, hemoglobin 10, platelets 288 with a left shift.  Urinalysis has 70 of glucose, specific gravity 1.006, 300 of protein, 1 white cell, 1 red cell.  Blood cultures obtained.  The patient received IV ceftriaxone and is being admitted for cellulitis.      PAST MEDICAL HISTORY:   1.  Type 2 diabetes.    2.  Chronic kidney disease.    3.  History of group B strep sepsis.   4.  Hyperlipidemia.    5.  Hypertension.   6.  Hypothyroidism.      PAST SURGICAL HISTORY:   1.  Total abdominal hysterectomy.    2.  Colonoscopy.    3.  Bunion correction surgery.   4.  D and C.    5.  Ganglion excision from the left wrist x 2.  6.  Flexible sigmoidoscopy.    7.  Cholecystectomy.     8.  Bilateral carpal tunnel release.     9.  Teeth extraction.    10.  Laser holmium lithotripsy with ureteral stent placement.      FAMILY HISTORY:  Maternal grandfather with myocardial infarction.      SOCIAL HISTORY:  She is .  No tobacco, no alcohol.  She lives in a nursing home.      ALLERGIES:  NO KNOWN DRUG ALLERGIES.      CURRENT MEDICATIONS:   1.  Lipitor 5 mg once a day.   2.  Amlodipine 10 mg once daily.   3.  Aspirin 325 mg once a day.    4.  Celexa 10 mg once a day.   5.  Hydralazine 10 mg 3 times a day.   6.  Hydrochlorothiazide 12.5 mg once a day.   7.  Lantus 37 units at bedtime.   8.  Lasix 40 mg b.i.d.   9.  NovoLog 9 units with breakfast, 12 units at lunch and 12 units with supper.    10.  Guanfacine 2 mg daily.      REVIEW OF SYSTEMS:  Ten-point systems reviewed and as detailed in history of present illness.      PHYSICAL EXAMINATION:   VITAL SIGNS:  Temperature 98.7, heart rate 95,  respiration 18, blood pressure 215/182, sats 94%.   GENERAL:  The patient is a 73-year-old  female.  She is pleasant, cooperative, not confused, oriented x 3.   HEENT:  Exam is unremarkable.   NECK:  Veins are not distended.   LUNGS:  Clear to auscultation.   CARDIOVASCULAR:  S1, S2, regular rate and rhythm.   ABDOMEN:  Soft, obese, no focal tenderness.   EXTREMITIES:  She has bilateral lower extremity edema.  She has ulcers over the medial and lateral aspect of the right lower leg.  There are areas of tenderness and erythema of her right lower extremity.   NEUROLOGIC:  She has decreased sensation of her feet.  Cranial nerves are grossly intact.  Upper extremity exam is unremarkable.      LABORATORY AND STUDIES: As dictated in history of present illness. Specifically, white count is elevated at 16,200, hemoglobin 10, platelets 288, normal lactic acid level.      ASSESSMENT:  Amber Hull is a 73-year-old diabetic with hypertension and chronic leg wounds, admitted with fever, right lower extremity pain with erythema and ulcers, suspicious for cellulitis.  Being admitted for further treatment.      PLAN:   1.  Right lower extremity cellulitis.  The patient will be seen by wound care nurse.  We will continue the patient on her diuretics.  The patient will be on Zosyn.  No documented history of MRSA.  The patient had blood cultures obtained.   2.  Hypertension.  We will continue the patient on amlodipine, hydralazine, and hydrochlorothiazide.   3.  Hyperlipidemia.  We will continue the patient on her Lipitor.   4.  Depression.  Will continue the patient on Celexa.   5.  Diabetes.  The patient will be continued on Lantus.  We will start with 30 units at bedtime.  She is normally on 37 units.  Will also do sliding scale insulin.  The patient's mealtime NovoLog will be decreased from the usual outpatient doses to start out with.   6.  DVT prophylaxis: The patient will receive subcutaneous heparin.   7.  CODE  STATUS:  FULL.         GEOFF NESS MD             D: 2018   T: 2018   MT:       Name:     AMBER HULL   MRN:      [id number]        Account:      [id number]   :      1945        Admitted:     08/15/2018                   Document: [id number]       cc: Gregg Teran DO   [PD1.1]   Revision History        User Key Date/Time User Provider Type Action    > PD1.1 2018  8:16 AM Geoff Ness MD Physician Sign                  Discharge Summaries     No notes of this type exist for this encounter.      Consult Notes     No notes of this type exist for this encounter.         Progress Notes - Physician (Notes for yesterday and today)      ED Notes signed by Carolyne Salazar-Kriss at 2018  8:23 AM      Author:  Scan, Non-Kriss Service:  (none) Author Type:  (none)    Filed:  2018  8:23 AM Date of Service:  2018  8:22 AM Creation Time:  2018  8:23 AM    Status:  Signed :  Scan, Non-Provider     Scan on 2018  8:23 AM by Martin Non-Provider : [id number]          Revision History        User Key Date/Time User Provider Type Action    > [N/A] 2018  8:23 AM Scan Non-Provider (none) Sign            Progress Notes by Ying Conner MD at 2018  8:52 AM     Author:  Ying Conner MD Service:  Hospitalist Author Type:  Physician    Filed:  2018  2:28 PM Date of Service:  2018  8:52 AM Creation Time:  2018  8:52 AM    Status:  Signed :  Ying Conner MD (Physician)         [facility]    Hospitalist Progress Note    Assessment & Plan       Amber Hull is a 73-year-old diabetic with hypertension and chronic leg wounds, admitted with fever, right lower extremity pain with erythema and ulcers, suspicious for cellulitis. Became hypoxic on HD #1 and found to have RLL pneumonia.       Sepsis due to right lower extremity cellulitis and pneumonia.    Noted with increased redness, swelling, pain of her chronic  LE wounds as well as fever at her NH. Received IV ceftriaxone in the ED. LE US negative for DVT.    - Abx Changed to IV Zosyn on admit.  No documented history of MRSA.    - Blood culture negative todate   - Pain control, Leg elevation.   - Fever and leukocytosis improving.   - WOC Rn appreciated.   - lymphedema[CT1.1] team to follow[CT1.2]   - Pneumonia treatment as below      RLL pneumonia with acute hypoxic respiratory failure:   Patient requiring 2-3 L of oxygen to maintain O2 saturation above 90% on HD #1.  She was not reported to be hypoxic in the emergency department.  She denies any respiratory symptoms such as cough or shortness of breath.  Temperature max of 101.2 with mild tachycardia. CXR with a RLL PNA.    - Continue treatment with IV Zosyn which will cover for cellulitis as well as potentially aspiration pneumonia.   - wean oxygen as possible   - Patient does reside in a nursing home.  - Nebulizer as needed.     CKD Stage III  Baseline Cr has ranged from 1-1.7 over the last 2 years. Some component of ace-I induced kidney injury was suspected on top of her CKD on her recent stay and this was discontinued. Cr on admission was 1.4 and has trended up to 1.86 today. UA without signs of infection.   - Will hold her PTA Lasix 40 mg bid and hydrochlorothiazide 12.5 mg daily[CT1.1] for past 2 days[CT1.2]   -[CT1.1]creatinine going up, will try some fluids today , basic metabolic panel in am[CT1.2]      Paroxysmal A-Fib  Hypertension.   Blood pressures have been quite elevated on admission.  PTA is on amlodipine, hydralazine, lasix and hydrochlorothiazide. Previously on Metoprolol and Lisinopril but discontinued for worsening renal fxn and bradycardia.  Not on AC PTA for her A-Fib with a [id number] of 4 due to fall risk.  Had previously been on Apixaban but was not taking. Continued on full dose aspirin instead.   - Holding lasix and hydrochlorothiazide as above.   - Hydralazine IV as needed for SBP >180  -  Adjust oral medications as necessary  -basic metabolic panel in am       Insulin dependent diabetes.    - On Lantus 37 units at home.  We will start with 30 units at bedtime.    - Sliding scale insulin.    - The patient's mealtime NovoLog will be decreased from the usual outpatient doses to 5 U tid.   -blood sugars controlled in the current regime.     Depression  Possible dementia     - Continue Celexa.       DVT Prophylaxis: Heparin SQ  Code Status: Full Code     Disposition: Expected discharge once cr stable[CT1.1] and cellulitis improves, possibly 8/20[CT1.2]    Ying Conner MD  Text Page   (7am to 6pm)    Interval History[CT1.1]   Resting, shortness of breath  Only with activity, lower extremity erythema improved, her creatinine has gotten worse, currently lasix on hold, discussed about some iv fluids.urine culture negative.[CT1.2]    -Data reviewed today: I reviewed all new labs and imaging results over the last 24 hours.     Physical Exam   Temp: 97.8  F (36.6  C) Temp src: Oral BP: 155/82   Heart Rate: 82 Resp: 18 SpO2: 96 % O2 Device: None (Room air)    Vitals:    08/17/18 0300 08/18/18 0615 08/19/18 0208   Weight: 117.6 kg (259 lb 3.2 oz) 115.7 kg (255 lb) 117 kg (258 lb)     Vital Signs with Ranges  Temp:  [96.3  F (35.7  C)-97.8  F (36.6  C)] 97.8  F (36.6  C)  Heart Rate:  [65-82] 82  Resp:  [18] 18  BP: (136-161)/(59-82) 155/82  SpO2:  [92 %-96 %] 96 %  I/O last 3 completed shifts:  In: 880 [P.O.:880]  Out: -     Constitutional: Awake, alert, cooperative, no apparent distress, obese   Respiratory: bilateral basilar crackles+  Cardiovascular: Regular rate and rhythm, normal S1 and S2, and no murmur noted  GI: Normal bowel sounds, soft, non-distended, non-tender  Skin/Integumen: bilateral lower extremity edema  Non pitting+, right lower extremity erythematous, ulcer x 2  +  Neuro : moving all 4 extremities, no focal deficit noted     Medications       sodium chloride 0.9%  1,000 mL Intravenous Once      amLODIPine  10 mg Oral Daily     aspirin  325 mg Oral Daily     atorvastatin (LIPITOR) tablet 10 mg  10 mg Oral Daily     citalopram  10 mg Oral Daily     guanFACINE  2 mg Oral Daily     heparin  5,000 Units Subcutaneous Q12H     hydrALAZINE  10 mg Oral TID     insulin aspart  5 Units Subcutaneous TID w/meals     insulin aspart  1-7 Units Subcutaneous TID AC     insulin aspart  1-5 Units Subcutaneous At Bedtime     insulin glargine  30 Units Subcutaneous At Bedtime     nystatin   Topical BID     piperacillin-tazobactam  3.375 g Intravenous Q6H     senna-docusate  1 tablet Oral BID    Or     senna-docusate  2 tablet Oral BID       Data     Recent Labs  Lab 08/19/18  0750 08/18/18  1156 08/17/18  0722 08/16/18  0730 08/15/18  2200   WBC  --   --  11.8*  --  16.2*   HGB  --   --  8.7*  --  10.0*   MCV  --   --  87  --  87     --  227 287 288    141 141  --  138   POTASSIUM 4.7 4.5 4.3  --  5.0   CHLORIDE 109 109 110*  --  108   CO2 22 22 22  --  20   BUN 42* 38* 32*  --  29   CR 2.21* 2.10* 1.86*  --  1.40*   ANIONGAP 8 10 9  --  10   IDANIA 8.5 8.4* 8.4*  --  8.7   * 120* 110*  --  120*   ALBUMIN  --   --   --   --  2.3*   PROTTOTAL  --   --   --   --  7.1   BILITOTAL  --   --   --   --  0.4   ALKPHOS  --   --   --   --  215*   ALT  --   --   --   --  20   AST  --   --   --   --  23       Recent Labs  Lab 08/19/18  0805 08/19/18  0750 08/19/18  0205 08/18/18  2105 08/18/18  1656 08/18/18  1156 08/18/18  1130  08/17/18  0722  08/15/18  2200   GLC  --  134*  --   --   --  120*  --   --  110*  --  120*   *  --  129* 161* 154*  --  116*  < >  --   < >  --    < > = values in this interval not displayed.    Imaging:   No results found for this or any previous visit (from the past 24 hour(s)).[CT1.1]     Revision History        User Key Date/Time User Provider Type Action    > CT1.2 8/19/2018  2:28 PM Ying Conner MD Physician Sign     CT1.1 8/19/2018  8:52 AM Ying Conner MD Physician                    Procedure Notes     No notes of this type exist for this encounter.      Progress Notes - Therapies (Notes from 08/17/18 through 08/20/18)     No notes of this type exist for this encounter.                                          INTERAGENCY TRANSFER FORM - LAB / IMAGING / EKG / EMG RESULTS   8/15/2018                       [place] [address] ONCOLOGY: [phone]            Unresulted Labs (24h ago through future)    Start       Ordered    08/19/18 0600  Platelet count  (Pharmacological Prophylaxis- heparin (If CrCl less than 30 mL/min))  EVERY THREE DAYS,   Routine     Comments:  Repeat every 3 days while on VTE prophylaxis. If no result is listed, this lab has not been done the past 365 days. LATEST LAB RESULT: Platelet Count (10e9/L)       Date                     Value                 08/15/2018               288              ----------      08/16/18 0325    08/17/18 0600  Platelet count  EVERY THREE DAYS,   Routine     Comments:  If no result is listed, this lab has not been done the past 365 days. LATEST LAB RESULT: Platelet Count (10e9/L)       Date                     Value                 08/16/2018               287              ----------    08/16/18 1039         Lab Results - 3 Days      Glucose by meter [[id number]] (Abnormal)  Resulted: 08/20/18 1213, Result status: Final result    Ordering provider: Geoff Saldana MD  08/20/18 1124 Resulting lab: POINT OF CARE TEST, GLUCOSE    Specimen Information    Type Source Collected On     08/20/18 1124          Components       Value Reference Range Flag Lab   Glucose 116 70 - 99 mg/dL H 170            Basic metabolic panel [[id number]] (Abnormal)  Resulted: 08/20/18 0802, Result status: Final result    Ordering provider: Ying Conner MD  08/20/18 0000 Resulting lab: [facility]    Specimen Information    Type Source Collected On   Blood  08/20/18 0723          Components       Value Reference Range Flag Lab   Sodium  138 133 - 144 mmol/L  FrStHsLb   Potassium 4.9 3.4 - 5.3 mmol/L  FrStHsLb   Chloride 110 94 - 109 mmol/L H FrStHsLb   Carbon Dioxide 19 20 - 32 mmol/L L FrStHsLb   Anion Gap 9 3 - 14 mmol/L  FrStHsLb   Glucose 119 70 - 99 mg/dL H FrStHsLb   Urea Nitrogen 42 7 - 30 mg/dL H FrStHsLb   Creatinine 2.06 0.52 - 1.04 mg/dL H FrStHsLb   GFR Estimate 24 >60 mL/min/1.7m2 L FrStHsLb   Comment:  Non  GFR Calc   GFR Estimate If Black 29 >60 mL/min/1.7m2 L FrStHsLb   Comment:  African American GFR Calc   Calcium 8.1 8.5 - 10.1 mg/dL L FrStHsLb            CBC with platelets [[id number]] (Abnormal)  Resulted: 08/20/18 0746, Result status: Final result    Ordering provider: Ying Conner MD  08/20/18 0000 Resulting lab: [facility]    Specimen Information    Type Source Collected On   Blood  08/20/18 0723          Components       Value Reference Range Flag Lab   WBC 7.6 4.0 - 11.0 10e9/L  FrStHsLb   RBC Count 3.33 3.8 - 5.2 10e12/L L FrStHsLb   Hemoglobin 9.3 11.7 - 15.7 g/dL L FrStHsLb   Hematocrit 28.9 35.0 - 47.0 % L FrStHsLb   MCV 87 78 - 100 fl  FrStHsLb   MCH 27.9 26.5 - 33.0 pg  FrStHsLb   MCHC 32.2 31.5 - 36.5 g/dL  FrStHsLb   RDW 15.3 10.0 - 15.0 % H FrStHsLb   Platelet Count 312 150 - 450 10e9/L  FrStHsLb            Blood culture [[id number]]  Resulted: 08/20/18 0732, Result status: Preliminary result    Ordering provider: Aristides Carrasco MD  08/15/18 2346 Resulting lab: [facility]    Specimen Information    Type Source Collected On   Blood Blood 08/16/18 0035   Comment:  Right Arm          Components       Value Reference Range Flag Lab   Specimen Description Blood Right Arm      Special Requests Aerobic and anaerobic bottles received   FrStHsLb   Culture Micro No growth after 4 days   75            Glucose by meter [[id number]] (Abnormal)  Resulted: 08/20/18 0114, Result status: Final result    Ordering provider: Geoff Saldana MD   08/20/18 0102 Resulting lab: POINT OF CARE TEST, GLUCOSE    Specimen Information    Type Source Collected On     08/20/18 0102          Components       Value Reference Range Flag Lab   Glucose 148 70 - 99 mg/dL H 170   Comment:  /RN Notified            Glucose by meter [[id number]] (Abnormal)  Resulted: 08/19/18 2147, Result status: Final result    Ordering provider: Geoff Saldana MD  08/19/18 2122 Resulting lab: POINT OF CARE TEST, GLUCOSE    Specimen Information    Type Source Collected On     08/19/18 2122          Components       Value Reference Range Flag Lab   Glucose 137 70 - 99 mg/dL H 170            Glucose by meter [[id number]] (Abnormal)  Resulted: 08/19/18 1722, Result status: Final result    Ordering provider: Geoff Saldana MD  08/19/18 1710 Resulting lab: POINT OF CARE TEST, GLUCOSE    Specimen Information    Type Source Collected On     08/19/18 1710          Components       Value Reference Range Flag Lab   Glucose 139 70 - 99 mg/dL H 170            Glucose by meter [[id number]] (Abnormal)  Resulted: 08/19/18 0823, Result status: Final result    Ordering provider: Geoff Saldana MD  08/19/18 0805 Resulting lab: POINT OF CARE TEST, GLUCOSE    Specimen Information    Type Source Collected On     08/19/18 0805          Components       Value Reference Range Flag Lab   Glucose 122 70 - 99 mg/dL H 170            Basic metabolic panel [[id number]] (Abnormal)  Resulted: 08/19/18 0818, Result status: Final result    Ordering provider: Ying Conner MD  08/19/18 0000 Resulting lab: [facility]    Specimen Information    Type Source Collected On   Blood  08/19/18 0750          Components       Value Reference Range Flag Lab   Sodium 139 133 - 144 mmol/L  FrStHsLb   Potassium 4.7 3.4 - 5.3 mmol/L  FrStHsLb   Chloride 109 94 - 109 mmol/L  FrStHsLb   Carbon Dioxide 22 20 - 32 mmol/L  FrStHsLb   Anion Gap 8 3 - 14 mmol/L  FrStHsLb   Glucose 134 70 - 99 mg/dL H FrStHsLb   Urea  Nitrogen 42 7 - 30 mg/dL H FrStHsLb   Creatinine 2.21 0.52 - 1.04 mg/dL H FrStHsLb   GFR Estimate 22 >60 mL/min/1.7m2 L FrStHsLb   Comment:  Non  GFR Calc   GFR Estimate If Black 26 >60 mL/min/1.7m2 L FrStHsLb   Comment:  African American GFR Calc   Calcium 8.5 8.5 - 10.1 mg/dL  FrStHsLb            Platelet count [[id number]]  Resulted: 08/19/18 0802, Result status: Final result    Ordering provider: Geoff Saldana MD  08/19/18 0000 Resulting lab: [facility]    Specimen Information    Type Source Collected On   Blood  08/19/18 0750          Components       Value Reference Range Flag Lab   Platelet Count 293 150 - 450 10e9/L  FrStHsLb            Glucose by meter [[id number]] (Abnormal)  Resulted: 08/19/18 0226, Result status: Final result    Ordering provider: Geoff Saldana MD  08/19/18 0205 Resulting lab: POINT OF CARE TEST, GLUCOSE    Specimen Information    Type Source Collected On     08/19/18 0205          Components       Value Reference Range Flag Lab   Glucose 129 70 - 99 mg/dL H 170   Comment:  Dr/RN Notified            Sodium random urine [[id number]]  Resulted: 08/18/18 2236, Result status: Final result    Ordering provider: Ying Conner MD  08/18/18 1350 Resulting lab: [facility]    Specimen Information    Type Source Collected On   Urine Urine clean catch 08/18/18 2015          Components       Value Reference Range Flag Lab   Sodium Urine mmol/L 29 mmol/L  FrStHsLb            Glucose by meter [[id number]] (Abnormal)  Resulted: 08/18/18 2118, Result status: Final result    Ordering provider: Geoff Saldana MD  08/18/18 2105 Resulting lab: POINT OF CARE TEST, GLUCOSE    Specimen Information    Type Source Collected On     08/18/18 2105          Components       Value Reference Range Flag Lab   Glucose 161 70 - 99 mg/dL H 170            Glucose by meter [[id number]] (Abnormal)  Resulted: 08/18/18 1708, Result status: Final result    Ordering  provider: Geoff Saldana MD  08/18/18 1656 Resulting lab: POINT OF CARE TEST, GLUCOSE    Specimen Information    Type Source Collected On     08/18/18 1656          Components       Value Reference Range Flag Lab   Glucose 154 70 - 99 mg/dL H 170            Basic metabolic panel [[id number]] (Abnormal)  Resulted: 08/18/18 1229, Result status: Final result    Ordering provider: Ying Conner MD  08/18/18 1059 Resulting lab: [facility]    Specimen Information    Type Source Collected On   Blood  08/18/18 1156          Components       Value Reference Range Flag Lab   Sodium 141 133 - 144 mmol/L  FrStHsLb   Potassium 4.5 3.4 - 5.3 mmol/L  FrStHsLb   Chloride 109 94 - 109 mmol/L  FrStHsLb   Carbon Dioxide 22 20 - 32 mmol/L  FrStHsLb   Anion Gap 10 3 - 14 mmol/L  FrStHsLb   Glucose 120 70 - 99 mg/dL H FrStHsLb   Urea Nitrogen 38 7 - 30 mg/dL H FrStHsLb   Creatinine 2.10 0.52 - 1.04 mg/dL H FrStHsLb   GFR Estimate 23 >60 mL/min/1.7m2 L FrStHsLb   Comment:  Non  GFR Calc   GFR Estimate If Black 28 >60 mL/min/1.7m2 L FrStHsLb   Comment:  African American GFR Calc   Calcium 8.4 8.5 - 10.1 mg/dL L FrStHsLb            Glucose by meter [[id number]] (Abnormal)  Resulted: 08/18/18 1142, Result status: Final result    Ordering provider: Geoff Saldana MD  08/18/18 1130 Resulting lab: POINT OF CARE TEST, GLUCOSE    Specimen Information    Type Source Collected On     08/18/18 1130          Components       Value Reference Range Flag Lab   Glucose 116 70 - 99 mg/dL H 170            Glucose by meter [[id number]] (Abnormal)  Resulted: 08/18/18 1132, Result status: Final result    Ordering provider: Geoff Saldana MD  08/18/18 1119 Resulting lab: POINT OF CARE TEST, GLUCOSE    Specimen Information    Type Source Collected On     08/18/18 1119          Components       Value Reference Range Flag Lab   Glucose 126 70 - 99 mg/dL H 170            Glucose by meter [[id number]] (Abnormal)   Resulted: 08/18/18 0832, Result status: Final result    Ordering provider: Geoff Saldana MD  08/18/18 0724 Resulting lab: POINT OF CARE TEST, GLUCOSE    Specimen Information    Type Source Collected On     08/18/18 0724          Components       Value Reference Range Flag Lab   Glucose 107 70 - 99 mg/dL H 170            Glucose by meter [[id number]] (Abnormal)  Resulted: 08/18/18 0204, Result status: Final result    Ordering provider: Geoff Saldana MD  08/18/18 0143 Resulting lab: POINT OF CARE TEST, GLUCOSE    Specimen Information    Type Source Collected On     08/18/18 0143          Components       Value Reference Range Flag Lab   Glucose 108 70 - 99 mg/dL H 170   Comment:  Dr/RN Notified            Glucose by meter [[id number]] (Abnormal)  Resulted: 08/17/18 2214, Result status: Final result    Ordering provider: Geoff Saldana MD  08/17/18 2140 Resulting lab: POINT OF CARE TEST, GLUCOSE    Specimen Information    Type Source Collected On     08/17/18 2140          Components       Value Reference Range Flag Lab   Glucose 125 70 - 99 mg/dL H 170            Glucose by meter [[id number]] (Abnormal)  Resulted: 08/17/18 1724, Result status: Final result    Ordering provider: Geoff Saldana MD  08/17/18 1709 Resulting lab: POINT OF CARE TEST, GLUCOSE    Specimen Information    Type Source Collected On     08/17/18 1709          Components       Value Reference Range Flag Lab   Glucose 118 70 - 99 mg/dL H 170            Glucose by meter [[id number]] (Abnormal)  Resulted: 08/17/18 1202, Result status: Final result    Ordering provider: Geoff Saldana MD  08/17/18 1125 Resulting lab: POINT OF CARE TEST, GLUCOSE    Specimen Information    Type Source Collected On     08/17/18 1125          Components       Value Reference Range Flag Lab   Glucose 132 70 - 99 mg/dL H 170            Glucose by meter [[id number]] (Abnormal)  Resulted: 08/17/18 0853, Result status: Final result    Ordering provider:  Geoff Saldana MD  08/17/18 0816 Resulting lab: POINT OF CARE TEST, GLUCOSE    Specimen Information    Type Source Collected On     08/17/18 0816          Components       Value Reference Range Flag Lab   Glucose 111 70 - 99 mg/dL H 170            Basic metabolic panel [[id number]] (Abnormal)  Resulted: 08/17/18 0753, Result status: Final result    Ordering provider: Geoff Saldana MD  08/17/18 0000 Resulting lab: [facility]    Specimen Information    Type Source Collected On   Blood  08/17/18 0722          Components       Value Reference Range Flag Lab   Sodium 141 133 - 144 mmol/L  FrStHsLb   Potassium 4.3 3.4 - 5.3 mmol/L  FrStHsLb   Chloride 110 94 - 109 mmol/L H FrStHsLb   Carbon Dioxide 22 20 - 32 mmol/L  FrStHsLb   Anion Gap 9 3 - 14 mmol/L  FrStHsLb   Glucose 110 70 - 99 mg/dL H FrStHsLb   Urea Nitrogen 32 7 - 30 mg/dL H FrStHsLb   Creatinine 1.86 0.52 - 1.04 mg/dL H FrStHsLb   GFR Estimate 27 >60 mL/min/1.7m2 L FrStHsLb   Comment:  Non  GFR Calc   GFR Estimate If Black 32 >60 mL/min/1.7m2 L FrStHsLb   Comment:  African American GFR Calc   Calcium 8.4 8.5 - 10.1 mg/dL L FrStHsLb            CBC with platelets [[id number]] (Abnormal)  Resulted: 08/17/18 0737, Result status: Final result    Ordering provider: Geoff Saldana MD  08/17/18 0000 Resulting lab: [facility]    Specimen Information    Type Source Collected On   Blood  08/17/18 0722          Components       Value Reference Range Flag Lab   WBC 11.8 4.0 - 11.0 10e9/L H FrStHsLb   RBC Count 3.15 3.8 - 5.2 10e12/L L FrStHsLb   Hemoglobin 8.7 11.7 - 15.7 g/dL L FrStHsLb   Hematocrit 27.5 35.0 - 47.0 % L FrStHsLb   MCV 87 78 - 100 fl  FrStHsLb   MCH 27.6 26.5 - 33.0 pg  FrStHsLb   MCHC 31.6 31.5 - 36.5 g/dL  FrStHsLb   RDW 15.6 10.0 - 15.0 % H FrStHsLb   Platelet Count 227 150 - 450 10e9/L  FrStHsLb            Testing Performed By     Lab - Abbreviation Name Director Address Valid Date Range     14 - FrStHsLb [facility] Unknown [address] [place] [address] 05/08/15 1057 - Present    75 - Unknown [facility] Unknown [address] [facility] [address] 01/15/15 1019 - Present    170 - Unknown POINT OF CARE TEST, GLUCOSE Unknown Unknown 10/31/11 1114 - Present            Encounter-Level Documents:     There are no encounter-level documents.      Order-Level Documents:     There are no order-level documents.

## 2018-08-15 NOTE — IP AVS SNAPSHOT
[address], [address]    [place] [address]    Phone:  [phone]                                       After Visit Summary   8/15/2018    Amber Hull    MRN: [id number]           After Visit Summary Signature Page     I have received my discharge instructions, and my questions have been answered. I have discussed any challenges I see with this plan with the nurse or doctor.    ..........................................................................................................................................  Patient/Patient Representative Signature      ..........................................................................................................................................  Patient Representative Print Name and Relationship to Patient    ..................................................               ................................................  Date                                            Time    ..........................................................................................................................................  Reviewed by Signature/Title    ...................................................              ..............................................  Date                                                            Time

## 2018-08-15 NOTE — IP AVS SNAPSHOT
` [place] [address] ONCOLOGY: [phone]            Medication Administration Report for Amber Hull as of 08/20/18 0784   Legend:    Given Hold Not Given Due Canceled Entry Other Actions    Time Time (Time) Time  Time-Action       Inactive    Active    Linked        Medications 08/14/18 08/15/18 08/16/18 08/17/18 08/18/18 08/19/18 08/20/18    acetaminophen (TYLENOL) tablet 650 mg  Dose: 650 mg  Freq: EVERY 4 HOURS PRN Route: PO  PRN Reason: mild pain  Start: 08/16/18 0325   Admin Instructions: Alternate ibuprofen (if ordered) with acetaminophen.  Maximum acetaminophen dose from all sources = 75 mg/kg/day not to exceed 4 grams/day.    Admin. Amount: 2 tablet (2 × 325 mg tablet)  Last Admin: 08/20/18 1133  Dispense Loc:  [facility]       0844 (650 mg)-Given        1746 (650 mg)-Given       2152 (650 mg)-Given         0651 (650 mg)-Given [C]       1225 (650 mg)-Given        1133 (650 mg)-Given           albuterol neb solution 2.5 mg  Dose: 3 mL  Freq: EVERY 2 HOURS PRN Route: NEBULIZATION  PRN Reasons: wheezing,shortness of breath / dyspnea  Start: 08/16/18 1031   Admin. Amount: 2.5 mg = 3 mL Conc: 2.5 mg/3 mL  Dispense Loc:  [facility]  Volume: 3 mL               amLODIPine (NORVASC) tablet 10 mg  Dose: 10 mg  Freq: DAILY Route: PO  Start: 08/16/18 0900   Admin. Amount: 1 tablet (1 × 10 mg tablet)  Last Admin: 08/20/18 0826  Dispense Loc:  [facility]       0845 (10 mg)-Given        0859 (10 mg)-Given        0810 (10 mg)-Given        0748 (10 mg)-Given               0826 (10 mg)-Given           aspirin EC tablet 325 mg  Dose: 325 mg  Freq: DAILY Route: PO  Start: 08/16/18 0900   Admin Instructions: DO NOT CRUSH.    Admin. Amount: 1 tablet (1 × 325 mg tablet)  Last Admin: 08/20/18 0830  Dispense Loc:  [facility]       0844 (325 mg)-Given        0859 (325 mg)-Given        0811 (325 mg)-Given        0748 (325 mg)-Given               0830 (325 mg)-Given           atorvastatin (LIPITOR) tablet 10 mg  Dose:  10 mg  Freq: DAILY Route: PO  Start: 08/16/18 0900   Admin. Amount: 1 tablet (1 × 10 mg tablet)  Last Admin: 08/20/18 0825  Dispense Loc: [facility]       0844 (10 mg)-Given        0859 (10 mg)-Given        0811 (10 mg)-Given        0748 (10 mg)-Given               0825 (10 mg)-Given           bisacodyl (DULCOLAX) Suppository 10 mg  Dose: 10 mg  Freq: DAILY PRN Route: RE  PRN Reason: constipation  Start: 08/16/18 0325   Admin Instructions: Hold for loose stools.  This is the third step of a three step constipation treatment.    Admin. Amount: 1 suppository (1 × 10 mg suppository)  Dispense Loc:  [facility]               citalopram (celeXA) tablet 10 mg  Dose: 10 mg  Freq: DAILY Route: PO  Start: 08/16/18 1100   Admin. Amount: 1 tablet (1 × 10 mg tablet)  Last Admin: 08/20/18 0826  Dispense Loc: [facility]       1217 (10 mg)-Given        0900 (10 mg)-Given        0810 (10 mg)-Given        0748 (10 mg)-Given               0826 (10 mg)-Given           glucose gel 15-30 g  Dose: 15-30 g  Freq: EVERY 15 MIN PRN Route: PO  PRN Reason: low blood sugar  Start: 08/16/18 0325   Admin Instructions: Give 15 g for Blood Glucose 51 to 69 mg/dL IF patient is conscious and able to swallow.  Give 30 g for Blood Glucose less than or equal to 50 mg/dL. IF patient is conscious and able to swallow.  Do NOT give glucose gel via enteral tube.  IF patient has enteral tube: give apple juice 120 mL (4 oz or 15 g of CHO) via enteral tube for Blood Glucose 51 to 69 mg/dL.  Give apple juice 240 mL (8 oz or 30 g of CHO) via enteral tube for Blood Glucose less than or equal to 50 mg/dL.    ~Oral gel is preferable for conscious and able to swallow patient.   ~IF gel unavailable or patient refuses may provide apple juice 120 mL (4 oz or 15 g of CHO). Document juice on I and O flowsheet.    Admin. Amount: 15-30 g  Dispense Loc: [facility]  Volume: 93.75 mL              Or  dextrose 10% BOLUS  Dose: 2 mL/kg  Weight Dosing Info: 90.7 kg  Freq: EVERY 15  MIN PRN Route: IV  PRN Comment: Repeat until glucose value greater than 70 mg/dL  Start: 08/16/18 0325   Admin Instructions: Use for hypoglycemia with glucose less than or equal to 50 mg/dL in the NPO or unconscious/not alert patient with IV access.    Admin. Amount: 181 mL  Dispense Loc: [facility]  Volume: 500 mL              Or  glucagon injection 0.5-1 mg  Dose: 0.5-1 mg  Freq: EVERY 15 MIN PRN Route: SC  PRN Reason: low blood sugar  PRN Comment: May repeat x 1 only.    Start: 08/16/18 0325   Admin Instructions: May give SQ or IM. Dose 0.5 mg if LESS than 5 years old, 1 mg if 5 years or older.    ONLY use glucagon IF patient has NO IV access AND is unable/unwilling to swallow AND blood glucose LESS than or EQUAL to 50 mg/dL.  If ordered IV, give IV Push over 1 minute. Reconstitute with 1mL sterile water.    Admin. Amount: 0.5-1 mg  Dispense Loc: [facility]               guanFACINE (TENEX) tablet 2 mg  Dose: 2 mg  Freq: DAILY Route: PO  Start: 08/16/18 0900   Admin. Amount: 2 tablet (2 × 1 mg tablet)  Last Admin: 08/20/18 0826  Dispense Loc: [facility]       1050 (2 mg)-Given        0900 (2 mg)-Given        0811 (2 mg)-Given        0748 (2 mg)-Given               0826 (2 mg)-Given           heparin sodium PF injection 5,000 Units  Dose: 5,000 Units  Freq: EVERY 12 HOURS Route: SC  Start: 08/16/18 0330   Admin Instructions: HOLD heparin IF platelet count falls below 50% baseline or less than 100,000 /  L and notify provider. Use this product If CrCl less than 30 mL/min.  High concentration heparin. Not for line flush or cath care.    Admin. Amount: 5,000 Units = 0.5 mL Conc: 5,000 Units/0.5 mL  Last Admin: 08/20/18 1537  Dispense Loc: [facility]  Volume: 0.5 mL       0413 (5,000 Units)-Given       1604 (5,000 Units)-Given        0420 (5,000 Units)-Given       1736 (5,000 Units)-Given        0335 (5,000 Units)-Given       1642 (5,000 Units)-Given        0443 (5,000 Units)-Given       1619 (5,000  Units)-Given        0458 (5,000 Units)-Given       1537 (5,000 Units)-Given           hydrALAZINE (APRESOLINE) injection 10 mg  Dose: 10 mg  Freq: EVERY 4 HOURS PRN Route: IV  PRN Reason: high blood pressure  PRN Comment: give for SBP > 180 or dbp > 100  Start: 08/16/18 1457   Admin Instructions: For ordered doses up to 40 mg, give IV Push undiluted over 1 minute.    Admin. Amount: 10 mg = 0.5 mL Conc: 20 mg/mL  Dispense Loc: [facility]  Volume: 0.5 mL               hydrALAZINE (APRESOLINE) tablet 10 mg  Dose: 10 mg  Freq: 3 TIMES DAILY Route: PO  Start: 08/16/18 0900   Admin. Amount: 1 tablet (1 × 10 mg tablet)  Last Admin: 08/20/18 1538  Dispense Loc: [facility]       0844 (10 mg)-Given       1604 (10 mg)-Given       2159 (10 mg)-Given        0900 (10 mg)-Given       1736 (10 mg)-Given       2143 (10 mg)-Given        0811 (10 mg)-Given       1642 (10 mg)-Given       2106 (10 mg)-Given        0747 (10 mg)-Given                     1619 (10 mg)-Given       2104 (10 mg)-Given        0826 (10 mg)-Given       1538 (10 mg)-Given       [ ] 2200           insulin aspart (NovoLOG) inj (RAPID ACTING)  Dose: 1-5 Units  Freq: AT BEDTIME Route: SC  Start: 08/16/18 0330   Admin Instructions: MEDIUM INSULIN RESISTANCE DOSING    Do Not give Bedtime Correction Insulin if BG less than  200.   For  - 249 give 1 units.   For  - 299 give 2 units.   For  - 349 give 3 units.   For  -399 give 4 units.   For BG greater than or equal to 400 give 5 units.  Notify provider if glucose greater than or equal to 350 mg/dL after administration of correction dose.  If given at mealtime, must be administered 5 min before meal or immediately after.    Admin. Amount: 1-5 Units  Dispense Loc: Contact Rx for dose  Volume: 3 mL       (0412)-Not Given       (2148)-Not Given        (2141)-Not Given [C]        (2105)-Not Given        (2123)-Not Given        [ ] 2200           insulin aspart (NovoLOG) inj (RAPID ACTING)  Dose: 1-7  Units  Freq: 3 TIMES DAILY BEFORE MEALS Route: SC  Start: 08/16/18 0730   Admin Instructions: Correction Scale - MEDIUM INSULIN RESISTANCE DOSING     Do Not give Correction Insulin if Pre-Meal BG less than 140.   For Pre-Meal  - 189 give 1 unit.   For Pre-Meal  - 239 give 2 units.   For Pre-Meal  - 289 give 3 units.   For Pre-Meal  - 339 give 4 units.   For Pre-Meal - 399 give 5 units.   For Pre-Meal -449 give 6 units  For Pre-Meal BG greater than or equal to 450 give 7 units.   To be given with prandial insulin, and based on pre-meal blood glucose.    Notify provider if glucose greater than or equal to 350 mg/dL after administration of correction dose.  If given at mealtime, must be administered 5 min before meal or immediately after.    Admin. Amount: 1-7 Units  Last Admin: 08/18/18 1742  Dispense Loc: Contact Rx for dose  Volume: 3 mL       (0904)-Not Given       (1113)-Not Given       1714 (1 Units)-Given        (0858)-Not Given       (1126)-Not Given       (1737)-Not Given        (0753)-Not Given       (1139)-Not Given [C]       1742 (1 Units)-Given        (0807)-Not Given [C]       (1142)-Not Given [C]       (1716)-Not Given        (0835)-Not Given       (1204)-Not Given       [ ] 1700           insulin aspart (NovoLOG) inj (RAPID ACTING)  Dose: 5 Units  Freq: 3 TIMES DAILY WITH MEALS Route: SC  Start: 08/16/18 0900   Admin Instructions: If given at mealtime, must be administered 5 min before meal or immediately after.    Admin. Amount: 5 Units  Last Admin: 08/20/18 1203  Dispense Loc: Contact Rx for dose  Volume: 3 mL       (1043)-Not Given [C]       1217 (5 Units)-Given       1714 (5 Units)-Given        0858 (5 Units)-Given       1126 (5 Units)-Given       1749 (5 Units)-Given        0752 (5 Units)-Given       1149 (5 Units)-Given       1742 (5 Units)-Given        0807 (5 Units)-Given       1142 (5 Units)-Given       1732 (5 Units)-Given        0836 (5 Units)-Given        1203 (5 Units)-Given       [ ] 1700           insulin glargine (LANTUS) injection 30 Units  Dose: 30 Units  Freq: AT BEDTIME Route: SC  Start: 08/16/18 0330   Admin. Amount: 30 Units  Last Admin: 08/19/18 2123  Dispense Loc:  Main Pharmacy  Volume: 0.3 mL       0428 (30 Units)-Given       2159 (30 Units)-Given        2143 (30 Units)-Given        2109 (30 Units)-Given        2123 (30 Units)-Given        [ ] 2200           magnesium hydroxide (MILK OF MAGNESIA) suspension 30 mL  Dose: 30 mL  Freq: DAILY PRN Route: PO  PRN Reason: constipation  Start: 08/16/18 0325   Admin Instructions: Hold for loose stools.  This is the second step of a three step constipation treatment.    Admin. Amount: 30 mL  Dispense Loc:  [facility]  Volume: 30 mL               melatonin tablet 1 mg  Dose: 1 mg  Freq: AT BEDTIME PRN Route: PO  PRN Reason: sleep  Start: 08/16/18 0325   Admin Instructions: Do not give unless at least 6 hours of uninterrupted sleep is expected.    Admin. Amount: 1 tablet (1 × 1 mg tablet)  Dispense Loc:  [facility]               naloxone (NARCAN) injection 0.1-0.4 mg  Dose: 0.1-0.4 mg  Freq: EVERY 2 MIN PRN Route: IV  PRN Reason: opioid reversal  Start: 08/16/18 0325   Admin Instructions: For respiratory rate LESS than or EQUAL to 8.  Partial reversal dose:  0.1 mg titrated q 2 minutes for Analgesia Side Effects Monitoring Sedation Level of 3 (frequently drowsy, arousable, drifts to sleep during conversation).Full reversal dose:  0.4 mg bolus for Analgesia Side Effects Monitoring Sedation Level of 4 (somnolent, minimal or no response to stimulation).  For ordered doses up to 2mg give IVP. Give each 0.4mg over 15 seconds in emergency situations. For non-emergent situations further dilute in 9mL of NS to facilitate titration of response.    Admin. Amount: 0.1-0.4 mg = 0.25-1 mL Conc: 0.4 mg/mL  Dispense Loc:  [facility]  Volume: 1 mL               nystatin (MYCOSTATIN) cream  Freq: 2 TIMES DAILY Route: Top  Start:  08/16/18 1100   Admin Instructions: Apply to groin, abdominal folds, and under both breasts    Last Admin: 08/20/18 0837  Dispense Loc:  Main Pharmacy       (4621)-Not Given       2005 ( )-Given        0901 ( )-Given       2154 ( )-Given        (0813)-Not Given       2105 ( )-Given        (0808)-Not Given       (2123)-Not Given        0837 ( )-Given       [ ] 2100           ondansetron (ZOFRAN-ODT) ODT tab 4 mg  Dose: 4 mg  Freq: EVERY 6 HOURS PRN Route: PO  PRN Reasons: nausea,vomiting  Start: 08/16/18 0325   Admin Instructions: This is Step 1 of nausea and vomiting management.  If nausea not resolved in 15 minutes, go to Step 2 prochlorperazine (COMPAZINE). Do not push through foil backing. Peel back foil and gently remove. Place on tongue immediately. Administration with liquid unnecessary  With dry hands, peel back foil backing and gently remove tablet; do not push oral disintegrating tablet through foil backing; administer immediately on tongue and oral disintegrating tablet dissolves in seconds; then swallow with saliva; liquid not required.    Admin. Amount: 1 tablet (1 × 4 mg tablet)  Dispense Loc:  [facility]              Or  ondansetron (ZOFRAN) injection 4 mg  Dose: 4 mg  Freq: EVERY 6 HOURS PRN Route: IV  PRN Reasons: nausea,vomiting  Start: 08/16/18 0325   Admin Instructions: This is Step 1 of nausea and vomiting management.  If nausea not resolved in 15 minutes, go to Step 2 prochlorperazine (COMPAZINE).  Irritant. For ordered doses up to 4 mg, give IV Push undiluted over 2-5 minutes.    Admin. Amount: 4 mg = 2 mL Conc: 4 mg/2 mL  Dispense Loc:  [facility]  Infused Over: 2-5 Minutes  Volume: 2 mL               piperacillin-tazobactam (ZOSYN) 2.25 g vial to attach to  ml bag  Dose: 2.25 g  Freq: EVERY 6 HOURS Route: IV  Indications of Use: SKIN AND SOFT TISSUE INFECTION  Last Dose: 2.25 g (08/20/18 1204)  Start: 08/19/18 1130   Admin Instructions: Renally dosed for crcl 20-40 ml/min    Admin.  Amount: 2.25 g  Last Admin: 08/20/18 1204  Dispense Loc:  [facility]  Infused Over: 30 Minutes  Volume: 10 mL          1142 (2.25 g)-New Bag       1653 (2.25 g)-New Bag       2325 (2.25 g)-New Bag [C]        0458 (2.25 g)-New Bag       1204 (2.25 g)-New Bag       [ ] 1730       [ ] 2330           polyethylene glycol (MIRALAX/GLYCOLAX) Packet 17 g  Dose: 17 g  Freq: DAILY PRN Route: PO  PRN Reason: constipation  Start: 08/16/18 0325   Admin Instructions: 1 Packet = 17 grams. Mixed prescribed dose in 8 ounces of water.<br>If the patient has multiple PO bowel stimulant agents ordered PRN, offer in the following order per policy.  Move to the next available step if the earlier step is ineffective.<br>Step 1 - senna; Step 2 - bisacodyl; Step 3 - milk of magnesia; Step 4 - polyethylene glycol; Step 5 - magnesium citrate.  1 Packet = 17 grams. Mixed prescribed dose in 8 ounces of water. Follow with 8 oz. of water.    Admin. Amount: 17 g  Dispense Loc: [facility]               senna-docusate (SENOKOT-S;PERICOLACE) 8.6-50 MG per tablet 1 tablet  Dose: 1 tablet  Freq: 2 TIMES DAILY Route: PO  Start: 08/16/18 0900   Admin Instructions: If no bowel movement in 24 hours, increase to 2 tablets PO.  Hold for loose stools.    Admin. Amount: 1 tablet  Last Admin: 08/19/18 0747  Dispense Loc: [facility]       0844 (1 tablet)-Given                      (2142)-Not Given [C]               (2105)-Not Given        0747 (1 tablet)-Given              (2302)-Not Given        (1022)-Not Given       [ ] 2100          Or  senna-docusate (SENOKOT-S;PERICOLACE) 8.6-50 MG per tablet 2 tablet  Dose: 2 tablet  Freq: 2 TIMES DAILY Route: PO  Start: 08/16/18 0900   Admin Instructions: Hold for loose stools.    Admin. Amount: 2 tablet  Last Admin: 08/18/18 0810  Dispense Loc: [facility]              2005 (2 tablet)-Given        0857 (2 tablet)-Given               0810 (2 tablet)-Given                                            [ ] 2100            senna-docusate (SENOKOT-S;PERICOLACE) 8.6-50 MG per tablet 1 tablet  Dose: 1 tablet  Freq: 2 TIMES DAILY PRN Route: PO  PRN Reason: constipation  Start: 18   Admin Instructions: If no bowel movement in 24 hours, increase to 2 tablets PO.  Hold for loose stools.  This is the first step of a three step constipation treatment.    Admin. Amount: 1 tablet  Dispense Loc:  [facility]              Or  senna-docusate (SENOKOT-S;PERICOLACE) 8.6-50 MG per tablet 2 tablet  Dose: 2 tablet  Freq: 2 TIMES DAILY PRN Route: PO  PRN Reason: constipation  Start: 18   Admin Instructions: Hold for loose stools.  This is the first step of a three step constipation treatment.    Admin. Amount: 2 tablet  Dispense Loc: [facility]              Completed Medications  Medications 08/14/18 08/15/18 08/16/18 08/17/18 08/18/18 08/19/18 08/20/18         Dose: 1,000 mL  Freq: ONCE Route: IV  Last Dose: 1,000 mL (18 0921)  Start: 18 0900   End: 18 1121   Admin. Amount: 1,000 mL  Last Admin: 18  Dispense Loc: [facility]  Infused Over: 2 Hours  Administrations Remainin  Volume: 1,000 mL          0921 (1,000 mL)-New Bag           Discontinued Medications  Medications 08/14/18 08/15/18 08/16/18 08/17/18 08/18/18 08/19/18 08/20/18         Dose: 650 mg  Freq: 3 TIMES DAILY PRN Route: PO  PRN Reason: mild pain  Start: 18   End: 18 1105   Admin Instructions: Maximum acetaminophen dose from all sources = 75 mg/kg/day not to exceed 4 grams/day.    Admin. Amount: 2 tablet (2 × 325 mg tablet)  Last Admin: 18 164  Dispense Loc:  [facility]         0815 (650 mg)-Given       164 (650 mg)-Given         1105-Med Discontinued         Dose: 3.375 g  Freq: EVERY 6 HOURS Route: IV  Indications of Use: SKIN AND SOFT TISSUE INFECTION  Start: 18 0330   End: 18 1038   Admin. Amount: 3.375 g  Last Admin: 18 0443  Dispense Loc: [facility]  Infused Over: 30 Minutes  Volume: 15  mL       0413 (3.375 g)-New Bag       1051 (3.375 g)-New Bag       1603 (3.375 g)-New Bag       2159 (3.375 g)-New Bag        0420 (3.375 g)-New Bag       1114 (3.375 g)-New Bag       1735 (3.375 g)-New Bag       2136 (3.375 g)-New Bag        0335 (3.375 g)-New Bag       1146 (3.375 g)-New Bag       1714 (3.375 g)-New Bag       2355 (3.375 g)-New Bag        0443 (3.375 g)-New Bag       1038-Med Discontinued

## 2018-08-15 NOTE — IP AVS SNAPSHOT
MRN:[id number]                      After Visit Summary   8/15/2018    Amber Hull    MRN: [id number]           Thank you!     Thank you for choosing [facility] for your care. Our goal is always to provide you with excellent care. Hearing back from our patients is one way we can continue to improve our services. Please take a few minutes to complete the written survey that you may receive in the mail after you visit with us. Thank you!        Patient Information     Date Of Birth          1945        About your hospital stay     You were admitted on:  August 16, 2018 You last received care in the:  [place] [facility] [address] [facility]    You were discharged on:  August 20, 2018        Reason for your hospital stay       Cellulitis from venous stasis/fluid overload                  Who to Call     For medical emergencies, please call 911.  For non-urgent questions about your medical care, please call your primary care provider or clinic, [phone]          Attending Provider     Provider Specialty    Aristides Carrasco MD Emergency Medicine    [facility], Geoff Osuna MD Internal Medicine       Primary Care Provider Office Phone # Fax #    [facility] [phone]      After Care Instructions     Activity - Up with nursing assistance           Advance Diet as Tolerated       Follow this diet upon discharge: Orders Placed This Encounter      Room Service      Moderate Consistent CHO Diet            Daily weights       Call Provider for weight gain of more than 2 pounds per day or 5 pounds per week.            Fall precautions           General info for SNF       Length of Stay Estimate: Long Term Care  Condition at Discharge: Improving  Level of care:board and care  Rehabilitation Potential: Fair  Admission H&P remains valid and up-to-date: Yes  Recent Chemotherapy: N/A  Use Nursing Home Standing Orders: Yes            Glucose monitor nursing POCT       Before  "meals and at bedtime            Intake and output       Every shift            Mantoux instructions       Give two-step Mantoux (PPD) Per Facility Policy Yes                  Follow-up Appointments     Follow Up and recommended labs and tests       Follow up with jail physician.  The following labs/tests are recommended: basic metabolic panel in 3-4 days, monitor creatinine.                  Additional Services     Occupational Therapy Adult Consult       Evaluate and treat as clinically indicated.    Reason:  Recent hospitalization            Physical Therapy Adult Consult       Evaluate and treat as clinically indicated.    Reason:  Lower extremity edema, deconditioning , patient  Will benefit from lymphedema therapy                  Further instructions from your care team       You are [facility] to: [facility]  Phone:  [phone]        Pending Results     Date and Time Order Name Status Description    8/15/2018 2346 Blood culture Preliminary             Statement of Approval     Ordered          08/20/18 1130  I have reviewed and agree with all the recommendations and orders detailed in this document.  EFFECTIVE NOW     Approved and electronically signed by:  Ying Conner MD             Admission Information     Date & Time Provider Department Dept. Phone    8/15/2018 Geoff Saldana MD [place] [address] [facility] [phone]      Your Vitals Were     Blood Pressure Temperature Respirations Height Weight Pulse Oximetry    153/63 96.5  F (35.8  C) (Oral) 16 1.6 m (5' 3\") 117.5 kg (259 lb) 97%    BMI (Body Mass Index)                   45.88 kg/m2           MyChart Information     [facility] lets you send messages to your doctor, view your test results, renew your prescriptions, schedule appointments and more. To sign up, go to [email or web address] . Click on \"Log in\" on the left side of the screen, which will take you to the Welcome page. Then click on \"Sign up Now\" on " the right side of the page.     You will be asked to enter the access code listed below, as well as some personal information. Please follow the directions to create your username and password.     Your access code is: [email or web address]  Expires: 2018 11:55 AM     Your access code will  in 90 days. If you need help or a new code, please call your [place] clinic or [phone].        Care EveryWhere ID     This is your Care EveryWhere ID. This could be used by other organizations to access your [place] medical records  [id number]        Equal Access to Services     [facility]: Hadii leonidas Pagan, wanighat cottrell, angel williamson, gregg gold . So [facility] [phone].    ATENCIÓN: Si habla español, tiene a crowell disposición servicios gratuitos de asistencia lingüística. Llame al [phone].    We comply with applicable federal civil rights laws and [place] laws. We do not discriminate on the basis of race, color, national origin, age, disability, sex, sexual orientation, or gender identity.               Review of your medicines      START taking        Dose / Directions    amoxicillin-clavulanate 875-125 MG per tablet   Commonly known as:  AUGMENTIN        Dose:  1 tablet   Take 1 tablet by mouth 2 times daily   Quantity:  20 tablet   Refills:  0       senna-docusate 8.6-50 MG per tablet   Commonly known as:  SENOKOT-S;PERICOLACE   Used for:  Slow transit constipation        Dose:  1 tablet   Take 1 tablet by mouth 2 times daily as needed for constipation   Quantity:  100 tablet   Refills:  0         CONTINUE these medicines which may have CHANGED, or have new prescriptions. If we are uncertain of the size of tablets/capsules you have at home, strength may be listed as something that might have changed.        Dose / Directions    atorvastatin 10 MG tablet   Commonly known as:  LIPITOR   This may have changed:  Another medication with the same name was  removed. Continue taking this medication, and follow the directions you see here.   Used for:  Mixed hyperlipidemia        Dose:  10 mg   Start taking on:  8/21/2018   Take 1 tablet (10 mg) by mouth daily   Refills:  0       furosemide 40 MG tablet   Commonly known as:  LASIX   This may have changed:  These instructions start on 8/22/2018. If you are unsure what to do until then, ask your doctor or other care provider.   Used for:  Hypervolemia, unspecified hypervolemia type        Dose:  40 mg   Start taking on:  8/22/2018   Take 1 tablet (40 mg) by mouth 2 times daily   Quantity:  30 tablet   Refills:  0         CONTINUE these medicines which have NOT CHANGED        Dose / Directions    acetaminophen 325 MG tablet   Commonly known as:  TYLENOL        Dose:  650 mg   Take 650 mg by mouth 3 times daily as needed for mild pain   Refills:  0       amLODIPine 10 MG tablet   Commonly known as:  NORVASC   Used for:  Benign essential hypertension        Dose:  10 mg   Take 1 tablet (10 mg) by mouth daily   Quantity:  30 tablet   Refills:  0       aspirin 325 MG EC tablet   Used for:  Paroxysmal atrial fibrillation (H)        Dose:  325 mg   Take 1 tablet (325 mg) by mouth daily   Quantity:  40 tablet   Refills:  0       citalopram 10 MG tablet   Commonly known as:  celeXA        Dose:  10 mg   Take 10 mg by mouth daily   Refills:  0       guanFACINE 2 MG tablet   Commonly known as:  TENEX   Used for:  Benign essential hypertension        Dose:  2 mg   Take 1 tablet (2 mg) by mouth daily   Refills:  0       hydrALAZINE 10 MG tablet   Commonly known as:  APRESOLINE   Used for:  Benign essential hypertension        Dose:  10 mg   Take 1 tablet (10 mg) by mouth 3 times daily   Quantity:  60 tablet   Refills:  0       hydrochlorothiazide 12.5 MG capsule   Commonly known as:  MICROZIDE   Used for:  Benign essential hypertension        Dose:  12.5 mg   Take 1 capsule (12.5 mg) by mouth daily   Quantity:  30 capsule   Refills:  0        insulin glargine 100 UNIT/ML injection   Commonly known as:  LANTUS   Used for:  Type 2 diabetes mellitus with diabetic nephropathy, with long-term current use of insulin (H)        Dose:  37 Units   Inject 37 Units Subcutaneous At Bedtime   Refills:  0       * NovoLOG FLEXPEN 100 UNIT/ML injection   Generic drug:  insulin aspart        Dose:  12 Units   Inject 12 Units Subcutaneous 2 times daily (with meals) Lunch and dinner. Hold if BG<150.   Refills:  0       * NovoLOG FLEXPEN 100 UNIT/ML injection   Generic drug:  insulin aspart        Dose:  9 Units   Inject 9 Units Subcutaneous daily (with breakfast) Hold if BG<150   Refills:  0       * nystatin cream   Commonly known as:  MYCOSTATIN        Apply topically 2 times daily Apply to groin, abdominal folds, and under both breasts.   Refills:  0       * nystatin cream   Commonly known as:  MYCOSTATIN        Apply topically 2 times daily as needed Apply to groin, abdominal folds, and under both breasts.   Refills:  0       * Notice:  This list has 4 medication(s) that are the same as other medications prescribed for you. Read the directions carefully, and ask your doctor or other care provider to review them with you.         Where to get your medicines      Some of these will need a paper prescription and others can be bought over the counter. Ask your nurse if you have questions.     You don't need a prescription for these medications     amoxicillin-clavulanate 875-125 MG per tablet    atorvastatin 10 MG tablet    furosemide 40 MG tablet    senna-docusate 8.6-50 MG per tablet                Protect others around you: Learn how to safely use, store and throw away your medicines at www.disposemymeds.org.        ANTIBIOTIC INSTRUCTION     You've Been Prescribed an Antibiotic - Now What?  Your healthcare team thinks that you or your loved one might have an infection. Some infections can be treated with antibiotics, which are powerful, life-saving drugs. Like all  medications, antibiotics have side effects and should only be used when necessary. There are some important things you should know about your antibiotic treatment.      Your healthcare team may run tests before you start taking an antibiotic.    Your team may take samples (e.g., from your blood, urine or other areas) to run tests to look for bacteria. These test can be important to determine if you need an antibiotic at all and, if you do, which antibiotic will work best.      Within a few days, your healthcare team might change or even stop your antibiotic.    Your team may start you on an antibiotic while they are working to find out what is making you sick.    Your team might change your antibiotic because test results show that a different antibiotic would be better to treat your infection.    In some cases, once your team has more information, they learn that you do not need an antibiotic at all. They may find out that you don't have an infection, or that the antibiotic you're taking won't work against your infection. For example, an infection caused by a virus can't be treated with antibiotics. Staying on an antibiotic when you don't need it is more likely to be harmful than helpful.      You may experience side effects from your antibiotic.    Like all medications, antibiotics have side effects. Some of these can be serious.    Let you healthcare team know if you have any known allergies when you are admitted to the hospital.    One significant side effect of nearly all antibiotics is the risk of severe and sometimes deadly diarrhea caused by Clostridium difficile (C. Difficile). This occurs when a person takes antibiotics because some good germs are destroyed. Antibiotic use allows C. diificile to take over, putting patients at high risk for this serious infection.    As a patient or caregiver, it is important to understand your or your loved one's antibiotic treatment. It is especially important for  caregivers to speak up when patients can't speak for themselves. Here are some important questions to ask your healthcare team.    What infection is this antibiotic treating and how do you know I have that infection?    What side effects might occur from this antibiotic?    How long will I need to take this antibiotic?    Is it safe to take this antibiotic with other medications or supplements (e.g., vitamins) that I am taking?     Are there any special directions I need to know about taking this antibiotic? For example, should I take it with food?    How will I be monitored to know whether my infection is responding to the antibiotic?    What tests may help to make sure the right antibiotic is prescribed for me?      Information provided by:  www.cdc.gov/getsmart  U.S. Department of Health and Human Services  Centers for disease Control and Prevention  National Center for Emerging and Zoonotic Infectious Diseases  Division of Healthcare Quality Promotion             Medication List: This is a list of all your medications and when to take them. Check marks below indicate your daily home schedule. Keep this list as a reference.      Medications           Morning Afternoon Evening Bedtime As Needed    acetaminophen 325 MG tablet   Commonly known as:  TYLENOL   Take 650 mg by mouth 3 times daily as needed for mild pain   Last time this was given:  650 mg on 8/20/2018 11:33 AM                                amLODIPine 10 MG tablet   Commonly known as:  NORVASC   Take 1 tablet (10 mg) by mouth daily   Last time this was given:  10 mg on 8/20/2018  8:26 AM                                amoxicillin-clavulanate 875-125 MG per tablet   Commonly known as:  AUGMENTIN   Take 1 tablet by mouth 2 times daily                                aspirin 325 MG EC tablet   Take 1 tablet (325 mg) by mouth daily   Last time this was given:  325 mg on 8/20/2018  8:30 AM                                atorvastatin 10 MG tablet   Commonly  known as:  LIPITOR   Take 1 tablet (10 mg) by mouth daily   Start taking on:  8/21/2018   Last time this was given:  10 mg on 8/20/2018  8:25 AM                                citalopram 10 MG tablet   Commonly known as:  celeXA   Take 10 mg by mouth daily   Last time this was given:  10 mg on 8/20/2018  8:26 AM                                furosemide 40 MG tablet   Commonly known as:  LASIX   Take 1 tablet (40 mg) by mouth 2 times daily   Start taking on:  8/22/2018                                guanFACINE 2 MG tablet   Commonly known as:  TENEX   Take 1 tablet (2 mg) by mouth daily   Last time this was given:  2 mg on 8/20/2018  8:26 AM                                hydrALAZINE 10 MG tablet   Commonly known as:  APRESOLINE   Take 1 tablet (10 mg) by mouth 3 times daily   Last time this was given:  10 mg on 8/20/2018  3:38 PM                                hydrochlorothiazide 12.5 MG capsule   Commonly known as:  MICROZIDE   Take 1 capsule (12.5 mg) by mouth daily   Last time this was given:  12.5 mg on 8/17/2018  9:00 AM                                insulin glargine 100 UNIT/ML injection   Commonly known as:  LANTUS   Inject 37 Units Subcutaneous At Bedtime   Last time this was given:  30 Units on 8/19/2018  9:23 PM                                * NovoLOG FLEXPEN 100 UNIT/ML injection   Inject 12 Units Subcutaneous 2 times daily (with meals) Lunch and dinner. Hold if BG<150.   Last time this was given:  5 Units on 8/20/2018 12:03 PM   Generic drug:  insulin aspart                                * NovoLOG FLEXPEN 100 UNIT/ML injection   Inject 9 Units Subcutaneous daily (with breakfast) Hold if BG<150   Last time this was given:  5 Units on 8/20/2018 12:03 PM   Generic drug:  insulin aspart                                * nystatin cream   Commonly known as:  MYCOSTATIN   Apply topically 2 times daily Apply to groin, abdominal folds, and under both breasts.   Last time this was given:  8/20/2018  8:37 AM                                 * nystatin cream   Commonly known as:  MYCOSTATIN   Apply topically 2 times daily as needed Apply to groin, abdominal folds, and under both breasts.   Last time this was given:  8/20/2018  8:37 AM                                senna-docusate 8.6-50 MG per tablet   Commonly known as:  SENOKOT-S;PERICOLACE   Take 1 tablet by mouth 2 times daily as needed for constipation   Last time this was given:  1 tablet on 8/19/2018  7:47 AM                                * Notice:  This list has 4 medication(s) that are the same as other medications prescribed for you. Read the directions carefully, and ask your doctor or other care provider to review them with you.

## 2018-08-16 ENCOUNTER — APPOINTMENT (OUTPATIENT)
Dept: ULTRASOUND IMAGING | Facility: CLINIC | Age: 73
DRG: 871 | End: 2018-08-16
Attending: PHYSICIAN ASSISTANT
Payer: MEDICARE

## 2018-08-16 ENCOUNTER — APPOINTMENT (OUTPATIENT)
Dept: PHYSICAL THERAPY | Facility: CLINIC | Age: 73
DRG: 871 | End: 2018-08-16
Attending: INTERNAL MEDICINE
Payer: MEDICARE

## 2018-08-16 ENCOUNTER — APPOINTMENT (OUTPATIENT)
Dept: GENERAL RADIOLOGY | Facility: CLINIC | Age: 73
DRG: 871 | End: 2018-08-16
Attending: PHYSICIAN ASSISTANT
Payer: MEDICARE

## 2018-08-16 PROBLEM — L03.90 CELLULITIS: Status: ACTIVE | Noted: 2018-08-16

## 2018-08-16 LAB
ALBUMIN UR-MCNC: >600 MG/DL
AMPHETAMINES UR QL SCN: NEGATIVE
APPEARANCE UR: ABNORMAL
BARBITURATES UR QL: NEGATIVE
BENZODIAZ UR QL: NEGATIVE
BILIRUB UR QL STRIP: NEGATIVE
CANNABINOIDS UR QL SCN: NEGATIVE
COCAINE UR QL: NEGATIVE
COLOR UR AUTO: YELLOW
GLUCOSE BLDC GLUCOMTR-MCNC: 124 MG/DL (ref 70–99)
GLUCOSE BLDC GLUCOMTR-MCNC: 130 MG/DL (ref 70–99)
GLUCOSE BLDC GLUCOMTR-MCNC: 130 MG/DL (ref 70–99)
GLUCOSE BLDC GLUCOMTR-MCNC: 147 MG/DL (ref 70–99)
GLUCOSE BLDC GLUCOMTR-MCNC: 160 MG/DL (ref 70–99)
GLUCOSE UR STRIP-MCNC: 300 MG/DL
HBA1C MFR BLD: 6.9 % (ref 0–5.6)
HGB UR QL STRIP: ABNORMAL
HYALINE CASTS #/AREA URNS LPF: 3 /LPF (ref 0–2)
KETONES UR STRIP-MCNC: NEGATIVE MG/DL
LEUKOCYTE ESTERASE UR QL STRIP: NEGATIVE
MUCOUS THREADS #/AREA URNS LPF: PRESENT /LPF
NITRATE UR QL: NEGATIVE
OPIATES UR QL SCN: NEGATIVE
PCP UR QL SCN: NEGATIVE
PH UR STRIP: 6.5 PH (ref 5–7)
PLATELET # BLD AUTO: 287 10E9/L (ref 150–450)
RBC #/AREA URNS AUTO: 6 /HPF (ref 0–2)
SOURCE: ABNORMAL
SP GR UR STRIP: 1.02 (ref 1–1.03)
SQUAMOUS #/AREA URNS AUTO: 2 /HPF (ref 0–1)
UROBILINOGEN UR STRIP-MCNC: NORMAL MG/DL (ref 0–2)
WBC #/AREA URNS AUTO: 2 /HPF (ref 0–5)

## 2018-08-16 PROCEDURE — 85049 AUTOMATED PLATELET COUNT: CPT | Performed by: INTERNAL MEDICINE

## 2018-08-16 PROCEDURE — A9270 NON-COVERED ITEM OR SERVICE: HCPCS | Mod: GY | Performed by: PHYSICIAN ASSISTANT

## 2018-08-16 PROCEDURE — 36415 COLL VENOUS BLD VENIPUNCTURE: CPT | Performed by: INTERNAL MEDICINE

## 2018-08-16 PROCEDURE — 00000146 ZZHCL STATISTIC GLUCOSE BY METER IP

## 2018-08-16 PROCEDURE — 25000131 ZZH RX MED GY IP 250 OP 636 PS 637: Mod: GY | Performed by: INTERNAL MEDICINE

## 2018-08-16 PROCEDURE — 25000132 ZZH RX MED GY IP 250 OP 250 PS 637: Mod: GY | Performed by: INTERNAL MEDICINE

## 2018-08-16 PROCEDURE — 97162 PT EVAL MOD COMPLEX 30 MIN: CPT | Mod: GP

## 2018-08-16 PROCEDURE — 83036 HEMOGLOBIN GLYCOSYLATED A1C: CPT | Performed by: INTERNAL MEDICINE

## 2018-08-16 PROCEDURE — A9270 NON-COVERED ITEM OR SERVICE: HCPCS | Mod: GY | Performed by: INTERNAL MEDICINE

## 2018-08-16 PROCEDURE — 96365 THER/PROPH/DIAG IV INF INIT: CPT

## 2018-08-16 PROCEDURE — 40000193 ZZH STATISTIC PT WARD VISIT

## 2018-08-16 PROCEDURE — 81001 URINALYSIS AUTO W/SCOPE: CPT | Performed by: PHYSICIAN ASSISTANT

## 2018-08-16 PROCEDURE — 25000132 ZZH RX MED GY IP 250 OP 250 PS 637: Mod: GY | Performed by: PHYSICIAN ASSISTANT

## 2018-08-16 PROCEDURE — 99207 ZZC CDG-HISTORY COMP: MEETS EXP. PROBLEM FOCUSED-DOWN CODED LACK OF ROS: CPT | Performed by: INTERNAL MEDICINE

## 2018-08-16 PROCEDURE — 99221 1ST HOSP IP/OBS SF/LOW 40: CPT | Mod: AI | Performed by: INTERNAL MEDICINE

## 2018-08-16 PROCEDURE — 12000000 ZZH R&B MED SURG/OB

## 2018-08-16 PROCEDURE — 25000128 H RX IP 250 OP 636: Performed by: INTERNAL MEDICINE

## 2018-08-16 PROCEDURE — 99207 ZZC NON-BILLABLE SERV PER CHARTING: CPT | Performed by: PHYSICIAN ASSISTANT

## 2018-08-16 PROCEDURE — 25000128 H RX IP 250 OP 636: Performed by: EMERGENCY MEDICINE

## 2018-08-16 PROCEDURE — 71046 X-RAY EXAM CHEST 2 VIEWS: CPT

## 2018-08-16 PROCEDURE — 97530 THERAPEUTIC ACTIVITIES: CPT | Mod: GP

## 2018-08-16 PROCEDURE — 87040 BLOOD CULTURE FOR BACTERIA: CPT | Performed by: EMERGENCY MEDICINE

## 2018-08-16 PROCEDURE — G0463 HOSPITAL OUTPT CLINIC VISIT: HCPCS

## 2018-08-16 PROCEDURE — 93970 EXTREMITY STUDY: CPT

## 2018-08-16 RX ORDER — NYSTATIN 100000 U/G
CREAM TOPICAL 2 TIMES DAILY PRN
COMMUNITY
End: 2018-10-11

## 2018-08-16 RX ORDER — AMLODIPINE BESYLATE 10 MG/1
10 TABLET ORAL DAILY
Status: DISCONTINUED | OUTPATIENT
Start: 2018-08-16 | End: 2018-08-20 | Stop reason: HOSPADM

## 2018-08-16 RX ORDER — GUANFACINE 1 MG/1
2 TABLET ORAL DAILY
Status: DISCONTINUED | OUTPATIENT
Start: 2018-08-16 | End: 2018-08-20 | Stop reason: HOSPADM

## 2018-08-16 RX ORDER — NALOXONE HYDROCHLORIDE 0.4 MG/ML
.1-.4 INJECTION, SOLUTION INTRAMUSCULAR; INTRAVENOUS; SUBCUTANEOUS
Status: DISCONTINUED | OUTPATIENT
Start: 2018-08-16 | End: 2018-08-20 | Stop reason: HOSPADM

## 2018-08-16 RX ORDER — CITALOPRAM HYDROBROMIDE 10 MG/1
10 TABLET ORAL DAILY
COMMUNITY

## 2018-08-16 RX ORDER — AMOXICILLIN 250 MG
1 CAPSULE ORAL 2 TIMES DAILY PRN
Status: DISCONTINUED | OUTPATIENT
Start: 2018-08-16 | End: 2018-08-20 | Stop reason: HOSPADM

## 2018-08-16 RX ORDER — AMOXICILLIN 250 MG
2 CAPSULE ORAL 2 TIMES DAILY PRN
Status: DISCONTINUED | OUTPATIENT
Start: 2018-08-16 | End: 2018-08-20 | Stop reason: HOSPADM

## 2018-08-16 RX ORDER — AMOXICILLIN 250 MG
1 CAPSULE ORAL 2 TIMES DAILY
Status: DISCONTINUED | OUTPATIENT
Start: 2018-08-16 | End: 2018-08-20 | Stop reason: HOSPADM

## 2018-08-16 RX ORDER — ACETAMINOPHEN 325 MG/1
650 TABLET ORAL EVERY 4 HOURS PRN
Status: DISCONTINUED | OUTPATIENT
Start: 2018-08-16 | End: 2018-08-20 | Stop reason: HOSPADM

## 2018-08-16 RX ORDER — ATORVASTATIN CALCIUM 10 MG/1
10 TABLET, FILM COATED ORAL DAILY
Status: DISCONTINUED | OUTPATIENT
Start: 2018-08-16 | End: 2018-08-20 | Stop reason: HOSPADM

## 2018-08-16 RX ORDER — POLYETHYLENE GLYCOL 3350 17 G/17G
17 POWDER, FOR SOLUTION ORAL DAILY PRN
Status: DISCONTINUED | OUTPATIENT
Start: 2018-08-16 | End: 2018-08-20 | Stop reason: HOSPADM

## 2018-08-16 RX ORDER — ONDANSETRON 2 MG/ML
4 INJECTION INTRAMUSCULAR; INTRAVENOUS EVERY 6 HOURS PRN
Status: DISCONTINUED | OUTPATIENT
Start: 2018-08-16 | End: 2018-08-20 | Stop reason: HOSPADM

## 2018-08-16 RX ORDER — PIPERACILLIN SODIUM, TAZOBACTAM SODIUM 3; .375 G/15ML; G/15ML
3.38 INJECTION, POWDER, LYOPHILIZED, FOR SOLUTION INTRAVENOUS EVERY 6 HOURS
Status: DISCONTINUED | OUTPATIENT
Start: 2018-08-16 | End: 2018-08-19 | Stop reason: DRUGHIGH

## 2018-08-16 RX ORDER — HYDROCHLOROTHIAZIDE 12.5 MG/1
12.5 CAPSULE ORAL DAILY
Status: DISCONTINUED | OUTPATIENT
Start: 2018-08-16 | End: 2018-08-17

## 2018-08-16 RX ORDER — NICOTINE POLACRILEX 4 MG
15-30 LOZENGE BUCCAL
Status: DISCONTINUED | OUTPATIENT
Start: 2018-08-16 | End: 2018-08-20 | Stop reason: HOSPADM

## 2018-08-16 RX ORDER — FUROSEMIDE 40 MG
40 TABLET ORAL 2 TIMES DAILY
Status: DISCONTINUED | OUTPATIENT
Start: 2018-08-17 | End: 2018-08-17

## 2018-08-16 RX ORDER — AMOXICILLIN 250 MG
2 CAPSULE ORAL 2 TIMES DAILY
Status: DISCONTINUED | OUTPATIENT
Start: 2018-08-16 | End: 2018-08-20 | Stop reason: HOSPADM

## 2018-08-16 RX ORDER — FUROSEMIDE 40 MG
40 TABLET ORAL 2 TIMES DAILY
Status: ON HOLD | COMMUNITY
End: 2018-08-20

## 2018-08-16 RX ORDER — ATORVASTATIN CALCIUM 10 MG/1
5 TABLET, FILM COATED ORAL DAILY
Status: ON HOLD | COMMUNITY
End: 2018-08-20

## 2018-08-16 RX ORDER — CITALOPRAM HYDROBROMIDE 10 MG/1
10 TABLET ORAL DAILY
Status: DISCONTINUED | OUTPATIENT
Start: 2018-08-16 | End: 2018-08-20 | Stop reason: HOSPADM

## 2018-08-16 RX ORDER — NYSTATIN 100000 U/G
CREAM TOPICAL 2 TIMES DAILY
COMMUNITY

## 2018-08-16 RX ORDER — ALBUTEROL SULFATE 0.83 MG/ML
3 SOLUTION RESPIRATORY (INHALATION)
Status: DISCONTINUED | OUTPATIENT
Start: 2018-08-16 | End: 2018-08-20 | Stop reason: HOSPADM

## 2018-08-16 RX ORDER — ONDANSETRON 4 MG/1
4 TABLET, ORALLY DISINTEGRATING ORAL EVERY 6 HOURS PRN
Status: DISCONTINUED | OUTPATIENT
Start: 2018-08-16 | End: 2018-08-20 | Stop reason: HOSPADM

## 2018-08-16 RX ORDER — ACETAMINOPHEN 325 MG/1
650 TABLET ORAL 3 TIMES DAILY PRN
Status: DISCONTINUED | OUTPATIENT
Start: 2018-08-16 | End: 2018-08-20

## 2018-08-16 RX ORDER — HEPARIN SODIUM 5000 [USP'U]/.5ML
5000 INJECTION, SOLUTION INTRAVENOUS; SUBCUTANEOUS EVERY 12 HOURS
Status: DISCONTINUED | OUTPATIENT
Start: 2018-08-16 | End: 2018-08-20 | Stop reason: HOSPADM

## 2018-08-16 RX ORDER — NYSTATIN 100000 U/G
CREAM TOPICAL 2 TIMES DAILY
Status: DISCONTINUED | OUTPATIENT
Start: 2018-08-16 | End: 2018-08-20 | Stop reason: HOSPADM

## 2018-08-16 RX ORDER — HYDRALAZINE HYDROCHLORIDE 10 MG/1
10 TABLET, FILM COATED ORAL 3 TIMES DAILY
Status: DISCONTINUED | OUTPATIENT
Start: 2018-08-16 | End: 2018-08-20 | Stop reason: HOSPADM

## 2018-08-16 RX ORDER — BISACODYL 10 MG
10 SUPPOSITORY, RECTAL RECTAL DAILY PRN
Status: DISCONTINUED | OUTPATIENT
Start: 2018-08-16 | End: 2018-08-20 | Stop reason: HOSPADM

## 2018-08-16 RX ORDER — HYDRALAZINE HYDROCHLORIDE 20 MG/ML
10 INJECTION INTRAMUSCULAR; INTRAVENOUS EVERY 4 HOURS PRN
Status: DISCONTINUED | OUTPATIENT
Start: 2018-08-16 | End: 2018-08-20 | Stop reason: HOSPADM

## 2018-08-16 RX ADMIN — HEPARIN SODIUM 5000 UNITS: 5000 INJECTION, SOLUTION INTRAVENOUS; SUBCUTANEOUS at 16:04

## 2018-08-16 RX ADMIN — SODIUM CHLORIDE 1000 ML: 9 INJECTION, SOLUTION INTRAVENOUS at 01:10

## 2018-08-16 RX ADMIN — PIPERACILLIN SODIUM,TAZOBACTAM SODIUM 3.38 G: 3; .375 INJECTION, POWDER, FOR SOLUTION INTRAVENOUS at 16:03

## 2018-08-16 RX ADMIN — HEPARIN SODIUM 5000 UNITS: 5000 INJECTION, SOLUTION INTRAVENOUS; SUBCUTANEOUS at 04:13

## 2018-08-16 RX ADMIN — PIPERACILLIN SODIUM,TAZOBACTAM SODIUM 3.38 G: 3; .375 INJECTION, POWDER, FOR SOLUTION INTRAVENOUS at 10:51

## 2018-08-16 RX ADMIN — ASPIRIN 325 MG: 325 TABLET, DELAYED RELEASE ORAL at 08:44

## 2018-08-16 RX ADMIN — PIPERACILLIN SODIUM,TAZOBACTAM SODIUM 3.38 G: 3; .375 INJECTION, POWDER, FOR SOLUTION INTRAVENOUS at 21:59

## 2018-08-16 RX ADMIN — AMLODIPINE BESYLATE 10 MG: 10 TABLET ORAL at 08:45

## 2018-08-16 RX ADMIN — INSULIN GLARGINE 30 UNITS: 100 INJECTION, SOLUTION SUBCUTANEOUS at 04:28

## 2018-08-16 RX ADMIN — CITALOPRAM HYDROBROMIDE 10 MG: 10 TABLET ORAL at 12:17

## 2018-08-16 RX ADMIN — NYSTATIN: 100000 CREAM TOPICAL at 20:05

## 2018-08-16 RX ADMIN — GUANFACINE 2 MG: 1 TABLET ORAL at 10:50

## 2018-08-16 RX ADMIN — CEFTRIAXONE SODIUM 2 G: 2 INJECTION, POWDER, FOR SOLUTION INTRAMUSCULAR; INTRAVENOUS at 01:09

## 2018-08-16 RX ADMIN — INSULIN ASPART 5 UNITS: 100 INJECTION, SOLUTION INTRAVENOUS; SUBCUTANEOUS at 17:14

## 2018-08-16 RX ADMIN — PIPERACILLIN SODIUM,TAZOBACTAM SODIUM 3.38 G: 3; .375 INJECTION, POWDER, FOR SOLUTION INTRAVENOUS at 04:13

## 2018-08-16 RX ADMIN — HYDRALAZINE HYDROCHLORIDE 10 MG: 10 TABLET ORAL at 21:59

## 2018-08-16 RX ADMIN — SENNOSIDES AND DOCUSATE SODIUM 2 TABLET: 8.6; 5 TABLET ORAL at 20:05

## 2018-08-16 RX ADMIN — INSULIN GLARGINE 30 UNITS: 100 INJECTION, SOLUTION SUBCUTANEOUS at 21:59

## 2018-08-16 RX ADMIN — HYDRALAZINE HYDROCHLORIDE 10 MG: 10 TABLET ORAL at 16:04

## 2018-08-16 RX ADMIN — ACETAMINOPHEN 650 MG: 325 TABLET, FILM COATED ORAL at 08:44

## 2018-08-16 RX ADMIN — HYDROCHLOROTHIAZIDE 12.5 MG: 12.5 CAPSULE ORAL at 08:45

## 2018-08-16 RX ADMIN — SENNOSIDES AND DOCUSATE SODIUM 1 TABLET: 8.6; 5 TABLET ORAL at 08:44

## 2018-08-16 RX ADMIN — INSULIN ASPART 5 UNITS: 100 INJECTION, SOLUTION INTRAVENOUS; SUBCUTANEOUS at 12:17

## 2018-08-16 RX ADMIN — HYDRALAZINE HYDROCHLORIDE 10 MG: 10 TABLET ORAL at 08:44

## 2018-08-16 RX ADMIN — ATORVASTATIN CALCIUM 10 MG: 10 TABLET, FILM COATED ORAL at 08:44

## 2018-08-16 ASSESSMENT — ACTIVITIES OF DAILY LIVING (ADL)
ADLS_ACUITY_SCORE: 20
ADLS_ACUITY_SCORE: 18
ADLS_ACUITY_SCORE: 18
WHICH_OF_THE_ABOVE_FUNCTIONAL_RISKS_HAD_A_RECENT_ONSET_OR_CHANGE?: AMBULATION;TRANSFERRING;FALL HISTORY
ADLS_ACUITY_SCORE: 18
ADLS_ACUITY_SCORE: 20

## 2018-08-16 ASSESSMENT — ENCOUNTER SYMPTOMS
CHILLS: 1
FEVER: 1
WOUND: 1

## 2018-08-16 NOTE — PROGRESS NOTES
[facility]    Hospitalist Progress Note    Date of Service (when I saw the patient): 08/16/2018    Assessment & Plan   Amber Hull is a 73-year-old diabetic with hypertension and chronic leg wounds, admitted with fever, right lower extremity pain with erythema and ulcers, suspicious for cellulitis. Became hypoxic on HD #1 and found to have RLL pneumonia. Admitted for further treatment.       Sepsis due to right lower extremity cellulitis and possibly pneumonia.    - Wound care nurse consultation.    - Continue the patient on her diuretics.   - Received IV ceftriaxone in the ED. Changed to IV Zosyn.    - No documented history of MRSA.    - Blood culture obtained.  - Attempt to manage pain with Tylenol.  She is currently comfortable.  - Pneumonia treatment as below  - Monitor fever curve.  CBC in AM.    RLL pneumonia with acute hypoxic respiratory failure: Patient currently requiring 2-3 L of oxygen to maintain O2 saturation above 90%.  She was not reported to be hypoxic in the emergency department.  She denies any respiratory symptoms such as cough or shortness of breath.  Temperature max of 101.2 with mild tachycardia.  - Continue treatment with IV Zosyn which will cover for cellulitis as well as potentially aspiration pneumonia.   - Patient does reside in a nursing home.  - Nebulizer as needed  - Monitor continuous pulse oximetry    Hypertension. Blood pressures have been quite elevated.  - Continue the patient on amlodipine, hydralazine, and hydrochlorothiazide.  - Hydralazine IV as needed for SBP >180  - Adjust oral medications as necessary    Hyperlipidemia.  Continue Lipitor.     Depression.  Continue Celexa.     Insulin dependent diabetes.    - On Lantus 37 units at home.  We will start with 30 units at bedtime.    - Sliding scale insulin.    - The patient's mealtime NovoLog will be decreased from the usual outpatient doses to start out with.     # Pain Assessment:  Current Pain Score  8/16/2018   Patient currently in pain? denies   Pain score (0-10) -   Pain location -   Pain descriptors -   - Amber is experiencing pain due to right leg cellulitis. Pain management was discussed and the plan was created in a collaborative fashion.  Amber's response to the current recommendations: compliant  - Please see the plan for pain management as documented above    DVT prophylaxis: Subcutaneous heparin.     CODE STATUS:  FULL.     Disposition: Expected discharge >2 days.    Shaheen Carmen    Interval History   Patient reports that she feels fine this morning.  She is resting comfortably on my arrival.  She does have a fever but denies any subjective febrile symptoms.  States that her right leg is not currently bothering her but it looked like she was having some pain earlier.  Denies any cough or breathing difficulty.  Patient seems to minimize her symptoms.    -Data reviewed today: I reviewed all new labs and imaging results over the last 24 hours.    Physical Exam   Temp: 101.2  F (38.4  C) Temp src: Oral BP: 156/71   Heart Rate: 104 Resp: 16 SpO2: 91 % O2 Device: Nasal cannula Oxygen Delivery: 3 LPM  Vitals:    08/15/18 2111 08/16/18 0321   Weight: 90.7 kg (200 lb) 115.8 kg (255 lb 6.4 oz)     Vital Signs with Ranges  Temp:  [98.7  F (37.1  C)-101.2  F (38.4  C)] 101.2  F (38.4  C)  Heart Rate:  [] 104  Resp:  [16-18] 16  BP: (156-215)/() 156/71  SpO2:  [83 %-96 %] 91 %       Constitutional: Alert, oriented to person, place, situation.  Cooperative, lying in bed in NAD.  Respiratory:  Lungs CTAB.  No crackles, wheezes, or rhonchi, no labored breathing.  Cardiovascular:  Heart RRR, no MRG, no edema.  GI:  Abdomen soft, NT/ND and with normoactive BS  Skin/Integumen:  Warm, dry, non-diaphoretic.  Right leg erythema to calf.  MSK: Bilateral lower extremity edema.  There are ulcerations over the medial and lateral aspect of the right calf.  There is tenderness and erythema of her right  lower extremity.  No wounds seen on left lower extremity.    Medications       amLODIPine  10 mg Oral Daily     aspirin  325 mg Oral Daily     atorvastatin (LIPITOR) tablet 10 mg  10 mg Oral Daily     guanFACINE  2 mg Oral Daily     heparin  5,000 Units Subcutaneous Q12H     hydrALAZINE  10 mg Oral TID     hydrochlorothiazide  12.5 mg Oral Daily     insulin aspart  5 Units Subcutaneous TID w/meals     insulin aspart  1-7 Units Subcutaneous TID AC     insulin aspart  1-5 Units Subcutaneous At Bedtime     insulin glargine  30 Units Subcutaneous At Bedtime     piperacillin-tazobactam  3.375 g Intravenous Q6H     senna-docusate  1 tablet Oral BID    Or     senna-docusate  2 tablet Oral BID       Data     Recent Labs  Lab 08/16/18  0730 08/15/18  2200   WBC  --  16.2*   HGB  --  10.0*   MCV  --  87    288   NA  --  138   POTASSIUM  --  5.0   CHLORIDE  --  108   CO2  --  20   BUN  --  29   CR  --  1.40*   ANIONGAP  --  10   IDANIA  --  8.7   GLC  --  120*   ALBUMIN  --  2.3*   PROTTOTAL  --  7.1   BILITOTAL  --  0.4   ALKPHOS  --  215*   ALT  --  20   AST  --  23       No results found for this or any previous visit (from the past 24 hour(s)).

## 2018-08-16 NOTE — PROGRESS NOTES
[facility] Nurse Inpatient Wound Assessment     Initial Assessment of wound(s) on pt's:   RLE        Data:   Patient History:      per MD note(s): Amber Hull is a 73-year-old diabetic with hypertension and chronic leg wounds, admitted with fever, right lower extremity pain with erythema and ulcers, suspicious for cellulitis.       Gregorio Assessment and sub scores:   Gregorio Score  Av  Min: 17  Max: 17    Positioning: Pillows    Mattress:  Standard , Atmos Air mattress       Moisture Management:  Incontinence Protocol prn, assist to toilet    Catheter secured? Not applicable      Current Diet / Nutrition:           Active Diet Order      Moderate Consistent CHO Diet    Labs:   Recent Labs   Lab Test  18   0730  08/15/18   2200   10/13/17   0725   ALBUMIN   --   2.3*   --    --    HGB   --   10.0*   < >  13.4   INR   --    --    --   1.04   WBC   --   16.2*   < >   --    A1C  6.9*   --    --   12.8*    < > = values in this interval not displayed.       Wound Assessment (location):   RLE  Wound History:  Unclear history; pt thinks may have started as blister a couple of weeks ago.  Denies trauma.  States does not wear compression.  Reports old history of wounds to lower legs of unclear reason; lower aspect of LLE appears indented and scarred.  Pt up in chair at assessment and would not allow leg rest under legs and would not get in bed, preferred to have legs down due to pain/tenderness in RLE.  Pt rather sleepy and dismissive and was difficult to have clear conversation with her.      Wound Base: dark red-yellow dryish tissue.  Scattered slightly weepy/blistery pink tissue to anterior shin.      Specific Dimensions (length x width x depth, in cm) :   Main wound approx 1.5 x 3.5 x 0.1+cm.      Periwound Skin: pink, slightly raw in areas, legs edematous, scaly, dry    Color: pink    Temperature  normal to warm    Drainage:  Small weepy     Odor: none    Pain:  Moderate, very tender  with any palpation           Intervention:     Patient's chart evaluated.      Wound(s) assessed    Wound Care: cleansed and moisturized BLE, wound cleansed with MicroKlenz, covered with Iodosorb gel and Mepilex    Orders  Written    Supplies  reviewed and gathered    Discussed plan of care with Patient and Nurse          Assessment:          RLE with likely cellulitis and small wounds of unclear etiology, possibly r/t edema and blistering.  Would likely benefit from compression if not contraindicated.  However it is unclear if pt would be compliant, and she is unlikely to be able to care for her legs herself.          Plan:     Nursing to notify the Provider(s) and re-consult the [facility] Nurse if wound(s) deteriorate(s) or if the wound care plan needs reevaluation.      Plan of care for wound(s) located on RLE: Daily:  1.  Cleanse intact skin on lower legs with mild cleanser, follow with Sween 24 cream.  2.  Cleanse wounds with MicroKlenz.  3.  Swab periwound with skin prep, let dry.  4.  Apply moderate linear glob of Iodosorb gel to Mepilex 4x4, and apply to main wound on medial RLE.    5.  If other areas become raw/weepy (ie anterior RLE), cover with Adaptic and ABD or gauze and secure with Kerlix.    6.  Elevate legs as much as possible, floating heels at all times.   7.  Consider Lymphedema consult.     [facility] Nurse will return: weekly and prn

## 2018-08-16 NOTE — ED NOTES
[facility]  ED Nurse Handoff Report    ED Chief complaint: Wound Check (open wound on right leg, nursing home reported fever today)      ED Diagnosis:   Final diagnoses:   Cellulitis of lower extremity, unspecified laterality       Code Status: Full Code    Allergies:   Allergies   Allergen Reactions     No Known Allergies        Activity level - Baseline/Home:  Independent    Activity Level - Current:   Stand with Assist     Needed?: No    Isolation: No  Infection: Not Applicable  Bariatric?: No    Vital Signs:   Vitals:    08/15/18 2113 08/15/18 2200 08/16/18 0000 08/16/18 0030   BP: (!) 186/95 176/87 (!) 204/103 (!) 196/103   Resp:       Temp:       TempSrc:       SpO2:  94% 96% 96%   Weight:       Height:           Cardiac Rhythm: ,        Pain level: 0-10 Pain Scale: 9    Is this patient confused?: No   [place] - Suicide Severity Rating Scale Completed?  Yes  If yes, what color did the patient score?  White    Patient Report: Initial Complaint:open wound on right lower leg  Focused Assessment: type 2 diabetic with open wound on right lower leg. Pt is able to reposition independently in bed, and walk to bedside commode with assistant.  Tests Performed: labs  Abnormal Results:   Labs Ordered and Resulted from Time of ED Arrival Up to the Time of Departure from the ED   CBC WITH PLATELETS DIFFERENTIAL - Abnormal; Notable for the following:        Result Value    WBC 16.2 (*)     RBC Count 3.57 (*)     Hemoglobin 10.0 (*)     Hematocrit 31.0 (*)     RDW 15.7 (*)     Absolute Neutrophil 13.3 (*)     All other components within normal limits   COMPREHENSIVE METABOLIC PANEL - Abnormal; Notable for the following:     Glucose 120 (*)     Creatinine 1.40 (*)     GFR Estimate 37 (*)     GFR Estimate If Black 45 (*)     Albumin 2.3 (*)     Alkaline Phosphatase 215 (*)     All other components within normal limits   ROUTINE UA WITH MICROSCOPIC - Abnormal; Notable for the following:     Glucose  Urine 70 (*)     Blood Urine Trace (*)     Protein Albumin Urine 300 (*)     Mucous Urine Present (*)     All other components within normal limits   LACTIC ACID WHOLE BLOOD   BLOOD CULTURE   BLOOD CULTURE     Treatments provided: IV fluids and Rocephen    Family Comments: None present    OBS brochure/video discussed/provided to patient: No    ED Medications:   Medications   cefTRIAXone (ROCEPHIN) 2 g vial to attach to  ml bag for ADULTS or NS 50 ml bag for PEDS (2 g Intravenous New Bag 8/16/18 0109)   0.9% sodium chloride BOLUS (1,000 mLs Intravenous New Bag 8/16/18 0110)   0.9% sodium chloride BOLUS (500 mLs Intravenous New Bag 8/15/18 2206)       Drips infusing?:  No    For the majority of the shift this patient was Green.   Interventions performed were N/A    Severe Sepsis OR Septic Shock Diagnosis Present: No      ED NURSE PHONE NUMBER: [phone]

## 2018-08-16 NOTE — ED PROVIDER NOTES
"  History     Chief Complaint:  Wound check     HPI:   The history is provided by the patient and the nursing home.      Amber Hull is a 73 year old female with a history of type II diabetes mellitus, septicemia, and IKE who presents to the emergency department for evaluation of a wound. The patient reports here from her nursing home who state she has a fever and a right open lower extremity wound. Here, she endorses that she feels well though does have some right leg pain and tenderness.     Allergies:  No Known Allergies      Medications:    Amlodipine   Atorvastatin   Aspirin 325 mg   Tenex   Apersoline   Hydrochlorothiazide   Novolog pen   Lantus   Miconazole   Miralax     Past Medical History:    Type II diabetes mellitus   Septicemia due to group B strep   Endometrial hyperplasia   Frequent menstruation   Ganglion   Ovarian cyst   Ureteral stone   IKE   SIRS   Obesity   Hydronephrosis     Past Surgical History:    Total abdomen hysterectomy   Colonoscopy    Correct bunion simple   Dilation and curettage   Excise ganglion wrist left   Excise recurrent ganglion wrist x 2   Flex sigmoidoscopy   Cholecystectomy   Revise median carpal tunnel right and left   Teeth extracted   Laser holmium lithotripsy ureters insert stent left     Family History:    Maternal grandfather  - MI     Social History:  Presents via EMS    Tobacco use: Never smoker   Alcohol use: No   PCP: [facility]    Marital Status:        Review of Systems   Constitutional: Positive for chills and fever.   Skin: Positive for rash and wound.   All other systems reviewed and are negative.      Physical Exam     Patient Vitals for the past 24 hrs:   BP Temp Temp src Heart Rate Resp SpO2 Height Weight   08/16/18 0000 (!) 204/103 - - - - 96 % - -   08/15/18 2200 176/87 - - - - 94 % - -   08/15/18 2113 (!) 186/95 - - - - - - -   08/15/18 2111 - 98.7  F (37.1  C) Oral 95 18 94 % 1.6 m (5' 3\") 90.7 kg (200 lb)        Physical " Exam  Vitals: reviewed by me  [place]: [place], cooperative, and alert to conversation  Eyes: Tracking well, clear conjunctiva BL  ENT: MMM, midline trachea.   Lungs:  No tachypnea, no accessory muscle use. No respiratory distress.   CV: Rate as above, regular rhythm.    Abd: Soft, non tender, no guarding, no rebound. Non distended  MSK: no peripheral edema or joint effusion.  No evidence of trauma  Skin:   Bilateral lower extremities with 1-2+ pitting edema and some chronic wounds noted.  Right extremity appears to be more affected, some of the wounds appear to have erythema tenderness and warmth surrounding the wounds, with open skin sores.  No fluctuance, no abscess is noted.  Erythema extends throughout the proximal and distal calf, no streaking, no bullae.  Neuro: Clear speech and no facial droop.  Psych: Not RIS, no e/o AH/VH      Emergency Department Course     Laboratory:  CBC: WBC 16.2 (H), HGB 10.0 (L), WNL ()   CMP: Creatinine 1.40 (H), Glucose 120 (H), GFR 37 (L), Albumin 2.3 (L), ALKPHOS 215 (H), ow WNL    Lactic acid whole blood: 0.7   UA with Microscopic: Glucose 70, Blood trace, Protein albumin 300, Mucous present, ow Negative   Blood Culture x2: Pending     Interventions:  2206:  ml IV Bolus    Rocephin 2 g in 100 IV infusion      Emergency Department Course:  Past medical records, nursing notes, and vitals reviewed.  I performed an exam of the patient and obtained history, as documented above.     IV inserted and blood drawn. This was sent to the lab for further testing, results above.   Above interventions provided.      2322: I rechecked the patient. Explained findings to patient.     The patient provided a urine sample here in the emergency department. This was sent for laboratory testing, findings above.     I personally reviewed the laboratory results with the Patient and answered all related questions prior to admission.       Findings and plan  explained to the Patient who consents to admission.     0026: Discussed the patient with Dr. Saldana, who will admit the patient to a medical bed for further monitoring, evaluation, and treatment.       Impression & Plan      Medical Decision Making:  Amber Hlul is a 73 year old female sent from nursing home with a fever, I feel the likely source of her fever is from right lower extremity cellulitis. Her chronic wounds do appear to be well healing, but there is some erythema, warmth, and tenderness around her calf. She also has an elevated white count here, and with a history of diabetes and her age, I do not think she is an ideal candidate for oral antibiotics to treat her cellulitis. Reassuringly, she has no other evidence of infection, and a normal lactate. I do think she needs IV antibiotics and I will admit to the care of Dr. Saldana, who very generously agreed to accept care of the patient to the floor. I anticipate she will need only a couple days of antibiotics and may be discharged on orals, but again, does not appear to be ideal candidate for outpatient right now.    Diagnosis:    ICD-10-CM    1. Cellulitis of lower extremity, unspecified laterality L03.119        Disposition:  Admitted to Dr. Saldana for further evaluation and care.      Scribe Disclosure:   I, Brendan Bajwa, am serving as a scribe PM on 8/15/2018 to document services personally performed by Maik Carrasco MD based on my observations and the provider's statements to me.       Maik Carrasco MD  8/15/2018    EMERGENCY DEPARTMENT       Aristides Carrasco MD  08/16/18 0039

## 2018-08-16 NOTE — PLAN OF CARE
Problem: Patient Care Overview  Goal: Plan of Care/Patient Progress Review  Discharge Planner PT   Patient plan for discharge: Prior living arrangement   Current status:   74 y/o pt admitted from SNF for fever, see medical record for details     PLOF: Mod I for gait and transfers. Assist for bathing with use of shower chair. Denies falls     Eval complete, treatment initiated. Pt sleeping in chair upon arrival. Oriented to person, required cues for time and place.   On supp O2. Pt placed on RA for activity. Sats 90%, cues for PLB throughout. Edu PT role and POC. Pt engaged in STS x 3 from recliner with FWW, CGA, heavy use of BUE push off. Pt engaged in standing marches 10 reps and standing tolerance, currently < 2 minutes proir to requiring rest break. Pt engaged in seated marches, LAQ, AP for improved bld flow and ROM. Pt instructed to perform seated exercises IND, verbalizded understanding.   Barriers to return to prior living situation: medical stability  Recommendations for discharge: Return to prior living arrangement with continued PT to improve transfers, gait, strength, endurance as pt below baseline at this time. Pt reports she previously was able to walk to cafeteria in her facility. Currently pt unable to tolerate > 5' and tolerates standing < 2 min  Rationale for recommendations: See above        Entered by: Blanche Jackson 08/16/2018 3:40 PM

## 2018-08-16 NOTE — H&P
Admitted:     08/15/2018      DATE OF ADMISSION: 08/16/2018      PRIMARY CARE PHYSICIAN: Gregg Teran DO      CHIEF COMPLAINT:  Fever, right leg pain.      HISTORY OF PRESENT ILLNESS:  Amber Hull is a 73-year-old female with type 2 diabetes on insulin with a history of hypertension, depression, and chronic kidney disease who presents to [facility] with fever.  The patient currently lives in a nursing home.  Today the patient was noted to have a fever and she has chronic right lower extremity wounds.  She has noticed increased redness and tenderness.  She came to [facility] for further assessment.      In the emergency department, the patient was seen by Dr. Maik Carrasco.  The patient had normal electrolytes, BUN of 29, creatinine 1.40 with calculated GFR 37.  Albumin is 2.3, total protein 7.1, alkaline phosphatase of 215, ALT and AST are normal.  Normal lactic acid level.  White count elevated at 16,200, hemoglobin 10, platelets 288 with a left shift.  Urinalysis has 70 of glucose, specific gravity 1.006, 300 of protein, 1 white cell, 1 red cell.  Blood cultures obtained.  The patient received IV ceftriaxone and is being admitted for cellulitis.      PAST MEDICAL HISTORY:   1.  Type 2 diabetes.    2.  Chronic kidney disease.    3.  History of group B strep sepsis.   4.  Hyperlipidemia.    5.  Hypertension.   6.  Hypothyroidism.      PAST SURGICAL HISTORY:   1.  Total abdominal hysterectomy.    2.  Colonoscopy.    3.  Bunion correction surgery.   4.  D and C.    5.  Ganglion excision from the left wrist x 2.  6.  Flexible sigmoidoscopy.    7.  Cholecystectomy.     8.  Bilateral carpal tunnel release.     9.  Teeth extraction.    10.  Laser holmium lithotripsy with ureteral stent placement.      FAMILY HISTORY:  Maternal grandfather with myocardial infarction.      SOCIAL HISTORY:  She is .  No tobacco, no alcohol.  She lives in a nursing home.      ALLERGIES:  NO  KNOWN DRUG ALLERGIES.      CURRENT MEDICATIONS:   1.  Lipitor 5 mg once a day.   2.  Amlodipine 10 mg once daily.   3.  Aspirin 325 mg once a day.    4.  Celexa 10 mg once a day.   5.  Hydralazine 10 mg 3 times a day.   6.  Hydrochlorothiazide 12.5 mg once a day.   7.  Lantus 37 units at bedtime.   8.  Lasix 40 mg b.i.d.   9.  NovoLog 9 units with breakfast, 12 units at lunch and 12 units with supper.    10.  Guanfacine 2 mg daily.      REVIEW OF SYSTEMS:  Ten-point systems reviewed and as detailed in history of present illness.      PHYSICAL EXAMINATION:   VITAL SIGNS:  Temperature 98.7, heart rate 95, respiration 18, blood pressure 215/182, sats 94%.   GENERAL:  The patient is a 73-year-old  female.  She is pleasant, cooperative, not confused, oriented x 3.   HEENT:  Exam is unremarkable.   NECK:  Veins are not distended.   LUNGS:  Clear to auscultation.   CARDIOVASCULAR:  S1, S2, regular rate and rhythm.   ABDOMEN:  Soft, obese, no focal tenderness.   EXTREMITIES:  She has bilateral lower extremity edema.  She has ulcers over the medial and lateral aspect of the right lower leg.  There are areas of tenderness and erythema of her right lower extremity.   NEUROLOGIC:  She has decreased sensation of her feet.  Cranial nerves are grossly intact.  Upper extremity exam is unremarkable.      LABORATORY AND STUDIES: As dictated in history of present illness. Specifically, white count is elevated at 16,200, hemoglobin 10, platelets 288, normal lactic acid level.      ASSESSMENT:  Amber Hull is a 73-year-old diabetic with hypertension and chronic leg wounds, admitted with fever, right lower extremity pain with erythema and ulcers, suspicious for cellulitis.  Being admitted for further treatment.      PLAN:   1.  Right lower extremity cellulitis.  The patient will be seen by wound care nurse.  We will continue the patient on her diuretics.  The patient will be on Zosyn.  No documented history of MRSA.  The  patient had blood cultures obtained.   2.  Hypertension.  We will continue the patient on amlodipine, hydralazine, and hydrochlorothiazide.   3.  Hyperlipidemia.  We will continue the patient on her Lipitor.   4.  Depression.  Will continue the patient on Celexa.   5.  Diabetes.  The patient will be continued on Lantus.  We will start with 30 units at bedtime.  She is normally on 37 units.  Will also do sliding scale insulin.  The patient's mealtime NovoLog will be decreased from the usual outpatient doses to start out with.   6.  DVT prophylaxis: The patient will receive subcutaneous heparin.   7.  CODE STATUS:  FULL.         SAM NESS MD             D: 2018   T: 2018   MT:       Name:     ELSA BISHOP   MRN:      [id number]        Account:      [id number]   :      1945        Admitted:     08/15/2018                   Document: [id number]       cc: Gregg Teran DO

## 2018-08-16 NOTE — PLAN OF CARE
Problem: Patient Care Overview  Goal: Plan of Care/Patient Progress Review  OT: attempted for OT eval, however pt is down at x-ray then ultrasound.

## 2018-08-16 NOTE — PLAN OF CARE
Problem: Patient Care Overview  Goal: Plan of Care/Patient Progress Review  Outcome: No Change  Pt admitted last night at 0345 to 8830-1. Arrived from [facility] with ulcerations and redness to BLE. Up x2 to BR. PT/OT/WOC consults pending. , received lantus as ordered. Temp 99.1, /84. Denies fever/pain. Refused interventions offered. . Tachycardic most of shift. VSS on 2L NC. IV SL patent. IV zosyn started for cellulitis. +1/2 BLE edema. Discharge plan 1-2 days with IV antibiotics to switch to PO. Discharge pending progress, nursing will continue to monitor.

## 2018-08-16 NOTE — PLAN OF CARE
Problem: Patient Care Overview  Goal: Plan of Care/Patient Progress Review  PT:  Orders received, eval attempted. Pt denied participation in PT eval at this time. Hold PT eval

## 2018-08-16 NOTE — PHARMACY-ADMISSION MEDICATION HISTORY
Admission medication history interview status for the 8/15/2018  admission is complete. See EPIC admission navigator for prior to admission medications     Medication history source reliability:Good    Actions taken by pharmacist (provider contacted, etc):Reviewed patients nursing home records in paper chart.      Additional medication history information not noted on PTA med list :    1. Adjusted doses of Novolog.     2. Decreased atorvastatin from 10mg to 5mg daily.    3. Added:  Citalopram  Furosemide  Nystatin cream    4. Removed:  Miralax  Miconazole    Medication reconciliation/reorder completed by provider prior to medication history? Yes    Time spent in this activity: 25 minutes    Prior to Admission medications    Medication Sig Last Dose Taking? Auth Provider   acetaminophen (TYLENOL) 325 MG tablet Take 650 mg by mouth 3 times daily as needed for mild pain 8/15/2018 at 1530 Yes Reported, Patient   amLODIPine (NORVASC) 10 MG tablet Take 1 tablet (10 mg) by mouth daily 8/15/2018 at 0800 Yes Inessa Barton MD   aspirin  MG EC tablet Take 1 tablet (325 mg) by mouth daily 8/15/2018 at 0800 Yes Inessa Barton MD   atorvastatin (LIPITOR) 10 MG tablet Take 5 mg by mouth daily 8/15/2018 at 0800 Yes Unknown, Entered By History   citalopram (CELEXA) 10 MG tablet Take 10 mg by mouth daily 8/15/2018 at 0800 Yes Unknown, Entered By History   furosemide (LASIX) 40 MG tablet Take 40 mg by mouth 2 times daily 8/15/2018 at 1130 Yes Unknown, Entered By History   hydrALAZINE (APRESOLINE) 10 MG tablet Take 1 tablet (10 mg) by mouth 3 times daily 8/15/2018 at 1830 Yes Inessa Barton MD   hydrochlorothiazide (MICROZIDE) 12.5 MG capsule Take 1 capsule (12.5 mg) by mouth daily 8/15/2018 at 0800 Yes Inessa Barton MD   insulin aspart (NOVOLOG FLEXPEN) 100 UNIT/ML injection Inject 12 Units Subcutaneous 2 times daily (with meals) Lunch and dinner. Hold if BG<150. 8/15/2018 at 1200 Yes Unknown, Entered By History   insulin aspart  (NOVOLOG FLEXPEN) 100 UNIT/ML injection Inject 9 Units Subcutaneous daily (with breakfast) Hold if BG<150 8/15/2018 at 0900 Yes Unknown, Entered By History   nystatin (MYCOSTATIN) cream Apply topically 2 times daily Apply to groin, abdominal folds, and under both breasts. 8/15/2018 at 0700 Yes Unknown, Entered By History   nystatin (MYCOSTATIN) cream Apply topically 2 times daily as needed Apply to groin, abdominal folds, and under both breasts. Past Month at Unknown time Yes Unknown, Entered By History   guanFACINE (TENEX) 2 MG tablet Take 1 tablet (2 mg) by mouth daily 7/15/2018 at 0700  Inessa Barton MD   insulin glargine (LANTUS) 100 UNIT/ML injection Inject 37 Units Subcutaneous At Bedtime 8/13/2018 at 2230  Inessa Barton MD

## 2018-08-16 NOTE — PLAN OF CARE
Problem: Patient Care Overview  Goal: Plan of Care/Patient Progress Review  Pt a/o x 4, forgetful, flat lethargic. Elevated BP otherwise VSS on 3L O2 NC. Denies pain. Wound on LLE scabbing, red edema. Pt is BS checks. Incontinent of bowel and bladder. INfrequent coughs that are nonproductive. Needs encouragement to eat and move. Tolerating mod CHO diet, up with A1.

## 2018-08-16 NOTE — PROGRESS NOTES
RECEIVING UNIT ED HANDOFF REVIEW    ED Nurse Handoff Report was reviewed by: Georgina Byrne on August 16, 2018 at 1:05 AM

## 2018-08-16 NOTE — PROGRESS NOTES
08/16/18 1527   Quick Adds   Type of Visit Initial PT Evaluation   Living Environment   Lives With facility resident   Living Arrangements extended care facility   Home Accessibility no concerns   Living Environment Comment Pt resides in nursing home facility   Self-Care   Usual Activity Tolerance moderate   Current Activity Tolerance poor   Regular Exercise yes   Equipment Currently Used at Home walker, rolling;shower chair   Activity/Exercise/Self-Care Comment Pt states that she has meals prepared for her or walks to cafeteria in nursing home, pt sits on shower chair for bathing and requires assistance from staff. Pt states she was receiving PT services to work on strength, balance, walking multiple times per week   Functional Level Prior   Ambulation 1-->assistive equipment   Transferring 1-->assistive equipment   Toileting 1-->assistive equipment   Bathing 3-->assistive equipment and person   Fall history within last six months no   Which of the above functional risks had a recent onset or change? ambulation   Prior Functional Level Comment Pt Mod I with use of FWW. Pt able to walk to cafeteria for meals, transfer Mod I. IND with bed mobility at baseline   General Information   Onset of Illness/Injury or Date of Surgery - Date 08/15/18   Referring Physician Geoff Saldana MD   Patient/Family Goals Statement To return home   Pertinent History of Current Problem (include personal factors and/or comorbidities that impact the POC) Amber Hull is a 73-year-old diabetic with hypertension and chronic leg wounds, admitted with fever, right lower extremity pain with erythema and ulcers, suspicious for cellulitis. Became hypoxic on HD #1 and found to have RLL pneumonia. Admitted for further treatment.    Precautions/Limitations fall precautions   General Observations wears supp O2 at night when sleeping. On RA during the day at baseline   Cognitive Status Examination   Orientation person   Cognitive Comment Pt  unable to state why she is in the hospital, able to state the month, unable to state place needed cues for this   Pain Assessment   Patient Currently in Pain No   Integumentary/Edema   Integumentary/Edema Comments BLE edema, wounds to RLE and wound on R side of face see nursing notes for details   Posture    Posture Forward head position   Range of Motion (ROM)   ROM Comment RLE knee extension lacking ~30* with seated LAQ, not formally measured with goniometer. LLE full extension noted. Dec BLE DF to 0*   Strength   Strength Comments Pt demonstrates antigravity strength in BLE. Requires heavy use of UE push off to successfully stand from low surface   Bed Mobility   Bed Mobility Comments Not assessed, pt declining    Transfer Skills   Transfer Comments STS with FWW from recliner chair with BUE push off and CGA, places hands on front bar of walker upon standing   Gait   Gait Comments Pt ambulated 5' in room with FWW and CGA, clears BLE well, no overt LOB, CGA, maintains safe proximity to walker, declining further ambulation    Balance   Balance Comments Requiers BUE support on walker in standing to maintain balance    Sensory Examination   Sensory Perception Comments Intact to light touch bilaterally equal side to side   General Therapy Interventions   Planned Therapy Interventions balance training;bed mobility training;gait training;neuromuscular re-education;strengthening;transfer training;stretching   Clinical Impression   Criteria for Skilled Therapeutic Intervention yes, treatment indicated   PT Diagnosis Decreased functional mobility and activity tolerance from baseline   Influenced by the following impairments Medical condition, age, weakness, endurance   Functional limitations due to impairments See above   Clinical Presentation Evolving/Changing   Clinical Presentation Rationale limited tolerance for OOR activity or gait, assist for mobility   Clinical Decision Making (Complexity) Moderate complexity  "  Therapy Frequency` 5 times/week   Predicted Duration of Therapy Intervention (days/wks) 5 days   Anticipated Discharge Disposition Long Term Care Facility   Risk & Benefits of therapy have been explained Yes   Patient, Family & other staff in agreement with plan of care Yes   [facility] AM-PAC  \"6 Clicks\" V.2 Basic Mobility Inpatient Short Form   1. Turning from your back to your side while in a flat bed without using bedrails? 3 - A Little   2. Moving from lying on your back to sitting on the side of a flat bed without using bedrails? 3 - A Little   3. Moving to and from a bed to a chair (including a wheelchair)? 3 - A Little   4. Standing up from a chair using your arms (e.g., wheelchair, or bedside chair)? 3 - A Little   5. To walk in hospital room? 3 - A Little   6. Climbing 3-5 steps with a railing? 2 - A Lot   Basic Mobility Raw Score (Score out of 24.Lower scores equate to lower levels of function) 17   Total Evaluation Time   Total Evaluation Time (Minutes) 12     "

## 2018-08-17 LAB
ANION GAP SERPL CALCULATED.3IONS-SCNC: 9 MMOL/L (ref 3–14)
BUN SERPL-MCNC: 32 MG/DL (ref 7–30)
CALCIUM SERPL-MCNC: 8.4 MG/DL (ref 8.5–10.1)
CHLORIDE SERPL-SCNC: 110 MMOL/L (ref 94–109)
CO2 SERPL-SCNC: 22 MMOL/L (ref 20–32)
CREAT SERPL-MCNC: 1.86 MG/DL (ref 0.52–1.04)
ERYTHROCYTE [DISTWIDTH] IN BLOOD BY AUTOMATED COUNT: 15.6 % (ref 10–15)
GFR SERPL CREATININE-BSD FRML MDRD: 27 ML/MIN/1.7M2
GLUCOSE BLDC GLUCOMTR-MCNC: 110 MG/DL (ref 70–99)
GLUCOSE BLDC GLUCOMTR-MCNC: 111 MG/DL (ref 70–99)
GLUCOSE BLDC GLUCOMTR-MCNC: 118 MG/DL (ref 70–99)
GLUCOSE BLDC GLUCOMTR-MCNC: 125 MG/DL (ref 70–99)
GLUCOSE BLDC GLUCOMTR-MCNC: 132 MG/DL (ref 70–99)
GLUCOSE SERPL-MCNC: 110 MG/DL (ref 70–99)
HCT VFR BLD AUTO: 27.5 % (ref 35–47)
HGB BLD-MCNC: 8.7 G/DL (ref 11.7–15.7)
MCH RBC QN AUTO: 27.6 PG (ref 26.5–33)
MCHC RBC AUTO-ENTMCNC: 31.6 G/DL (ref 31.5–36.5)
MCV RBC AUTO: 87 FL (ref 78–100)
PLATELET # BLD AUTO: 227 10E9/L (ref 150–450)
POTASSIUM SERPL-SCNC: 4.3 MMOL/L (ref 3.4–5.3)
RBC # BLD AUTO: 3.15 10E12/L (ref 3.8–5.2)
SODIUM SERPL-SCNC: 141 MMOL/L (ref 133–144)
WBC # BLD AUTO: 11.8 10E9/L (ref 4–11)

## 2018-08-17 PROCEDURE — 25000132 ZZH RX MED GY IP 250 OP 250 PS 637: Mod: GY | Performed by: PHYSICIAN ASSISTANT

## 2018-08-17 PROCEDURE — 36415 COLL VENOUS BLD VENIPUNCTURE: CPT | Performed by: INTERNAL MEDICINE

## 2018-08-17 PROCEDURE — 25000132 ZZH RX MED GY IP 250 OP 250 PS 637: Mod: GY | Performed by: INTERNAL MEDICINE

## 2018-08-17 PROCEDURE — 12000000 ZZH R&B MED SURG/OB

## 2018-08-17 PROCEDURE — A9270 NON-COVERED ITEM OR SERVICE: HCPCS | Mod: GY | Performed by: INTERNAL MEDICINE

## 2018-08-17 PROCEDURE — 00000146 ZZHCL STATISTIC GLUCOSE BY METER IP

## 2018-08-17 PROCEDURE — 80048 BASIC METABOLIC PNL TOTAL CA: CPT | Performed by: INTERNAL MEDICINE

## 2018-08-17 PROCEDURE — A9270 NON-COVERED ITEM OR SERVICE: HCPCS | Mod: GY | Performed by: PHYSICIAN ASSISTANT

## 2018-08-17 PROCEDURE — 85027 COMPLETE CBC AUTOMATED: CPT | Performed by: INTERNAL MEDICINE

## 2018-08-17 PROCEDURE — 40000894 ZZH STATISTIC OT IP EVAL DEFER

## 2018-08-17 PROCEDURE — 99233 SBSQ HOSP IP/OBS HIGH 50: CPT | Performed by: INTERNAL MEDICINE

## 2018-08-17 PROCEDURE — 25000128 H RX IP 250 OP 636: Performed by: INTERNAL MEDICINE

## 2018-08-17 PROCEDURE — 25000131 ZZH RX MED GY IP 250 OP 636 PS 637: Mod: GY | Performed by: INTERNAL MEDICINE

## 2018-08-17 RX ORDER — SODIUM CHLORIDE 9 MG/ML
INJECTION, SOLUTION INTRAVENOUS CONTINUOUS
Status: DISCONTINUED | OUTPATIENT
Start: 2018-08-17 | End: 2018-08-17

## 2018-08-17 RX ADMIN — INSULIN ASPART 5 UNITS: 100 INJECTION, SOLUTION INTRAVENOUS; SUBCUTANEOUS at 11:26

## 2018-08-17 RX ADMIN — NYSTATIN: 100000 CREAM TOPICAL at 21:54

## 2018-08-17 RX ADMIN — PIPERACILLIN SODIUM,TAZOBACTAM SODIUM 3.38 G: 3; .375 INJECTION, POWDER, FOR SOLUTION INTRAVENOUS at 04:20

## 2018-08-17 RX ADMIN — PIPERACILLIN SODIUM,TAZOBACTAM SODIUM 3.38 G: 3; .375 INJECTION, POWDER, FOR SOLUTION INTRAVENOUS at 21:36

## 2018-08-17 RX ADMIN — SENNOSIDES AND DOCUSATE SODIUM 2 TABLET: 8.6; 5 TABLET ORAL at 08:57

## 2018-08-17 RX ADMIN — HYDRALAZINE HYDROCHLORIDE 10 MG: 10 TABLET ORAL at 21:43

## 2018-08-17 RX ADMIN — HYDROCHLOROTHIAZIDE 12.5 MG: 12.5 CAPSULE ORAL at 09:00

## 2018-08-17 RX ADMIN — INSULIN ASPART 5 UNITS: 100 INJECTION, SOLUTION INTRAVENOUS; SUBCUTANEOUS at 08:58

## 2018-08-17 RX ADMIN — HEPARIN SODIUM 5000 UNITS: 5000 INJECTION, SOLUTION INTRAVENOUS; SUBCUTANEOUS at 17:36

## 2018-08-17 RX ADMIN — INSULIN ASPART 5 UNITS: 100 INJECTION, SOLUTION INTRAVENOUS; SUBCUTANEOUS at 17:49

## 2018-08-17 RX ADMIN — HYDRALAZINE HYDROCHLORIDE 10 MG: 10 TABLET ORAL at 17:36

## 2018-08-17 RX ADMIN — CITALOPRAM HYDROBROMIDE 10 MG: 10 TABLET ORAL at 09:00

## 2018-08-17 RX ADMIN — GUANFACINE 2 MG: 1 TABLET ORAL at 09:00

## 2018-08-17 RX ADMIN — ASPIRIN 325 MG: 325 TABLET, DELAYED RELEASE ORAL at 08:59

## 2018-08-17 RX ADMIN — AMLODIPINE BESYLATE 10 MG: 10 TABLET ORAL at 08:59

## 2018-08-17 RX ADMIN — PIPERACILLIN SODIUM,TAZOBACTAM SODIUM 3.38 G: 3; .375 INJECTION, POWDER, FOR SOLUTION INTRAVENOUS at 11:14

## 2018-08-17 RX ADMIN — PIPERACILLIN SODIUM,TAZOBACTAM SODIUM 3.38 G: 3; .375 INJECTION, POWDER, FOR SOLUTION INTRAVENOUS at 17:35

## 2018-08-17 RX ADMIN — HYDRALAZINE HYDROCHLORIDE 10 MG: 10 TABLET ORAL at 09:00

## 2018-08-17 RX ADMIN — HEPARIN SODIUM 5000 UNITS: 5000 INJECTION, SOLUTION INTRAVENOUS; SUBCUTANEOUS at 04:20

## 2018-08-17 RX ADMIN — ACETAMINOPHEN 650 MG: 325 TABLET, FILM COATED ORAL at 21:52

## 2018-08-17 RX ADMIN — ATORVASTATIN CALCIUM 10 MG: 10 TABLET, FILM COATED ORAL at 08:59

## 2018-08-17 RX ADMIN — NYSTATIN: 100000 CREAM TOPICAL at 09:01

## 2018-08-17 RX ADMIN — INSULIN GLARGINE 30 UNITS: 100 INJECTION, SOLUTION SUBCUTANEOUS at 21:43

## 2018-08-17 RX ADMIN — ACETAMINOPHEN 650 MG: 325 TABLET, FILM COATED ORAL at 17:46

## 2018-08-17 ASSESSMENT — ACTIVITIES OF DAILY LIVING (ADL)
ADLS_ACUITY_SCORE: 20

## 2018-08-17 NOTE — PROGRESS NOTES
[facility]    Hospitalist Progress Note    Date of Service (when I saw the patient): 08/17/2018    Assessment & Plan   Amber Hull is a 73-year-old diabetic with hypertension and chronic leg wounds, admitted with fever, right lower extremity pain with erythema and ulcers, suspicious for cellulitis. Became hypoxic on HD #1 and found to have RLL pneumonia.       Sepsis due to right lower extremity cellulitis and pneumonia.    Noted with increased redness, swelling, pain of her chronic LE wounds as well as fever at her NH. Received IV ceftriaxone in the ED. LE US negative for DVT.    - Abx Changed to IV Zosyn on admit.  No documented history of MRSA.    - Blood culture pending.  - Pain control, Leg elevation.   - Fever and leukocytosis improving.   - WOC Rn appreciated.   - Consider a lymphedema consult once improving.   - Pneumonia treatment as below     RLL pneumonia with acute hypoxic respiratory failure:   Patient requiring 2-3 L of oxygen to maintain O2 saturation above 90% on HD #1.  She was not reported to be hypoxic in the emergency department.  She denies any respiratory symptoms such as cough or shortness of breath.  Temperature max of 101.2 with mild tachycardia. CXR with a RLL PNA.    - Continue treatment with IV Zosyn which will cover for cellulitis as well as potentially aspiration pneumonia.   - Tapering O2 today and down to 2 L.   - Patient does reside in a nursing home.  - Nebulizer as needed.    CKD Stage III  Baseline Cr has ranged from 1-1.7 over the last 2 years. Some component of ace-I induced kidney injury was suspected on top of her CKD on her recent stay and this was discontinued. Cr on admission was 1.4 and has trended up to 1.86 today. UA without signs of infection.   - Will hold her PTA Lasix 40 mg bid and hydrochlorothiazide 12.5 mg daily today.   - Follow BMP in am.   - Consider IVF if not improved tomorrow.  I will hold on giving her some fluid today in hopes of  preventing worsening LE edema in the setting of her cellulitis.      Paroxysmal A-Fib  Hypertension.   Blood pressures have been quite elevated on admission.  PTA is on amlodipine, hydralazine, lasix and hydrochlorothiazide. Previously on Metoprolol and Lisinopril but discontinued for worsening renal fxn and bradycardia.  Not on AC PTA for her A-Fib with a [id number] of 4 due to fall risk.  Had previously been on Apixaban but was not taking. Continued on full dose aspirin instead.   - Holding lasix and hydrochlorothiazide as above.   - Resume as able.   - Hydralazine IV as needed for SBP >180  - Adjust oral medications as necessary     Insulin dependent diabetes.    - On Lantus 37 units at home.  We will start with 30 units at bedtime.    - Sliding scale insulin.    - The patient's mealtime NovoLog will be decreased from the usual outpatient doses to 5 U tid.     Depression  Possible dementia     - Continue Celexa.      DVT Prophylaxis: Heparin SQ  Code Status: Full Code    # Pain Assessment:  Current Pain Score 8/16/2018   Patient currently in pain? denies   Pain score (0-10) -   Pain location -   Pain descriptors -   - Amber is experiencing pain due to cellulitis. Pain management was discussed and the plan was created in a collaborative fashion.  Amber's response to the current recommendations: engaged  - Pharmacologic adjuvants: Acetaminophen      Disposition:  Continue with the current IV Abx for now. Holding her diuretics and will follow her bmp in am. Encourage leg elevation. Taper O2.  Anticipate discharge on 8/19.       Han Killian       Interval History   Overall seems to be feeling better today. Pain improving as well as her swelling.  Tolerating a diet. Tapering O2. 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     -Data reviewed today: I reviewed all new labs and imaging results over the last 24 hours. I personally reviewed no images or EKG's today.    Physical Exam   Temp: 99.7  F  (37.6  C) Temp src: Oral BP: 136/61   Heart Rate: 88 Resp: 16 SpO2: 92 % O2 Device: Nasal cannula Oxygen Delivery: 3 LPM  Vitals:    08/15/18 2111 08/16/18 0321 08/17/18 0300   Weight: 90.7 kg (200 lb) 115.8 kg (255 lb 6.4 oz) 117.6 kg (259 lb 3.2 oz)     Vital Signs with Ranges  Temp:  [98.6  F (37  C)-99.7  F (37.6  C)] 99.7  F (37.6  C)  Heart Rate:  [] 88  Resp:  [14-18] 16  BP: (136-170)/(61-85) 136/61  SpO2:  [91 %-94 %] 92 %  I/O last 3 completed shifts:  In: 320 [P.O.:120; I.V.:200]  Out: 225 [Urine:225]    Gen: Patient in no acute distress.  Appears comfortable.  Heart:  S1S2+, regular rate and rhythm, No murmurs.  Lungs:  Clear to auscultation at apices, no wheezing, no rales. Rhonchi in RLL.    Abdomen:  Soft, non tender, non distended, bowel sounds positive.  Extremities:  B/L LE chronic skin changes.  RLE erythema and ulcers. Tender to touch.     Medications       amLODIPine  10 mg Oral Daily     aspirin  325 mg Oral Daily     atorvastatin (LIPITOR) tablet 10 mg  10 mg Oral Daily     citalopram  10 mg Oral Daily     furosemide  40 mg Oral BID     guanFACINE  2 mg Oral Daily     heparin  5,000 Units Subcutaneous Q12H     hydrALAZINE  10 mg Oral TID     hydrochlorothiazide  12.5 mg Oral Daily     insulin aspart  5 Units Subcutaneous TID w/meals     insulin aspart  1-7 Units Subcutaneous TID AC     insulin aspart  1-5 Units Subcutaneous At Bedtime     insulin glargine  30 Units Subcutaneous At Bedtime     nystatin   Topical BID     piperacillin-tazobactam  3.375 g Intravenous Q6H     senna-docusate  1 tablet Oral BID    Or     senna-docusate  2 tablet Oral BID       Data     Recent Labs  Lab 08/17/18  0722 08/16/18  0730 08/15/18  2200   WBC 11.8*  --  16.2*   HGB 8.7*  --  10.0*   MCV 87  --  87    287 288     --  138   POTASSIUM 4.3  --  5.0   CHLORIDE 110*  --  108   CO2 22  --  20   BUN 32*  --  29   CR 1.86*  --  1.40*   ANIONGAP 9  --  10   IDANIA 8.4*  --  8.7   *  --  120*    ALBUMIN  --   --  2.3*   PROTTOTAL  --   --  7.1   BILITOTAL  --   --  0.4   ALKPHOS  --   --  215*   ALT  --   --  20   AST  --   --  23       Recent Results (from the past 24 hour(s))   XR Chest 2 Views    Narrative    XR CHEST 2 VW 8/16/2018 10:11 AM    HISTORY: Hypoxia.    COMPARISON: None.    FINDINGS: Right perihilar consolidation. Background pulmonary vascular  congestion. No pleural effusion or pneumothorax. Normal heart size.      Impression    IMPRESSION: Right lower lobe pneumonia.    MARGE PAGAN MD   US Lower Extremity Venous Duplex Bilateral    Narrative    PROCEDURE:  Venous Doppler ultrasound of the bilateral lower extremities    DATE OF PROCEDURE:  8/16/2018 10:36 AM    CLINICAL HISTORY/INDICATION:   73-year-old female with right lower extremity swelling and redness.    COMPARISON:   None relevant    TECHNIQUE:   Grayscale, color-flow, and spectral waveform analysis were performed  of the deep veins of the bilateral lower extremities    FINDINGS:   The right common femoral vein, superficial femoral vein and popliteal  vein demonstrate normal compressibility, spectral waveform, color flow  and augmentation.    The left common femoral vein, superficial femoral vein and popliteal  vein demonstrate normal compressibility, spectral waveform, color flow  and augmentation.    The right posterior tibial vein, peroneal vein, and greater saphenous  vein are compressible.    The left posterior tibial and greater saphenous vein are compressible.  The left peroneal vein is not seen.      Impression    IMPRESSION:   1. No evidence of deep venous thrombosis in the right lower extremity  2. No evidence of deep venous thrombosis in the left lower extremity    NO MENON MD

## 2018-08-17 NOTE — PLAN OF CARE
"Problem: Patient Care Overview  Goal: Plan of Care/Patient Progress Review  Discharge Planner OT   Patient plan for discharge: Return to ECF    Current status: Order received, chart reviewed, eval attempted. Pt adamantly declined OT. Pt is familiar to this writer from previous hospitalizations. Pt is aware of OT role and importance; however, states \"I do not care for it. I will continue with PT, but not OT.\" Pt reports living in ECF where she receives A for ADLs as needed. Per PT, pt requiring CGA and FWW for functional transfers and short ambulation.     Barriers to return to prior living situation: Current level of A required    Recommendations for discharge: Anticipate discharge back to ECF with increased services for ADLs as needed and home PT    Rationale for recommendations: Pt limited by chronic B LE edema, pain, and decreased activity tolerance. Pt will continue to work with PT during hospitalization. Discontinuation of OT services at this time. Defer further discharge recommendations to PT.        Entered by: Chelle Desir 08/17/2018 10:37 AM         "

## 2018-08-17 NOTE — PLAN OF CARE
Problem: Patient Care Overview  Goal: Plan of Care/Patient Progress Review  Outcome: No Change  Pt A&Ox4 but forgetful. Calm and cooperative. LS diminished; EID but able to wean O2 down to 1L. sats remain stable in the 90s. Denies chest pain. RLE reddened/weepy; wound care done/ dressing changed. 9/10 RLE pain this evening, tylenol given/ helpful. Up with 1/GB/walker. Incontinent of urine at times. Creatinine rising; PO diuretics on hold. PO fluid encouraged. Tolerating diet. Blood sugars stable. Will continue to monitor

## 2018-08-18 LAB
ANION GAP SERPL CALCULATED.3IONS-SCNC: 10 MMOL/L (ref 3–14)
BUN SERPL-MCNC: 38 MG/DL (ref 7–30)
CALCIUM SERPL-MCNC: 8.4 MG/DL (ref 8.5–10.1)
CHLORIDE SERPL-SCNC: 109 MMOL/L (ref 94–109)
CO2 SERPL-SCNC: 22 MMOL/L (ref 20–32)
CREAT SERPL-MCNC: 2.1 MG/DL (ref 0.52–1.04)
GFR SERPL CREATININE-BSD FRML MDRD: 23 ML/MIN/1.7M2
GLUCOSE BLDC GLUCOMTR-MCNC: 107 MG/DL (ref 70–99)
GLUCOSE BLDC GLUCOMTR-MCNC: 108 MG/DL (ref 70–99)
GLUCOSE BLDC GLUCOMTR-MCNC: 116 MG/DL (ref 70–99)
GLUCOSE BLDC GLUCOMTR-MCNC: 126 MG/DL (ref 70–99)
GLUCOSE BLDC GLUCOMTR-MCNC: 154 MG/DL (ref 70–99)
GLUCOSE BLDC GLUCOMTR-MCNC: 161 MG/DL (ref 70–99)
GLUCOSE SERPL-MCNC: 120 MG/DL (ref 70–99)
POTASSIUM SERPL-SCNC: 4.5 MMOL/L (ref 3.4–5.3)
SODIUM SERPL-SCNC: 141 MMOL/L (ref 133–144)
SODIUM UR-SCNC: 29 MMOL/L

## 2018-08-18 PROCEDURE — 80048 BASIC METABOLIC PNL TOTAL CA: CPT | Performed by: INTERNAL MEDICINE

## 2018-08-18 PROCEDURE — 12000000 ZZH R&B MED SURG/OB

## 2018-08-18 PROCEDURE — 25000131 ZZH RX MED GY IP 250 OP 636 PS 637: Mod: GY | Performed by: INTERNAL MEDICINE

## 2018-08-18 PROCEDURE — A9270 NON-COVERED ITEM OR SERVICE: HCPCS | Mod: GY | Performed by: PHYSICIAN ASSISTANT

## 2018-08-18 PROCEDURE — 99233 SBSQ HOSP IP/OBS HIGH 50: CPT | Performed by: INTERNAL MEDICINE

## 2018-08-18 PROCEDURE — 25000132 ZZH RX MED GY IP 250 OP 250 PS 637: Mod: GY | Performed by: INTERNAL MEDICINE

## 2018-08-18 PROCEDURE — 00000146 ZZHCL STATISTIC GLUCOSE BY METER IP

## 2018-08-18 PROCEDURE — 25000132 ZZH RX MED GY IP 250 OP 250 PS 637: Mod: GY | Performed by: PHYSICIAN ASSISTANT

## 2018-08-18 PROCEDURE — 36415 COLL VENOUS BLD VENIPUNCTURE: CPT | Performed by: INTERNAL MEDICINE

## 2018-08-18 PROCEDURE — A9270 NON-COVERED ITEM OR SERVICE: HCPCS | Mod: GY | Performed by: INTERNAL MEDICINE

## 2018-08-18 PROCEDURE — 84300 ASSAY OF URINE SODIUM: CPT | Performed by: INTERNAL MEDICINE

## 2018-08-18 PROCEDURE — 25000128 H RX IP 250 OP 636: Performed by: INTERNAL MEDICINE

## 2018-08-18 RX ADMIN — PIPERACILLIN SODIUM,TAZOBACTAM SODIUM 3.38 G: 3; .375 INJECTION, POWDER, FOR SOLUTION INTRAVENOUS at 03:35

## 2018-08-18 RX ADMIN — INSULIN ASPART 5 UNITS: 100 INJECTION, SOLUTION INTRAVENOUS; SUBCUTANEOUS at 11:49

## 2018-08-18 RX ADMIN — HYDRALAZINE HYDROCHLORIDE 10 MG: 10 TABLET ORAL at 21:06

## 2018-08-18 RX ADMIN — ACETAMINOPHEN 650 MG: 325 TABLET, FILM COATED ORAL at 08:15

## 2018-08-18 RX ADMIN — AMLODIPINE BESYLATE 10 MG: 10 TABLET ORAL at 08:10

## 2018-08-18 RX ADMIN — SENNOSIDES AND DOCUSATE SODIUM 2 TABLET: 8.6; 5 TABLET ORAL at 08:10

## 2018-08-18 RX ADMIN — NYSTATIN: 100000 CREAM TOPICAL at 21:05

## 2018-08-18 RX ADMIN — HEPARIN SODIUM 5000 UNITS: 5000 INJECTION, SOLUTION INTRAVENOUS; SUBCUTANEOUS at 03:35

## 2018-08-18 RX ADMIN — PIPERACILLIN SODIUM,TAZOBACTAM SODIUM 3.38 G: 3; .375 INJECTION, POWDER, FOR SOLUTION INTRAVENOUS at 11:46

## 2018-08-18 RX ADMIN — INSULIN GLARGINE 30 UNITS: 100 INJECTION, SOLUTION SUBCUTANEOUS at 21:09

## 2018-08-18 RX ADMIN — CITALOPRAM HYDROBROMIDE 10 MG: 10 TABLET ORAL at 08:10

## 2018-08-18 RX ADMIN — HEPARIN SODIUM 5000 UNITS: 5000 INJECTION, SOLUTION INTRAVENOUS; SUBCUTANEOUS at 16:42

## 2018-08-18 RX ADMIN — INSULIN ASPART 5 UNITS: 100 INJECTION, SOLUTION INTRAVENOUS; SUBCUTANEOUS at 17:42

## 2018-08-18 RX ADMIN — HYDRALAZINE HYDROCHLORIDE 10 MG: 10 TABLET ORAL at 08:11

## 2018-08-18 RX ADMIN — HYDRALAZINE HYDROCHLORIDE 10 MG: 10 TABLET ORAL at 16:42

## 2018-08-18 RX ADMIN — ACETAMINOPHEN 650 MG: 325 TABLET, FILM COATED ORAL at 16:42

## 2018-08-18 RX ADMIN — INSULIN ASPART 5 UNITS: 100 INJECTION, SOLUTION INTRAVENOUS; SUBCUTANEOUS at 07:52

## 2018-08-18 RX ADMIN — GUANFACINE 2 MG: 1 TABLET ORAL at 08:11

## 2018-08-18 RX ADMIN — ATORVASTATIN CALCIUM 10 MG: 10 TABLET, FILM COATED ORAL at 08:11

## 2018-08-18 RX ADMIN — PIPERACILLIN SODIUM,TAZOBACTAM SODIUM 3.38 G: 3; .375 INJECTION, POWDER, FOR SOLUTION INTRAVENOUS at 23:55

## 2018-08-18 RX ADMIN — ASPIRIN 325 MG: 325 TABLET, DELAYED RELEASE ORAL at 08:11

## 2018-08-18 RX ADMIN — PIPERACILLIN SODIUM,TAZOBACTAM SODIUM 3.38 G: 3; .375 INJECTION, POWDER, FOR SOLUTION INTRAVENOUS at 17:14

## 2018-08-18 ASSESSMENT — ACTIVITIES OF DAILY LIVING (ADL)
ADLS_ACUITY_SCORE: 19
ADLS_ACUITY_SCORE: 20

## 2018-08-18 NOTE — PLAN OF CARE
Problem: Patient Care Overview  Goal: Plan of Care/Patient Progress Review  Outcome: No Change   A/ox4, forgetful at times. VSS. C/o RLE leg pain, PRN Tyenol given x1, effective. Weaned off 1L O2 via NC, stating in mid 90's on RA. Wound care completed to RLE per WOC orders - dressing CDI. BLE +3-4, jeremy and red. Abdominal and breast folds moist and pink - interdry in place, patient refused scheduled nystatin cream. Patient up in chair for most of shift, encouraged BLE elevation but patient refused at times to elevate while in recliner. Patient refused to ambulate this shift in hallway. Continent of bowel and bladder this shift, BMx2 soft and formed. Coccyx red and blanchable. Creatinine elevated to 2.1, Na random urine test ordered - urine collection still needed. BC pending. Discharge pending kidney function. Continue to monitor.

## 2018-08-18 NOTE — PROGRESS NOTES
MD Notification    Notified Person: MD    Notified Person Name:  Ubaldo    Notification Date/Time: 8/18/18 1240 -- re-paged at 1300    Notification Interaction: Web base page    Purpose of Notification: Creatinine increased to 2.10    Orders Received: No new orders received at 1337, paged MD x2    Comments:

## 2018-08-18 NOTE — PLAN OF CARE
Problem: Patient Care Overview  Goal: Plan of Care/Patient Progress Review  A/O x 4, forgetful.  Satting well on 1L O2, VSS.  C/O 8/10 pain in RLE, Tylenol given x 1 with good relief. EID with activity. Dressing on wound CDI.  Incontinent of urine.  Turned/ repositioined q 2 h.  Skin care done under breast and abdominal folds.   Continue to hold PO diuretics. Blood sugars stable, no sliding scale insulin needed. Infrequent, non-productive cough.  Continue to monitor.

## 2018-08-18 NOTE — PLAN OF CARE
Problem: Patient Care Overview  Goal: Plan of Care/Patient Progress Review  PT: Pt declining PT intervention for remainder of hospital stay, pt educated on importance of OOB activity and ambulation to maintain strength, IND, overall health, lung function. Continues to decline despite educations. Declining seated exercises as well. Cancel therapy

## 2018-08-18 NOTE — PROGRESS NOTES
[facility]    Hospitalist Progress Note    Assessment & Plan       Amber Hull is a 73-year-old diabetic with hypertension and chronic leg wounds, admitted with fever, right lower extremity pain with erythema and ulcers, suspicious for cellulitis. Became hypoxic on HD #1 and found to have RLL pneumonia.       Sepsis due to right lower extremity cellulitis and pneumonia.    Noted with increased redness, swelling, pain of her chronic LE wounds as well as fever at her NH. Received IV ceftriaxone in the ED. LE US negative for DVT.    - Abx Changed to IV Zosyn on admit.  No documented history of MRSA.    - Blood culture negative todate   - Pain control, Leg elevation.   - Fever and leukocytosis improving.   - WOC Rn appreciated.   - lymphedema consult ordered  - Pneumonia treatment as below      RLL pneumonia with acute hypoxic respiratory failure:   Patient requiring 2-3 L of oxygen to maintain O2 saturation above 90% on HD #1.  She was not reported to be hypoxic in the emergency department.  She denies any respiratory symptoms such as cough or shortness of breath.  Temperature max of 101.2 with mild tachycardia. CXR with a RLL PNA.    - Continue treatment with IV Zosyn which will cover for cellulitis as well as potentially aspiration pneumonia.   - wean oxygen as possible   - Patient does reside in a nursing home.  - Nebulizer as needed.     CKD Stage III  Baseline Cr has ranged from 1-1.7 over the last 2 years. Some component of ace-I induced kidney injury was suspected on top of her CKD on her recent stay and this was discontinued. Cr on admission was 1.4 and has trended up to 1.86 today. UA without signs of infection.   - Will hold her PTA Lasix 40 mg bid and hydrochlorothiazide 12.5 mg daily today.   - Follow BMP in am.   - urine sodium ordered, wont order fluids or lasix today, repeat basic metabolic panel in AM and reevaluate , her creatinine went upto 2.10     Paroxysmal  A-Fib  Hypertension.   Blood pressures have been quite elevated on admission.  PTA is on amlodipine, hydralazine, lasix and hydrochlorothiazide. Previously on Metoprolol and Lisinopril but discontinued for worsening renal fxn and bradycardia.  Not on AC PTA for her A-Fib with a [id number] of 4 due to fall risk.  Had previously been on Apixaban but was not taking. Continued on full dose aspirin instead.   - Holding lasix and hydrochlorothiazide as above.   - Resume as able.   - Hydralazine IV as needed for SBP >180  - Adjust oral medications as necessary  -basic metabolic panel in am       Insulin dependent diabetes.    - On Lantus 37 units at home.  We will start with 30 units at bedtime.    - Sliding scale insulin.    - The patient's mealtime NovoLog will be decreased from the usual outpatient doses to 5 U tid.   -blood sugars controlled in the current regime.     Depression  Possible dementia     - Continue Celexa.       DVT Prophylaxis: Heparin SQ  Code Status: Full Code     Disposition: Expected discharge once cr stable.    Ying Conner MD  Text Page   (7am to 6pm)    Interval History   She is happy that her lower extremity edema has improved, diuretics on hold sicne yesterday , her creatinine has elevated to 2, discussed keeping leg elevated.    -Data reviewed today: I reviewed all new labs and imaging results over the last 24 hours.     Physical Exam   Temp: 96.3  F (35.7  C) Temp src: Oral BP: 136/59   Heart Rate: 65 Resp: 18 SpO2: 93 % O2 Device: None (Room air) Oxygen Delivery: 1 LPM  Vitals:    08/16/18 0321 08/17/18 0300 08/18/18 0615   Weight: 115.8 kg (255 lb 6.4 oz) 117.6 kg (259 lb 3.2 oz) 115.7 kg (255 lb)     Vital Signs with Ranges  Temp:  [96.3  F (35.7  C)-98.3  F (36.8  C)] 96.3  F (35.7  C)  Heart Rate:  [57-75] 65  Resp:  [16-18] 18  BP: (135-167)/() 136/59  SpO2:  [92 %-95 %] 93 %  I/O last 3 completed shifts:  In: 900 [P.O.:900]  Out: -     Constitutional: Awake, alert, cooperative,  no apparent distress, obese   Respiratory: bilateral basilar crackles+  Cardiovascular: Regular rate and rhythm, normal S1 and S2, and no murmur noted  GI: Normal bowel sounds, soft, non-distended, non-tender  Skin/Integumen: bilateral lower extremity edema  Non pitting+, right lower extremity erythematous, ulcer x 2  +  Neuro : moving all 4 extremities, no focal deficit noted     Medications       amLODIPine  10 mg Oral Daily     aspirin  325 mg Oral Daily     atorvastatin (LIPITOR) tablet 10 mg  10 mg Oral Daily     citalopram  10 mg Oral Daily     guanFACINE  2 mg Oral Daily     heparin  5,000 Units Subcutaneous Q12H     hydrALAZINE  10 mg Oral TID     insulin aspart  5 Units Subcutaneous TID w/meals     insulin aspart  1-7 Units Subcutaneous TID AC     insulin aspart  1-5 Units Subcutaneous At Bedtime     insulin glargine  30 Units Subcutaneous At Bedtime     nystatin   Topical BID     piperacillin-tazobactam  3.375 g Intravenous Q6H     senna-docusate  1 tablet Oral BID    Or     senna-docusate  2 tablet Oral BID       Data     Recent Labs  Lab 08/18/18  1156 08/17/18  0722 08/16/18  0730 08/15/18  2200   WBC  --  11.8*  --  16.2*   HGB  --  8.7*  --  10.0*   MCV  --  87  --  87   PLT  --  227 287 288    141  --  138   POTASSIUM 4.5 4.3  --  5.0   CHLORIDE 109 110*  --  108   CO2 22 22  --  20   BUN 38* 32*  --  29   CR 2.10* 1.86*  --  1.40*   ANIONGAP 10 9  --  10   IDANIA 8.4* 8.4*  --  8.7   * 110*  --  120*   ALBUMIN  --   --   --  2.3*   PROTTOTAL  --   --   --  7.1   BILITOTAL  --   --   --  0.4   ALKPHOS  --   --   --  215*   ALT  --   --   --  20   AST  --   --   --  23       Recent Labs  Lab 08/18/18  1156 08/18/18  1130 08/18/18  1119 08/18/18  0724 08/18/18  0143 08/17/18  2140  08/17/18  0722  08/15/18  2200   *  --   --   --   --   --   --  110*  --  120*   BGM  --  116* 126* 107* 108* 125*  < >  --   < >  --    < > = values in this interval not displayed.    Imaging:   No results  found for this or any previous visit (from the past 24 hour(s)).

## 2018-08-18 NOTE — PROGRESS NOTES
MD Notification    Notified Person: MD    Notified Person Name:  Ubaldo    Notification Date/Time: 8/18/18 4094    Notification Interaction: Web-base page --- repaged at 1050    Purpose of Notification: No BMP ordered this morning. Creatinine jumped to 1.86 yesterday from 1.4. Want to recheck today?    Orders Received: orders placed by MD - BMP ordered    Comments:

## 2018-08-19 LAB
ANION GAP SERPL CALCULATED.3IONS-SCNC: 8 MMOL/L (ref 3–14)
BUN SERPL-MCNC: 42 MG/DL (ref 7–30)
CALCIUM SERPL-MCNC: 8.5 MG/DL (ref 8.5–10.1)
CHLORIDE SERPL-SCNC: 109 MMOL/L (ref 94–109)
CO2 SERPL-SCNC: 22 MMOL/L (ref 20–32)
CREAT SERPL-MCNC: 2.21 MG/DL (ref 0.52–1.04)
GFR SERPL CREATININE-BSD FRML MDRD: 22 ML/MIN/1.7M2
GLUCOSE BLDC GLUCOMTR-MCNC: 122 MG/DL (ref 70–99)
GLUCOSE BLDC GLUCOMTR-MCNC: 129 MG/DL (ref 70–99)
GLUCOSE BLDC GLUCOMTR-MCNC: 137 MG/DL (ref 70–99)
GLUCOSE BLDC GLUCOMTR-MCNC: 139 MG/DL (ref 70–99)
GLUCOSE SERPL-MCNC: 134 MG/DL (ref 70–99)
PLATELET # BLD AUTO: 293 10E9/L (ref 150–450)
POTASSIUM SERPL-SCNC: 4.7 MMOL/L (ref 3.4–5.3)
SODIUM SERPL-SCNC: 139 MMOL/L (ref 133–144)

## 2018-08-19 PROCEDURE — 25000128 H RX IP 250 OP 636: Performed by: INTERNAL MEDICINE

## 2018-08-19 PROCEDURE — 25000131 ZZH RX MED GY IP 250 OP 636 PS 637: Mod: GY | Performed by: INTERNAL MEDICINE

## 2018-08-19 PROCEDURE — A9270 NON-COVERED ITEM OR SERVICE: HCPCS | Mod: GY | Performed by: INTERNAL MEDICINE

## 2018-08-19 PROCEDURE — 99232 SBSQ HOSP IP/OBS MODERATE 35: CPT | Performed by: INTERNAL MEDICINE

## 2018-08-19 PROCEDURE — 25000132 ZZH RX MED GY IP 250 OP 250 PS 637: Mod: GY | Performed by: INTERNAL MEDICINE

## 2018-08-19 PROCEDURE — 25000132 ZZH RX MED GY IP 250 OP 250 PS 637: Mod: GY | Performed by: PHYSICIAN ASSISTANT

## 2018-08-19 PROCEDURE — 80048 BASIC METABOLIC PNL TOTAL CA: CPT | Performed by: INTERNAL MEDICINE

## 2018-08-19 PROCEDURE — A9270 NON-COVERED ITEM OR SERVICE: HCPCS | Mod: GY | Performed by: PHYSICIAN ASSISTANT

## 2018-08-19 PROCEDURE — 36415 COLL VENOUS BLD VENIPUNCTURE: CPT | Performed by: INTERNAL MEDICINE

## 2018-08-19 PROCEDURE — 00000146 ZZHCL STATISTIC GLUCOSE BY METER IP

## 2018-08-19 PROCEDURE — 85049 AUTOMATED PLATELET COUNT: CPT | Performed by: INTERNAL MEDICINE

## 2018-08-19 PROCEDURE — 12000000 ZZH R&B MED SURG/OB

## 2018-08-19 RX ORDER — PIPERACILLIN SODIUM, TAZOBACTAM SODIUM 2; .25 G/10ML; G/10ML
2.25 INJECTION, POWDER, LYOPHILIZED, FOR SOLUTION INTRAVENOUS EVERY 6 HOURS
Status: DISCONTINUED | OUTPATIENT
Start: 2018-08-19 | End: 2018-08-20 | Stop reason: HOSPADM

## 2018-08-19 RX ADMIN — INSULIN ASPART 5 UNITS: 100 INJECTION, SOLUTION INTRAVENOUS; SUBCUTANEOUS at 11:42

## 2018-08-19 RX ADMIN — SODIUM CHLORIDE 1000 ML: 9 INJECTION, SOLUTION INTRAVENOUS at 09:21

## 2018-08-19 RX ADMIN — ACETAMINOPHEN 650 MG: 325 TABLET, FILM COATED ORAL at 06:51

## 2018-08-19 RX ADMIN — HEPARIN SODIUM 5000 UNITS: 5000 INJECTION, SOLUTION INTRAVENOUS; SUBCUTANEOUS at 04:43

## 2018-08-19 RX ADMIN — PIPERACILLIN SODIUM,TAZOBACTAM SODIUM 2.25 G: 2; .25 INJECTION, POWDER, FOR SOLUTION INTRAVENOUS at 11:42

## 2018-08-19 RX ADMIN — ATORVASTATIN CALCIUM 10 MG: 10 TABLET, FILM COATED ORAL at 07:48

## 2018-08-19 RX ADMIN — INSULIN GLARGINE 30 UNITS: 100 INJECTION, SOLUTION SUBCUTANEOUS at 21:23

## 2018-08-19 RX ADMIN — HYDRALAZINE HYDROCHLORIDE 10 MG: 10 TABLET ORAL at 16:19

## 2018-08-19 RX ADMIN — INSULIN ASPART 5 UNITS: 100 INJECTION, SOLUTION INTRAVENOUS; SUBCUTANEOUS at 17:32

## 2018-08-19 RX ADMIN — ASPIRIN 325 MG: 325 TABLET, DELAYED RELEASE ORAL at 07:48

## 2018-08-19 RX ADMIN — CITALOPRAM HYDROBROMIDE 10 MG: 10 TABLET ORAL at 07:48

## 2018-08-19 RX ADMIN — PIPERACILLIN SODIUM,TAZOBACTAM SODIUM 3.38 G: 3; .375 INJECTION, POWDER, FOR SOLUTION INTRAVENOUS at 04:43

## 2018-08-19 RX ADMIN — PIPERACILLIN SODIUM,TAZOBACTAM SODIUM 2.25 G: 2; .25 INJECTION, POWDER, FOR SOLUTION INTRAVENOUS at 16:53

## 2018-08-19 RX ADMIN — SENNOSIDES AND DOCUSATE SODIUM 1 TABLET: 8.6; 5 TABLET ORAL at 07:47

## 2018-08-19 RX ADMIN — HEPARIN SODIUM 5000 UNITS: 5000 INJECTION, SOLUTION INTRAVENOUS; SUBCUTANEOUS at 16:19

## 2018-08-19 RX ADMIN — HYDRALAZINE HYDROCHLORIDE 10 MG: 10 TABLET ORAL at 07:47

## 2018-08-19 RX ADMIN — INSULIN ASPART 5 UNITS: 100 INJECTION, SOLUTION INTRAVENOUS; SUBCUTANEOUS at 08:07

## 2018-08-19 RX ADMIN — HYDRALAZINE HYDROCHLORIDE 10 MG: 10 TABLET ORAL at 21:04

## 2018-08-19 RX ADMIN — GUANFACINE 2 MG: 1 TABLET ORAL at 07:48

## 2018-08-19 RX ADMIN — PIPERACILLIN SODIUM,TAZOBACTAM SODIUM 2.25 G: 2; .25 INJECTION, POWDER, FOR SOLUTION INTRAVENOUS at 23:25

## 2018-08-19 RX ADMIN — AMLODIPINE BESYLATE 10 MG: 10 TABLET ORAL at 07:48

## 2018-08-19 RX ADMIN — ACETAMINOPHEN 650 MG: 325 TABLET, FILM COATED ORAL at 12:25

## 2018-08-19 ASSESSMENT — ACTIVITIES OF DAILY LIVING (ADL)
ADLS_ACUITY_SCORE: 19

## 2018-08-19 NOTE — PROGRESS NOTES
[facility]    Hospitalist Progress Note    Assessment & Plan       Amber Hull is a 73-year-old diabetic with hypertension and chronic leg wounds, admitted with fever, right lower extremity pain with erythema and ulcers, suspicious for cellulitis. Became hypoxic on HD #1 and found to have RLL pneumonia.       Sepsis due to right lower extremity cellulitis and pneumonia.    Noted with increased redness, swelling, pain of her chronic LE wounds as well as fever at her NH. Received IV ceftriaxone in the ED. LE US negative for DVT.    - Abx Changed to IV Zosyn on admit.  No documented history of MRSA.    - Blood culture negative todate   - Pain control, Leg elevation.   - Fever and leukocytosis improving.   - WOC Rn appreciated.   - lymphedema team to follow   - Pneumonia treatment as below      RLL pneumonia with acute hypoxic respiratory failure:   Patient requiring 2-3 L of oxygen to maintain O2 saturation above 90% on HD #1.  She was not reported to be hypoxic in the emergency department.  She denies any respiratory symptoms such as cough or shortness of breath.  Temperature max of 101.2 with mild tachycardia. CXR with a RLL PNA.    - Continue treatment with IV Zosyn which will cover for cellulitis as well as potentially aspiration pneumonia.   - wean oxygen as possible   - Patient does reside in a nursing home.  - Nebulizer as needed.     CKD Stage III  Baseline Cr has ranged from 1-1.7 over the last 2 years. Some component of ace-I induced kidney injury was suspected on top of her CKD on her recent stay and this was discontinued. Cr on admission was 1.4 and has trended up to 1.86 today. UA without signs of infection.   - Will hold her PTA Lasix 40 mg bid and hydrochlorothiazide 12.5 mg daily for past 2 days   -creatinine going up, will try some fluids today , basic metabolic panel in am      Paroxysmal A-Fib  Hypertension.   Blood pressures have been quite elevated on admission.  PTA is on  amlodipine, hydralazine, lasix and hydrochlorothiazide. Previously on Metoprolol and Lisinopril but discontinued for worsening renal fxn and bradycardia.  Not on AC PTA for her A-Fib with a [id number] of 4 due to fall risk.  Had previously been on Apixaban but was not taking. Continued on full dose aspirin instead.   - Holding lasix and hydrochlorothiazide as above.   - Hydralazine IV as needed for SBP >180  - Adjust oral medications as necessary  -basic metabolic panel in am       Insulin dependent diabetes.    - On Lantus 37 units at home.  We will start with 30 units at bedtime.    - Sliding scale insulin.    - The patient's mealtime NovoLog will be decreased from the usual outpatient doses to 5 U tid.   -blood sugars controlled in the current regime.     Depression  Possible dementia     - Continue Celexa.       DVT Prophylaxis: Heparin SQ  Code Status: Full Code     Disposition: Expected discharge once cr stable and cellulitis improves, possibly 8/20    Ying Conner MD  Text Page   (7am to 6pm)    Interval History   Resting, shortness of breath  Only with activity, lower extremity erythema improved, her creatinine has gotten worse, currently lasix on hold, discussed about some iv fluids.urine culture negative.    -Data reviewed today: I reviewed all new labs and imaging results over the last 24 hours.     Physical Exam   Temp: 97.8  F (36.6  C) Temp src: Oral BP: 155/82   Heart Rate: 82 Resp: 18 SpO2: 96 % O2 Device: None (Room air)    Vitals:    08/17/18 0300 08/18/18 0615 08/19/18 0208   Weight: 117.6 kg (259 lb 3.2 oz) 115.7 kg (255 lb) 117 kg (258 lb)     Vital Signs with Ranges  Temp:  [96.3  F (35.7  C)-97.8  F (36.6  C)] 97.8  F (36.6  C)  Heart Rate:  [65-82] 82  Resp:  [18] 18  BP: (136-161)/(59-82) 155/82  SpO2:  [92 %-96 %] 96 %  I/O last 3 completed shifts:  In: 880 [P.O.:880]  Out: -     Constitutional: Awake, alert, cooperative, no apparent distress, obese   Respiratory: bilateral basilar  crackles+  Cardiovascular: Regular rate and rhythm, normal S1 and S2, and no murmur noted  GI: Normal bowel sounds, soft, non-distended, non-tender  Skin/Integumen: bilateral lower extremity edema  Non pitting+, right lower extremity erythematous, ulcer x 2  +  Neuro : moving all 4 extremities, no focal deficit noted     Medications       sodium chloride 0.9%  1,000 mL Intravenous Once     amLODIPine  10 mg Oral Daily     aspirin  325 mg Oral Daily     atorvastatin (LIPITOR) tablet 10 mg  10 mg Oral Daily     citalopram  10 mg Oral Daily     guanFACINE  2 mg Oral Daily     heparin  5,000 Units Subcutaneous Q12H     hydrALAZINE  10 mg Oral TID     insulin aspart  5 Units Subcutaneous TID w/meals     insulin aspart  1-7 Units Subcutaneous TID AC     insulin aspart  1-5 Units Subcutaneous At Bedtime     insulin glargine  30 Units Subcutaneous At Bedtime     nystatin   Topical BID     piperacillin-tazobactam  3.375 g Intravenous Q6H     senna-docusate  1 tablet Oral BID    Or     senna-docusate  2 tablet Oral BID       Data     Recent Labs  Lab 08/19/18  0750 08/18/18  1156 08/17/18  0722 08/16/18  0730 08/15/18  2200   WBC  --   --  11.8*  --  16.2*   HGB  --   --  8.7*  --  10.0*   MCV  --   --  87  --  87     --  227 287 288    141 141  --  138   POTASSIUM 4.7 4.5 4.3  --  5.0   CHLORIDE 109 109 110*  --  108   CO2 22 22 22  --  20   BUN 42* 38* 32*  --  29   CR 2.21* 2.10* 1.86*  --  1.40*   ANIONGAP 8 10 9  --  10   IDANIA 8.5 8.4* 8.4*  --  8.7   * 120* 110*  --  120*   ALBUMIN  --   --   --   --  2.3*   PROTTOTAL  --   --   --   --  7.1   BILITOTAL  --   --   --   --  0.4   ALKPHOS  --   --   --   --  215*   ALT  --   --   --   --  20   AST  --   --   --   --  23       Recent Labs  Lab 08/19/18  0805 08/19/18  0750 08/19/18  0205 08/18/18  2105 08/18/18  1656 08/18/18  1156 08/18/18  1130  08/17/18  0722  08/15/18  2200   GLC  --  134*  --   --   --  120*  --   --  110*  --  120*   *  --   129* 161* 154*  --  116*  < >  --   < >  --    < > = values in this interval not displayed.    Imaging:   No results found for this or any previous visit (from the past 24 hour(s)).

## 2018-08-19 NOTE — PROGRESS NOTES
SW: Received phone call from Sarah at [facility] wanting update on pt. Pt resides in LTC. Possible discharge Monday 8/20. Sarah can be reached at [phone].    [facility]

## 2018-08-19 NOTE — PLAN OF CARE
"Problem: Patient Care Overview  Goal: Plan of Care/Patient Progress Review  Outcome: No Change  A/ox4, forgetful at times. VSS on RA, stating in mid 90's. LS diminished, denies EID or shortness of breath at rest. C/o RLE pain at times, PRN Tylenol given x1, patient stating pain is \"improving and feels better today\".  RLE pink, blotchy and edemauts +3, LLE edematous +2. Encouraged leg elevated but patient refusing to elevate while in chair and refusing to lie in bed with pillows. RLE wound care completed this morning per WOC orders - dressing CDI. Coccyx red and blanchable. Creatinine 2.21 this morning - MD notified and orders to give 1,000cc bolus over 1 hour, recheck level with morning labs. Incontinent of bladder this shift. Up with SBA with walker and gait belt. New PIV placed in L forearm, currently SL with intermittent abx. Discharge pending kidney function. SW consulted. Continue to monitor.            "

## 2018-08-19 NOTE — PLAN OF CARE
Problem: Patient Care Overview  Goal: Plan of Care/Patient Progress Review  Outcome: No Change  Pt is A&Ox4 forgetful at times. VSS now on RA in the low 90's. BLE jeremy, red and blotchy. Right leg wound dressing C/D/I. , no insulin coverage needed.Continent of bowel and bladder this shift. Coccyx red and blanchable. No rash under breast or under abdominal skin folds. Up with SBA to BR, voiding adequately.  Discharge pending pt progress. Will continue to observe.

## 2018-08-19 NOTE — PLAN OF CARE
Problem: Skin and Soft Tissue Infection (Adult)  Goal: Signs and Symptoms of Listed Potential Problems Will be Absent, Minimized or Managed (Skin and Soft Tissue Infection)  Signs and symptoms of listed potential problems will be absent, minimized or managed by discharge/transition of care (reference Skin and Soft Tissue Infection (Adult) CPG).   A/ox4, forgetful at times. VSS on RA, stating in mid 90's. LS diminished, denies EID or shortness of breath at rest. Blood sugars were 139 and 137 this evening, insulin given per MAR. Denied having any pain this evening.  RLE pink, blotchy and edemauts +3, LLE edematous +2. Encouraged leg elevated but patient refusing to elevate while in chair and refusing to lie in bed with pillows. RLE wound dressing clean, dry and intact. Coccyx red and blanchable.  Up with SBA with walker and gait belt. Left IV saline locked. Discharge pending kidney function. SW consulted. Continue to monitor.

## 2018-08-19 NOTE — PLAN OF CARE
Problem: Patient Care Overview  Goal: Plan of Care/Patient Progress Review  Outcome: No Change   A/ox4, forgetful at times, VSS now on RA in 90's. Tylenol for leg pain. Up with SBA to BR, voiding. [place] diet, fluids encouraged.  Up to chair for meal. Creat recheck in the a.m., urine sample still needed.

## 2018-08-19 NOTE — PROGRESS NOTES
MD Notification    Notified Person: MD    Notified Person Name: Dr. Conner    Notification Date/Time: 8/19/18 0828    Notification Interaction: Web base page    Purpose of Notification: Creatinine elevated to 2.21 from 2.10 yesterday    Orders Received:  Orders placed by MD - 1000cc bolus over 1 hour    Comments:

## 2018-08-19 NOTE — PROGRESS NOTES
MD Notification    Notified Person: MD    Notified Person Name: Dr. Conner    Notification Date/Time: 8/19/148 0491    Notification Interaction: Telephone with readback    Purpose of Notification: Need lymph consult ordered, currently only PT not PT w/ lymph    Orders Received: Please place Lymph consult order    Comments:

## 2018-08-20 VITALS
TEMPERATURE: 96.5 F | OXYGEN SATURATION: 97 % | RESPIRATION RATE: 16 BRPM | WEIGHT: 259 LBS | SYSTOLIC BLOOD PRESSURE: 153 MMHG | BODY MASS INDEX: 45.89 KG/M2 | DIASTOLIC BLOOD PRESSURE: 63 MMHG | HEIGHT: 63 IN

## 2018-08-20 LAB
ANION GAP SERPL CALCULATED.3IONS-SCNC: 9 MMOL/L (ref 3–14)
BUN SERPL-MCNC: 42 MG/DL (ref 7–30)
CALCIUM SERPL-MCNC: 8.1 MG/DL (ref 8.5–10.1)
CHLORIDE SERPL-SCNC: 110 MMOL/L (ref 94–109)
CO2 SERPL-SCNC: 19 MMOL/L (ref 20–32)
CREAT SERPL-MCNC: 2.06 MG/DL (ref 0.52–1.04)
ERYTHROCYTE [DISTWIDTH] IN BLOOD BY AUTOMATED COUNT: 15.3 % (ref 10–15)
GFR SERPL CREATININE-BSD FRML MDRD: 24 ML/MIN/1.7M2
GLUCOSE BLDC GLUCOMTR-MCNC: 116 MG/DL (ref 70–99)
GLUCOSE BLDC GLUCOMTR-MCNC: 148 MG/DL (ref 70–99)
GLUCOSE SERPL-MCNC: 119 MG/DL (ref 70–99)
HCT VFR BLD AUTO: 28.9 % (ref 35–47)
HGB BLD-MCNC: 9.3 G/DL (ref 11.7–15.7)
MCH RBC QN AUTO: 27.9 PG (ref 26.5–33)
MCHC RBC AUTO-ENTMCNC: 32.2 G/DL (ref 31.5–36.5)
MCV RBC AUTO: 87 FL (ref 78–100)
PLATELET # BLD AUTO: 312 10E9/L (ref 150–450)
POTASSIUM SERPL-SCNC: 4.9 MMOL/L (ref 3.4–5.3)
RBC # BLD AUTO: 3.33 10E12/L (ref 3.8–5.2)
SODIUM SERPL-SCNC: 138 MMOL/L (ref 133–144)
WBC # BLD AUTO: 7.6 10E9/L (ref 4–11)

## 2018-08-20 PROCEDURE — 00000146 ZZHCL STATISTIC GLUCOSE BY METER IP

## 2018-08-20 PROCEDURE — 85049 AUTOMATED PLATELET COUNT: CPT | Performed by: INTERNAL MEDICINE

## 2018-08-20 PROCEDURE — A9270 NON-COVERED ITEM OR SERVICE: HCPCS | Mod: GY | Performed by: PHYSICIAN ASSISTANT

## 2018-08-20 PROCEDURE — 25000132 ZZH RX MED GY IP 250 OP 250 PS 637: Mod: GY | Performed by: PHYSICIAN ASSISTANT

## 2018-08-20 PROCEDURE — 25000132 ZZH RX MED GY IP 250 OP 250 PS 637: Mod: GY | Performed by: INTERNAL MEDICINE

## 2018-08-20 PROCEDURE — 99239 HOSP IP/OBS DSCHRG MGMT >30: CPT | Performed by: INTERNAL MEDICINE

## 2018-08-20 PROCEDURE — 85027 COMPLETE CBC AUTOMATED: CPT | Performed by: INTERNAL MEDICINE

## 2018-08-20 PROCEDURE — 36415 COLL VENOUS BLD VENIPUNCTURE: CPT | Performed by: INTERNAL MEDICINE

## 2018-08-20 PROCEDURE — 25000128 H RX IP 250 OP 636: Performed by: INTERNAL MEDICINE

## 2018-08-20 PROCEDURE — A9270 NON-COVERED ITEM OR SERVICE: HCPCS | Mod: GY | Performed by: INTERNAL MEDICINE

## 2018-08-20 PROCEDURE — 80048 BASIC METABOLIC PNL TOTAL CA: CPT | Performed by: INTERNAL MEDICINE

## 2018-08-20 PROCEDURE — 40000893 ZZH STATISTIC PT IP EVAL DEFER: Performed by: PHYSICAL THERAPIST

## 2018-08-20 RX ORDER — FUROSEMIDE 40 MG
40 TABLET ORAL 2 TIMES DAILY
Qty: 30 TABLET | DISCHARGE
Start: 2018-08-22

## 2018-08-20 RX ORDER — AMOXICILLIN 250 MG
1 CAPSULE ORAL 2 TIMES DAILY PRN
Qty: 100 TABLET | DISCHARGE
Start: 2018-08-20

## 2018-08-20 RX ORDER — ATORVASTATIN CALCIUM 10 MG/1
10 TABLET, FILM COATED ORAL DAILY
DISCHARGE
Start: 2018-08-21

## 2018-08-20 RX ADMIN — HYDRALAZINE HYDROCHLORIDE 10 MG: 10 TABLET ORAL at 15:38

## 2018-08-20 RX ADMIN — HEPARIN SODIUM 5000 UNITS: 5000 INJECTION, SOLUTION INTRAVENOUS; SUBCUTANEOUS at 04:58

## 2018-08-20 RX ADMIN — PIPERACILLIN SODIUM,TAZOBACTAM SODIUM 2.25 G: 2; .25 INJECTION, POWDER, FOR SOLUTION INTRAVENOUS at 04:58

## 2018-08-20 RX ADMIN — ACETAMINOPHEN 650 MG: 325 TABLET, FILM COATED ORAL at 11:33

## 2018-08-20 RX ADMIN — HEPARIN SODIUM 5000 UNITS: 5000 INJECTION, SOLUTION INTRAVENOUS; SUBCUTANEOUS at 15:37

## 2018-08-20 RX ADMIN — ASPIRIN 325 MG: 325 TABLET, DELAYED RELEASE ORAL at 08:30

## 2018-08-20 RX ADMIN — INSULIN ASPART 5 UNITS: 100 INJECTION, SOLUTION INTRAVENOUS; SUBCUTANEOUS at 08:36

## 2018-08-20 RX ADMIN — ATORVASTATIN CALCIUM 10 MG: 10 TABLET, FILM COATED ORAL at 08:25

## 2018-08-20 RX ADMIN — AMLODIPINE BESYLATE 10 MG: 10 TABLET ORAL at 08:26

## 2018-08-20 RX ADMIN — CITALOPRAM HYDROBROMIDE 10 MG: 10 TABLET ORAL at 08:26

## 2018-08-20 RX ADMIN — HYDRALAZINE HYDROCHLORIDE 10 MG: 10 TABLET ORAL at 08:26

## 2018-08-20 RX ADMIN — INSULIN ASPART 5 UNITS: 100 INJECTION, SOLUTION INTRAVENOUS; SUBCUTANEOUS at 12:03

## 2018-08-20 RX ADMIN — NYSTATIN: 100000 CREAM TOPICAL at 08:37

## 2018-08-20 RX ADMIN — GUANFACINE 2 MG: 1 TABLET ORAL at 08:26

## 2018-08-20 RX ADMIN — PIPERACILLIN SODIUM,TAZOBACTAM SODIUM 2.25 G: 2; .25 INJECTION, POWDER, FOR SOLUTION INTRAVENOUS at 12:04

## 2018-08-20 ASSESSMENT — ACTIVITIES OF DAILY LIVING (ADL)
ADLS_ACUITY_SCORE: 19

## 2018-08-20 NOTE — PLAN OF CARE
Problem: Patient Care Overview  Goal: Plan of Care/Patient Progress Review  Discharge Planner PT   Patient plan for discharge: Back to LTC  Current status: Received orders for lymphadema therapy. Per notes, patient to be discharging possibly as early as today. Will defer lymphadema eval to nursing home as once lymphadema eval completed, then patient needs to be followed for progress and tolerance to wraps.  Noted patient also refusing any other PT services as an inpatient as well. Will complete lymphadema and PT orders.   Barriers to return to prior living situation: none as patient will be returning to LTC  Recommendations for discharge: Back to LTC  Rationale for recommendations: see above.        Entered by: Yajaira Min 08/20/2018 11:31 AM

## 2018-08-20 NOTE — PROGRESS NOTES
SW Note:    D/I:  SW is following for discharge.  Received discharge orders for patient to discharge to long term care.  Patient is able to return to [facility] in [place], [place].  Patient requesting for transportation to be arranged for discharge.  Placed call to [facility] to arrange for wheelchair ride at 4:00 PM.  Placed call to Day[facility] Mu[facility]oz and left  regarding discharge time as listed HCPOA is residing in an [facility].  Updated facility and faxed orders.      P:  SW will continue to follow and assist for discharge.     Andrew Bishop, MSW, LGSW

## 2018-08-20 NOTE — DISCHARGE SUMMARY
[facility]    Discharge Summary  Hospitalist    Date of Admission:  8/15/2018  Date of Discharge:  8/20/2018  Discharging Provider: Ying Conner MD    Discharge Diagnoses   Right lower extremity cellulitis     History of Present Illness    Amber Hull is a 73-year-old female with type 2 diabetes on insulin with a history of hypertension, depression, and chronic kidney disease who presents to [facility] with fever.  The patient currently lives in a nursing home.  Today the patient was noted to have a fever and she has chronic right lower extremity wounds.  She has noticed increased redness and tenderness.  She came to [facility] for further assessment.   In the emergency department, the patient was seen by Dr. Maik Carrasco.  The patient had normal electrolytes, BUN of 29, creatinine 1.40 with calculated GFR 37.  Albumin is 2.3, total protein 7.1, alkaline phosphatase of 215, ALT and AST are normal.  Normal lactic acid level.  White count elevated at 16,200, hemoglobin 10, platelets 288 with a left shift.  Urinalysis has 70 of glucose, specific gravity 1.006, 300 of protein, 1 white cell, 1 red cell.  Blood cultures obtained.  The patient received IV ceftriaxone and is being admitted for cellulitis.   Hospital Course   Amber Hull was admitted on 8/15/2018.  The following problems were addressed during her hospitalization:    Active Problems:    Cellulitis  Sepsis due to right lower extremity cellulitis and pneumonia.    Noted with increased redness, swelling, pain of her chronic LE wounds as well as fever at her NH. Received IV ceftriaxone in the ED. LE US negative for DVT.  Abx Changed to IV Zosyn on admit.  No documented history of MRSA.  Blood culture negative todate , improved with diuresis and antibiotics  Therapy,seen by wound care RN , antibiotics  Changed to oral antibiotics  On discharge .          RLL pneumonia with acute hypoxic respiratory  failure:   Patient requiring 2-3 L of oxygen to maintain O2 saturation above 90% on HD #1.  She was not reported to be hypoxic in the emergency department.  She denies any respiratory symptoms such as cough or shortness of breath.  Temperature max of 101.2 with mild tachycardia. CXR with a RLL PNA.  received  treatment with IV Zosyn which will cover for cellulitis as well as potentially aspiration pneumonia. Currently antibiotics  To oral antibiotics  .        CKD Stage III  Baseline Cr has ranged from 1-1.7 over the last 2 years. Some component of ace-I induced kidney injury was suspected on top of her CKD on her recent stay and this was discontinued. Cr on admission was 1.4 and has trended up to 2.2. UA without signs of infection. Diuretics were held for 48 hours and fluid given, creatinine has trended back to 2, please monitor, labs ordered on discharge .        Paroxysmal A-Fib  Hypertension.   Blood pressures have been quite elevated on admission.  PTA is on amlodipine, hydralazine, lasix and hydrochlorothiazide. Previously on Metoprolol and Lisinopril but discontinued for worsening renal fxn and bradycardia.  Not on AC PTA for her A-Fib with a [id number] of 4 due to fall risk.  Had previously been on Apixaban but was not taking. Continued on full dose aspirin instead. Lasix and hydrochlorothiazide was on hold on discharge due to worsening renal function, start back as possible .      Insulin dependent diabetes.    Blood sugars were controlled during her inpatient stay with adjustments to her home regime, started back on home regime on discharge     # Discharge Pain Plan: - Patient currently has NO PAIN and is not being prescribed pain medications on discharge.    Ying Conner MD    Significant Results and Procedures       Pending Results   These results will be followed up by nursing home physician   Unresulted Labs Ordered in the Past 30 Days of this Admission     Date and Time Order Name Status  Description    8/15/2018 2346 Blood culture Preliminary           Code Status   Full Code       Primary Care Physician   [facility]    Physical Exam   Temp: 96.5  F (35.8  C) Temp src: Oral BP: 175/68   Heart Rate: 63 Resp: 16 SpO2: 97 % O2 Device: None (Room air)    Vitals:    08/18/18 0615 08/19/18 0208 08/20/18 0700   Weight: 115.7 kg (255 lb) 117 kg (258 lb) 117.5 kg (259 lb)     Vital Signs with Ranges  Temp:  [96.5  F (35.8  C)-97.5  F (36.4  C)] 96.5  F (35.8  C)  Heart Rate:  [60-74] 63  Resp:  [16] 16  BP: (149-175)/(56-84) 175/68  SpO2:  [92 %-97 %] 97 %  I/O last 3 completed shifts:  In: 480 [P.O.:480]  Out: 500 [Urine:500]    The patient was examined on the day of discharge.    Discharge Disposition   Discharged to long-term care facility  Condition at discharge: Stable    Consultations This Hospital Stay   OCCUPATIONAL THERAPY ADULT IP CONSULT  PHYSICAL THERAPY ADULT IP CONSULT  WOUND OSTOMY CONTINENCE NURSE  IP CONSULT  CARE TRANSITION RN/SW IP CONSULT  LYMPHEDEMA THERAPY IP CONSULT  PHYSICAL THERAPY ADULT IP CONSULT  OCCUPATIONAL THERAPY ADULT IP CONSULT    Time Spent on this Encounter   IYing, personally saw the patient today and spent greater than 30 minutes discharging this patient.    Discharge Orders     General info for SNF   Length of Stay Estimate: Long Term Care  Condition at Discharge: Improving  Level of care:board and care  Rehabilitation Potential: Fair  Admission H&P remains valid and up-to-date: Yes  Recent Chemotherapy: N/A  Use Nursing Home Standing Orders: Yes     Mantoux instructions   Give two-step Mantoux (PPD) Per Facility Policy Yes     Reason for your hospital stay   Cellulitis from venous stasis/fluid overload     Glucose monitor nursing POCT   Before meals and at bedtime     Intake and output   Every shift     Daily weights   Call Provider for weight gain of more than 2 pounds per day or 5 pounds per week.     Follow Up and recommended labs and tests    Follow up with assisted physician.  The following labs/tests are recommended: basic metabolic panel in 3-4 days, monitor creatinine.     Activity - Up with nursing assistance     Full Code     Physical Therapy Adult Consult   Evaluate and treat as clinically indicated.    Reason:  Lower extremity edema, deconditioning , patient  Will benefit from lymphedema therapy     Occupational Therapy Adult Consult   Evaluate and treat as clinically indicated.    Reason:  Recent hospitalization     Fall precautions     Advance Diet as Tolerated   Follow this diet upon discharge: Orders Placed This Encounter     Room Service     Moderate Consistent CHO Diet       Discharge Medications   Current Discharge Medication List      START taking these medications    Details   amoxicillin-clavulanate (AUGMENTIN) 875-125 MG per tablet Take 1 tablet by mouth 2 times daily  Qty: 20 tablet, Refills: 0    Associated Diagnoses: Cellulitis of lower extremity, unspecified laterality      senna-docusate (SENOKOT-S;PERICOLACE) 8.6-50 MG per tablet Take 1 tablet by mouth 2 times daily as needed for constipation  Qty: 100 tablet    Associated Diagnoses: Slow transit constipation         CONTINUE these medications which have CHANGED    Details   atorvastatin (LIPITOR) 10 MG tablet Take 1 tablet (10 mg) by mouth daily    Associated Diagnoses: Mixed hyperlipidemia      furosemide (LASIX) 40 MG tablet Take 1 tablet (40 mg) by mouth 2 times daily  Qty: 30 tablet    Comments: Start after 2 days  Associated Diagnoses: Hypervolemia, unspecified hypervolemia type         CONTINUE these medications which have NOT CHANGED    Details   acetaminophen (TYLENOL) 325 MG tablet Take 650 mg by mouth 3 times daily as needed for mild pain      amLODIPine (NORVASC) 10 MG tablet Take 1 tablet (10 mg) by mouth daily  Qty: 30 tablet    Associated Diagnoses: Benign essential hypertension      aspirin  MG EC tablet Take 1 tablet (325 mg) by mouth daily  Qty: 40  tablet    Associated Diagnoses: Paroxysmal atrial fibrillation (H)      citalopram (CELEXA) 10 MG tablet Take 10 mg by mouth daily      hydrALAZINE (APRESOLINE) 10 MG tablet Take 1 tablet (10 mg) by mouth 3 times daily  Qty: 60 tablet    Associated Diagnoses: Benign essential hypertension      hydrochlorothiazide (MICROZIDE) 12.5 MG capsule Take 1 capsule (12.5 mg) by mouth daily  Qty: 30 capsule    Associated Diagnoses: Benign essential hypertension      !! insulin aspart (NOVOLOG FLEXPEN) 100 UNIT/ML injection Inject 12 Units Subcutaneous 2 times daily (with meals) Lunch and dinner. Hold if BG<150.      !! insulin aspart (NOVOLOG FLEXPEN) 100 UNIT/ML injection Inject 9 Units Subcutaneous daily (with breakfast) Hold if BG<150      !! nystatin (MYCOSTATIN) cream Apply topically 2 times daily Apply to groin, abdominal folds, and under both breasts.      !! nystatin (MYCOSTATIN) cream Apply topically 2 times daily as needed Apply to groin, abdominal folds, and under both breasts.      guanFACINE (TENEX) 2 MG tablet Take 1 tablet (2 mg) by mouth daily    Associated Diagnoses: Benign essential hypertension      insulin glargine (LANTUS) 100 UNIT/ML injection Inject 37 Units Subcutaneous At Bedtime    Associated Diagnoses: Type 2 diabetes mellitus with diabetic nephropathy, with long-term current use of insulin (H)       !! - Potential duplicate medications found. Please discuss with provider.      STOP taking these medications       polyethylene glycol (MIRALAX/GLYCOLAX) powder Comments:   Reason for Stopping:             Allergies   Allergies   Allergen Reactions     No Known Allergies      Data   Most Recent 3 CBC's:  Recent Labs   Lab Test  08/20/18   0723  08/19/18   0750  08/17/18   0722   08/15/18   2200   WBC  7.6   --   11.8*   --   16.2*   HGB  9.3*   --   8.7*   --   10.0*   MCV  87   --   87   --   87   PLT  312  293  227   < >  288    < > = values in this interval not displayed.      Most Recent 3  BMP's:  Recent Labs   Lab Test  08/20/18   0723  08/19/18   0750  08/18/18   1156   NA  138  139  141   POTASSIUM  4.9  4.7  4.5   CHLORIDE  110*  109  109   CO2  19*  22  22   BUN  42*  42*  38*   CR  2.06*  2.21*  2.10*   ANIONGAP  9  8  10   IDANIA  8.1*  8.5  8.4*   GLC  119*  134*  120*     Most Recent 2 LFT's:  Recent Labs   Lab Test  08/15/18   2200  11/22/16   1740   AST  23  22   ALT  20  27   ALKPHOS  215*  198*   BILITOTAL  0.4  0.4     Most Recent INR's and Anticoagulation Dosing History:  Anticoagulation Dose History     Recent Dosing and Labs Latest Ref Rng & Units 5/28/2016 10/12/2017 10/13/2017    INR 0.86 - 1.14 1.28(H) 0.97 1.04        Most Recent 3 Troponin's:No lab results found.  Most Recent Cholesterol Panel:No lab results found.  Most Recent 6 Bacteria Isolates From Any Culture (See EPIC Reports for Culture Details):  Recent Labs   Lab Test  08/16/18   0035  10/12/17   2131  05/28/16   0610  05/28/16   0535  05/28/16   0516   CULT  No growth after 4 days  50,000 to 100,000 colonies/mL  Citrobacter freundii complex  *  Cultured on the 1st day of incubation: Beta hemolytic Streptococcus group B  Susceptibility testing done on previous specimen  Critical Value/Significant Value, preliminary result only, called to and read   back by Leona Reinoso RN, @ 0044 05.29.2016 BL  *  Cultured on the 1st day of incubation: Beta hemolytic Streptococcus group B  Critical Value/Significant Value, preliminary result only, called to and read   back by  Stefany Lux RN @2207 5/28/16. CT  (Note)  POSITIVE for STREPTOCOCCUS AGALACTIAE (Group B Strep) by [employer] nucleic acid test. Penicillin and ampicillin are drugs of  choice, nonsusceptible isolates have not been reported. Final  identification and antimicrobial susceptibility testing will be  verified by standard methods.    Specimen tested with [employer] multiplex, gram-positive blood culture  nucleic acid test for the following targets: Staph aureus,  Staph  epidermidis, Staph lugdunensis, other Staph species, Enterococcus  faecalis, Enterococcus faecium, Streptococcus species, S. agalactiae,  S. anginosus grp., S. pneumoniae, S. pyogenes, Listeria sp., mecA  (methicillin resistance) and Bobby/B (vancomycin resistance).    Critical Value/Significant Value called to and read back by Leona Reinoso RN, @ 0044 05 .29.2016 BL  *  >100,000 colonies/mL Beta hemolytic Streptococcus group B Group B Streptococci   are susceptible to ampicillin, penicillin, and cefazolin, but may be   erythromycin and/or clindamycin resistant. Contact Microbiology if your patient   is allergic to penicillin and you need erythromycin and/or clindamycin testing   to be performed. Clindamycin and Erythromycin are not routinely prescribed for   isolates from the urinary tract. Susceptibility testing must be requested within   5 days.  10,000 to 50,000 colonies/mL Candida albicans / dubliniensis Candida albicans and   Candida dubliniensis are not routinely speciated Susceptibility testing not   routinely done  *     Most Recent TSH, T4 and A1c Labs:  Recent Labs   Lab Test  08/16/18   0730   A1C  6.9*     Results for orders placed or performed during the hospital encounter of 08/15/18   XR Chest 2 Views    Narrative    XR CHEST 2 VW 8/16/2018 10:11 AM    HISTORY: Hypoxia.    COMPARISON: None.    FINDINGS: Right perihilar consolidation. Background pulmonary vascular  congestion. No pleural effusion or pneumothorax. Normal heart size.      Impression    IMPRESSION: Right lower lobe pneumonia.    MARGE PAGAN MD   US Lower Extremity Venous Duplex Bilateral    Narrative    PROCEDURE:  Venous Doppler ultrasound of the bilateral lower extremities    DATE OF PROCEDURE:  8/16/2018 10:36 AM    CLINICAL HISTORY/INDICATION:   73-year-old female with right lower extremity swelling and redness.    COMPARISON:   None relevant    TECHNIQUE:   Grayscale, color-flow, and spectral waveform analysis were  performed  of the deep veins of the bilateral lower extremities    FINDINGS:   The right common femoral vein, superficial femoral vein and popliteal  vein demonstrate normal compressibility, spectral waveform, color flow  and augmentation.    The left common femoral vein, superficial femoral vein and popliteal  vein demonstrate normal compressibility, spectral waveform, color flow  and augmentation.    The right posterior tibial vein, peroneal vein, and greater saphenous  vein are compressible.    The left posterior tibial and greater saphenous vein are compressible.  The left peroneal vein is not seen.      Impression    IMPRESSION:   1. No evidence of deep venous thrombosis in the right lower extremity  2. No evidence of deep venous thrombosis in the left lower extremity    NO MENON MD

## 2018-08-20 NOTE — PLAN OF CARE
Problem: Patient Care Overview  Goal: Plan of Care/Patient Progress Review  Outcome: No Change  Pt A&Ox4, forgetful at times. IV removed. Discharge paperwork complete for [facility] in [place]. Up with SBA + walker. VSS, denies pain. Pt left unit with transport and personal belongings at 1602.

## 2018-08-20 NOTE — PLAN OF CARE
Problem: Skin and Soft Tissue Infection (Adult)  Goal: Signs and Symptoms of Listed Potential Problems Will be Absent, Minimized or Managed (Skin and Soft Tissue Infection)  Signs and symptoms of listed potential problems will be absent, minimized or managed by discharge/transition of care (reference Skin and Soft Tissue Infection (Adult) CPG).   Outcome: No Change  Patient is A&Ox4, forgetful at times. VSS on room air. EID. Denies pain. RLE with blotchy red areas and edema +3, LLE edema +2. Patient up with SBA and walker to bathroom. Patient had large loose/watery BM overnight. Coccyx blanchable pink, reminder to weight shift. L PIV SL. Calls appropriately, slept in chair overnight.

## 2018-08-20 NOTE — PLAN OF CARE
Problem: Skin and Soft Tissue Infection (Adult)  Goal: Signs and Symptoms of Listed Potential Problems Will be Absent, Minimized or Managed (Skin and Soft Tissue Infection)  Signs and symptoms of listed potential problems will be absent, minimized or managed by discharge/transition of care (reference Skin and Soft Tissue Infection (Adult) CPG).   Outcome: Adequate for Discharge Date Met: 08/20/18   Pt is A/Ox4 but forgetful at times. Up SBA with walker.VSS, Pain in LRE, tylenol given and effective. Dressing change done today. Large incont. Bm in AM, senna held. PIV removed. Creatinine 2.06 and trending down. Pt will discharge this evening-ride scheduled at 1600.

## 2018-08-22 LAB
BACTERIA SPEC CULT: NO GROWTH
Lab: NORMAL
SPECIMEN SOURCE: NORMAL

## 2018-10-04 ENCOUNTER — HOSPITAL ENCOUNTER (EMERGENCY)
Facility: CLINIC | Age: 73
Discharge: HOME | End: 2018-10-04
Attending: EMERGENCY MEDICINE | Admitting: EMERGENCY MEDICINE
Payer: MEDICARE

## 2018-10-04 VITALS
HEIGHT: 63 IN | OXYGEN SATURATION: 93 % | HEART RATE: 90 BPM | TEMPERATURE: 99.5 F | DIASTOLIC BLOOD PRESSURE: 51 MMHG | BODY MASS INDEX: 44.92 KG/M2 | SYSTOLIC BLOOD PRESSURE: 143 MMHG | WEIGHT: 253.53 LBS | RESPIRATION RATE: 23 BRPM

## 2018-10-04 DIAGNOSIS — T14.8XXA BLEEDING FROM WOUND: ICD-10-CM

## 2018-10-04 LAB
ABO + RH BLD: NORMAL
ABO + RH BLD: NORMAL
ANION GAP SERPL CALCULATED.3IONS-SCNC: 7 MMOL/L (ref 3–14)
BASOPHILS # BLD AUTO: 0 10E9/L (ref 0–0.2)
BASOPHILS NFR BLD AUTO: 0.3 %
BLD GP AB SCN SERPL QL: NORMAL
BLOOD BANK CMNT PATIENT-IMP: NORMAL
BUN SERPL-MCNC: 41 MG/DL (ref 7–30)
CALCIUM SERPL-MCNC: 8.8 MG/DL (ref 8.5–10.1)
CHLORIDE SERPL-SCNC: 108 MMOL/L (ref 94–109)
CO2 SERPL-SCNC: 22 MMOL/L (ref 20–32)
CREAT SERPL-MCNC: 1.74 MG/DL (ref 0.52–1.04)
DIFFERENTIAL METHOD BLD: ABNORMAL
EOSINOPHIL # BLD AUTO: 0.5 10E9/L (ref 0–0.7)
EOSINOPHIL NFR BLD AUTO: 3.4 %
ERYTHROCYTE [DISTWIDTH] IN BLOOD BY AUTOMATED COUNT: 15.8 % (ref 10–15)
GFR SERPL CREATININE-BSD FRML MDRD: 29 ML/MIN/1.7M2
GLUCOSE SERPL-MCNC: 91 MG/DL (ref 70–99)
HCT VFR BLD AUTO: 27.8 % (ref 35–47)
HGB BLD-MCNC: 8.9 G/DL (ref 11.7–15.7)
IMM GRANULOCYTES # BLD: 0.1 10E9/L (ref 0–0.4)
IMM GRANULOCYTES NFR BLD: 0.7 %
INR PPP: 1.09 (ref 0.86–1.14)
LACTATE BLD-SCNC: 0.7 MMOL/L (ref 0.7–2)
LYMPHOCYTES # BLD AUTO: 1.9 10E9/L (ref 0.8–5.3)
LYMPHOCYTES NFR BLD AUTO: 13.1 %
MCH RBC QN AUTO: 26.7 PG (ref 26.5–33)
MCHC RBC AUTO-ENTMCNC: 32 G/DL (ref 31.5–36.5)
MCV RBC AUTO: 84 FL (ref 78–100)
MONOCYTES # BLD AUTO: 1 10E9/L (ref 0–1.3)
MONOCYTES NFR BLD AUTO: 7.2 %
NEUTROPHILS # BLD AUTO: 11 10E9/L (ref 1.6–8.3)
NEUTROPHILS NFR BLD AUTO: 75.3 %
NRBC # BLD AUTO: 0 10*3/UL
NRBC BLD AUTO-RTO: 0 /100
PLATELET # BLD AUTO: 379 10E9/L (ref 150–450)
POTASSIUM SERPL-SCNC: 4.9 MMOL/L (ref 3.4–5.3)
RBC # BLD AUTO: 3.33 10E12/L (ref 3.8–5.2)
SODIUM SERPL-SCNC: 137 MMOL/L (ref 133–144)
SPECIMEN EXP DATE BLD: NORMAL
WBC # BLD AUTO: 14.5 10E9/L (ref 4–11)

## 2018-10-04 PROCEDURE — 86900 BLOOD TYPING SEROLOGIC ABO: CPT | Performed by: EMERGENCY MEDICINE

## 2018-10-04 PROCEDURE — 85610 PROTHROMBIN TIME: CPT | Performed by: EMERGENCY MEDICINE

## 2018-10-04 PROCEDURE — 85025 COMPLETE CBC W/AUTO DIFF WBC: CPT | Performed by: EMERGENCY MEDICINE

## 2018-10-04 PROCEDURE — 99283 EMERGENCY DEPT VISIT LOW MDM: CPT

## 2018-10-04 PROCEDURE — 80048 BASIC METABOLIC PNL TOTAL CA: CPT | Performed by: EMERGENCY MEDICINE

## 2018-10-04 PROCEDURE — 86901 BLOOD TYPING SEROLOGIC RH(D): CPT | Performed by: EMERGENCY MEDICINE

## 2018-10-04 PROCEDURE — 83605 ASSAY OF LACTIC ACID: CPT | Performed by: EMERGENCY MEDICINE

## 2018-10-04 PROCEDURE — 86850 RBC ANTIBODY SCREEN: CPT | Performed by: EMERGENCY MEDICINE

## 2018-10-04 ASSESSMENT — ENCOUNTER SYMPTOMS
WOUND: 1
NUMBNESS: 0

## 2018-10-04 NOTE — ED AVS SNAPSHOT
Emergency Department    [address] [place] [id number]    Phone:  [phone]    Fax:  [phone]                                       Amber Hull   MRN: [id number]    Department:   Emergency Department   Date of Visit:  10/4/2018           After Visit Summary Signature Page     I have received my discharge instructions, and my questions have been answered. I have discussed any challenges I see with this plan with the nurse or doctor.    ..........................................................................................................................................  Patient/Patient Representative Signature      ..........................................................................................................................................  Patient Representative Print Name and Relationship to Patient    ..................................................               ................................................  Date                                   Time    ..........................................................................................................................................  Reviewed by Signature/Title    ...................................................              ..............................................  Date                                               Time          22EPIC Rev 08/18

## 2018-10-04 NOTE — ED PROVIDER NOTES
History     Chief Complaint:  Wound Check    The history is provided by the patient and the EMS personnel.      Amber Hull is a 73 year old female with a history of type 2 diabetes mellitus, hypertension, hyperlipidemia, and acute kidney failure who presents to the emergency department via EMS for evaluation of a bleeding wound. Of note, the patient lives in a nursing home. About a week ago, staff noticed a cellulitis like wound on the patient's right lower extremity that they have been cleaning and caring for, and she is apparently currently on antibiotics. Today while redressing the wound, they note that the patient had uncontrolled bleeding from her open wound on the right lower extremity, prompting them to place an at home tourniquet at 1540 and contact EMS. EMS arrived on scene and placed their own tourniquet and transferred the patient to the ED for further evaluation. Here, the patient denies any symptoms aside from her wound, which has stopped bleeding secondary to the placement of the tourniquet. The patient denies any co-occurrence of numbness or tingling in her right foot.  Apparently, the patient has refused dressing changes as well.    Allergies:  NKDA    Medications:    Acetaminophen  Amlodipine  Augmentin  Aspirin 325 mg  Atorvastatin  Citalopram  Furosemide  Guanfacine  Hydralazine  Hydrochlorothiazide  Insulin Aspartate  Insulin Glargine  Nystatin   Senna-Docusate     Past Medical History:    Type 2 Diabetes Mellitus  Endometrial Hyperplasia  Endometriosis   Ovarian Cyst  Septicemia   Obesity  Acute Kidney Failure  Hyperlipidemia  Hypertension  Cellulitis   Paroxysmal Atrial Fibrillation    Past Surgical History:    Hysterectomy  Colonoscopy  Wrist Ganglion Excision  Sigmoidoscopy  Cholecystectomy  Carpal Tunnel Release x2  Lithotripsy    Family History:    Cancer  Heart Disease: Maternal Grandfather  CVA  Asthma  Depression  Alcohol/Drug Dependence    Social History:  Marital Status:   " [4]  Tobacco Use: No  Alcohol Use: No  Review of Systems   Skin: Positive for wound (Right Lower Extremity).   Neurological: Negative for numbness.   All other systems reviewed and are negative.      Physical Exam     Patient Vitals for the past 24 hrs:   BP Temp Temp src Pulse Heart Rate Resp SpO2 Height Weight   10/04/18 1700 143/51 - - - 93 23 93 % - -   10/04/18 1656 135/77 - - - 96 13 94 % - -   10/04/18 1635 177/85 99.5  F (37.5  C) Oral 90 - 16 92 % 1.6 m (5' 3\") 115 kg (253 lb 8.5 oz)       Physical Exam  Nursing note and vitals reviewed.  Constitutional:  Awake and alert. Cooperative.   HENT:   Nose:    Nose normal.   Mouth/Throat:   Mucous membranes are normal.   Eyes:    Conjunctivae normal and EOM are normal.      Pupils are equal, round, and reactive to light.   Neck:    Trachea normal.   Cardiovascular:  Normal rate, regular rhythm, normal heart sounds and normal pulses. No murmur heard.  Pulmonary/Chest:  Effort normal and breath sounds normal.   Abdominal:   Soft. Normal appearance and bowel sounds are normal.      There is no tenderness.      There is no rebound and no CVA tenderness.   Musculoskeletal:  Extremities atraumatic x 4.   Lymphadenopathy:  No cervical adenopathy.   Neurological:   Awake and alert. Normal strength.      No cranial nerve deficit or sensory deficit. GCS eye subscore is 4. GCS verbal subscore is 5. GCS motor subscore is 6.   Skin:    Edema to both lower legs with a small open wound to the right calf, but no active bleeding.  Psychiatric:   Normal mood and affect.      Emergency Department Course     Laboratory:  CBC: WBC: 14.5 (H), HGB: 8.9 (L), PLT: 379  BMP: BUN: 41 (H), Creatinine: 1.74 (H), GFR: 29 (L), o/w WNL   Lactic Acid: 0.7  INR: 1.09  ABO/Rh Screen: Pending    Emergency Department Course:  1640 Nursing notes and vitals reviewed. I performed an exam of the patient as documented above.     Blood drawn. This was sent to the lab for further testing, results " above.    1705 I consulted with Dr. Ley of general surgery, who came down into the ED to evaluate the patient.     1753 I rechecked the patient and discussed the results of her workup thus far.     Findings and plan explained to the patient. Patient discharged home with instructions regarding supportive care, medications, and reasons to return. The importance of close follow-up was reviewed.     I personally reviewed the laboratory results with the patient and answered all related questions prior to discharge.    Impression & Plan      Medical Decision Making:  This is a 73-year-old female who was sent in for bleeding from a leg wound from her nursing home.  A tourniquet had been on for about 1 hour when I saw her and I removed that without any resumption of the bleeding.  This appears to be a likely chronic wound from all the edema that she has.  There is not appear to be any arterial bleeding.  I suspect that when they removed the dressing today, that they likely removed a scab.  I had 1 of our general surgeons, Dr. Ley, come and see the patient as well.  He is in agreement with that assessment.  He recommended antibiotic ointment as well as Adaptic and a dressing.  He also recommended follow-up with the wound Peterman, and I placed that referral in her discharge instructions.  She had a dressing applied here as well.  Her hemoglobin is stable, and her vital signs are normal as well.  Therefore we are going to return her to her nursing home.    Diagnosis:    ICD-10-CM    1. Bleeding from wound T14.8XXA ABO/Rh type and screen       Disposition:  discharged to home    Scribe Disclosure:  I, Iker Alcala, am serving as a scribe on 10/4/2018 at 4:41 PM to personally document services performed by Karan Casarez MD based on my observations and the provider's statements to me.     Iker Alcala  10/4/2018    EMERGENCY DEPARTMENT       Karan Casarez MD  10/04/18 3484

## 2018-10-04 NOTE — ED NOTES
Bed: [facility]  Expected date: 10/4/18  Expected time: 4:09 PM  Means of arrival: Ambulance  Comments:  422 73f leg bleed with tourniquet  ETA 1611

## 2018-10-04 NOTE — ED AVS SNAPSHOT
Emergency Department    [address] [facility] [address]    Phone:  [phone]    Fax:  [phone]                                       Amber Hull   MRN: [id number]    Department:   Emergency Department   Date of Visit:  10/4/2018           Patient Information     Date Of Birth          1945        Your diagnoses for this visit were:     Bleeding from wound        You were seen by Karan Casarez MD.      Follow-up Information     Schedule an appointment as soon as possible for a visit with [facility].    Specialty:  Wound Care    Why:  or call [phone]    Contact information:    [address] [place] [address]  [facility] [address] [phone]    Additional information:    [facility] is located at [address] [place] [address]. So. STEPHENIE Taylor        Schedule an appointment as soon as possible for a visit with Clinic, Karel Taylor.    Contact information:    [address] [facility] [address] [phone]          Follow up with  Emergency Department.    Specialty:  EMERGENCY MEDICINE    Why:  If symptoms worsen    Contact information:    [address] [facility] [address] [phone]      Discharge References/Attachments     WOUND CARE (ENGLISH)      24 Hour Appointment Hotline       To make an appointment at any [facility], call [phone] ([phone]). If you don't have a family doctor or clinic, we will help you find one. [facility] clinics are conveniently located to serve the needs of you and your family.             Review of your medicines      Our records show that you are taking the medicines listed below. If these are incorrect, please call your family doctor or clinic.        Dose / Directions Last dose taken    acetaminophen 325 MG tablet   Commonly known as:  TYLENOL   Dose:  650 mg        Take 650 mg by mouth 3 times daily as needed for mild pain   Refills:  0        amLODIPine  10 MG tablet   Commonly known as:  NORVASC   Dose:  10 mg   Quantity:  30 tablet        Take 1 tablet (10 mg) by mouth daily   Refills:  0        amoxicillin-clavulanate 875-125 MG per tablet   Commonly known as:  AUGMENTIN   Dose:  1 tablet   Quantity:  20 tablet        Take 1 tablet by mouth 2 times daily   Refills:  0        aspirin 325 MG EC tablet   Dose:  325 mg   Quantity:  40 tablet        Take 1 tablet (325 mg) by mouth daily   Refills:  0        atorvastatin 10 MG tablet   Commonly known as:  LIPITOR   Dose:  10 mg        Take 1 tablet (10 mg) by mouth daily   Refills:  0        citalopram 10 MG tablet   Commonly known as:  celeXA   Dose:  10 mg        Take 10 mg by mouth daily   Refills:  0        furosemide 40 MG tablet   Commonly known as:  LASIX   Dose:  40 mg   Quantity:  30 tablet        Take 1 tablet (40 mg) by mouth 2 times daily   Refills:  0        guanFACINE 2 MG tablet   Commonly known as:  TENEX   Dose:  2 mg        Take 1 tablet (2 mg) by mouth daily   Refills:  0        hydrALAZINE 10 MG tablet   Commonly known as:  APRESOLINE   Dose:  10 mg   Quantity:  60 tablet        Take 1 tablet (10 mg) by mouth 3 times daily   Refills:  0        hydrochlorothiazide 12.5 MG capsule   Commonly known as:  MICROZIDE   Dose:  12.5 mg   Quantity:  30 capsule        Take 1 capsule (12.5 mg) by mouth daily   Refills:  0        insulin glargine 100 UNIT/ML injection   Commonly known as:  LANTUS   Dose:  37 Units        Inject 37 Units Subcutaneous At Bedtime   Refills:  0        * NovoLOG FLEXPEN 100 UNIT/ML injection   Dose:  12 Units   Generic drug:  insulin aspart        Inject 12 Units Subcutaneous 2 times daily (with meals) Lunch and dinner. Hold if BG<150.   Refills:  0        * NovoLOG FLEXPEN 100 UNIT/ML injection   Dose:  9 Units   Generic drug:  insulin aspart        Inject 9 Units Subcutaneous daily (with breakfast) Hold if BG<150   Refills:  0        * nystatin cream   Commonly known as:   MYCOSTATIN        Apply topically 2 times daily Apply to groin, abdominal folds, and under both breasts.   Refills:  0        * nystatin cream   Commonly known as:  MYCOSTATIN        Apply topically 2 times daily as needed Apply to groin, abdominal folds, and under both breasts.   Refills:  0        senna-docusate 8.6-50 MG per tablet   Commonly known as:  SENOKOT-S;PERICOLACE   Dose:  1 tablet   Quantity:  100 tablet        Take 1 tablet by mouth 2 times daily as needed for constipation   Refills:  0        * Notice:  This list has 4 medication(s) that are the same as other medications prescribed for you. Read the directions carefully, and ask your doctor or other care provider to review them with you.            Procedures and tests performed during your visit     ABO/Rh type and screen    Basic metabolic panel    CBC with platelets differential    INR    Lactic acid whole blood    Peripheral IV catheter      Orders Needing Specimen Collection     None      Pending Results     Date and Time Order Name Status Description    10/4/2018 1647 ABO/Rh type and screen In process             Pending Culture Results     No orders found from 10/2/2018 to 10/5/2018.            Pending Results Instructions     If you had any lab results that were not finalized at the time of your Discharge, you can call the ED Lab Result RN at [phone]. You will be contacted by this team for any positive Lab results or changes in treatment. The nurses are available 7 days a week from 10A to 6:30P.  You can leave a message 24 hours per day and they will return your call.        Test Results From Your Hospital Stay        10/4/2018  4:56 PM      Component Results     Component Value Ref Range & Units Status    WBC 14.5 (H) 4.0 - 11.0 10e9/L Final    RBC Count 3.33 (L) 3.8 - 5.2 10e12/L Final    Hemoglobin 8.9 (L) 11.7 - 15.7 g/dL Final    Hematocrit 27.8 (L) 35.0 - 47.0 % Final    MCV 84 78 - 100 fl Final    MCH 26.7 26.5 - 33.0 pg Final     MCHC 32.0 31.5 - 36.5 g/dL Final    RDW 15.8 (H) 10.0 - 15.0 % Final    Platelet Count 379 150 - 450 10e9/L Final    Diff Method Automated Method  Final    % Neutrophils 75.3 % Final    % Lymphocytes 13.1 % Final    % Monocytes 7.2 % Final    % Eosinophils 3.4 % Final    % Basophils 0.3 % Final    % Immature Granulocytes 0.7 % Final    Nucleated RBCs 0 0 /100 Final    Absolute Neutrophil 11.0 (H) 1.6 - 8.3 10e9/L Final    Absolute Lymphocytes 1.9 0.8 - 5.3 10e9/L Final    Absolute Monocytes 1.0 0.0 - 1.3 10e9/L Final    Absolute Eosinophils 0.5 0.0 - 0.7 10e9/L Final    Absolute Basophils 0.0 0.0 - 0.2 10e9/L Final    Abs Immature Granulocytes 0.1 0 - 0.4 10e9/L Final    Absolute Nucleated RBC 0.0  Final         10/4/2018  6:27 PM      Component Results     Component Value Ref Range & Units Status    ABO A  Final    RH(D) Pos  Final    Antibody Screen PENDING  Incomplete    Test Valid Only At [facility]     Final    Specimen Expires 10/07/2018  Final         10/4/2018  5:11 PM      Component Results     Component Value Ref Range & Units Status    INR 1.09 0.86 - 1.14 Final         10/4/2018  5:10 PM      Component Results     Component Value Ref Range & Units Status    Sodium 137 133 - 144 mmol/L Final    Potassium 4.9 3.4 - 5.3 mmol/L Final    Chloride 108 94 - 109 mmol/L Final    Carbon Dioxide 22 20 - 32 mmol/L Final    Anion Gap 7 3 - 14 mmol/L Final    Glucose 91 70 - 99 mg/dL Final    Urea Nitrogen 41 (H) 7 - 30 mg/dL Final    Creatinine 1.74 (H) 0.52 - 1.04 mg/dL Final    GFR Estimate 29 (L) >60 mL/min/1.7m2 Final    Non  GFR Calc    GFR Estimate If Black 35 (L) >60 mL/min/1.7m2 Final    African American GFR Calc    Calcium 8.8 8.5 - 10.1 mg/dL Final         10/4/2018  4:57 PM      Component Results     Component Value Ref Range & Units Status    Lactic Acid 0.7 0.7 - 2.0 mmol/L Final                Clinical Quality Measure: Blood Pressure Screening     Your blood pressure was  "checked while you were in the emergency department today. The last reading we obtained was  BP: 143/51 . Please read the guidelines below about what these numbers mean and what you should do about them.  If your systolic blood pressure (the top number) is less than 120 and your diastolic blood pressure (the bottom number) is less than 80, then your blood pressure is normal. There is nothing more that you need to do about it.  If your systolic blood pressure (the top number) is 120-139 or your diastolic blood pressure (the bottom number) is 80-89, your blood pressure may be higher than it should be. You should have your blood pressure rechecked within a year by a primary care provider.  If your systolic blood pressure (the top number) is 140 or greater or your diastolic blood pressure (the bottom number) is 90 or greater, you may have high blood pressure. High blood pressure is treatable, but if left untreated over time it can put you at risk for heart attack, stroke, or kidney failure. You should have your blood pressure rechecked by a primary care provider within the next 4 weeks.  If your provider in the emergency department today gave you specific instructions to follow-up with your doctor or provider even sooner than that, you should follow that instruction and not wait for up to 4 weeks for your follow-up visit.        Thank you for choosing [facility]       Thank you for choosing [facility] for your care. Our goal is always to provide you with excellent care. Hearing back from our patients is one way we can continue to improve our services. Please take a few minutes to complete the written survey that you may receive in the mail after you visit with us. Thank you!        [employer] Information     [email or web address] lets you send messages to your doctor, view your test results, renew your prescriptions, schedule appointments and more. To sign up, go to [email or web address] . Click on \"Log in\" on the left side of the screen, " "which will take you to the Welcome page. Then click on \"Sign up Now\" on the right side of the page.     You will be asked to enter the access code listed below, as well as some personal information. Please follow the directions to create your username and password.     Your access code is: [email or web address]  Expires: 2018 11:55 AM     Your access code will  in 90 days. If you need help or a new code, please call your [facility] clinic or [phone].        Care EveryWhere ID     This is your Care EveryWhere ID. This could be used by other organizations to access your [facility] medical records  [id number]        Equal Access to Services     LEIDY UMANA : Fabiana Pagan, manoj cottrell, angel williamson, gregg baker. So [facility] [phone].    ATENCIÓN: Si habla español, tiene a crowell disposición servicios gratuitos de asistencia lingüística. Llame al [phone].    We comply with applicable federal civil rights laws and [place] laws. We do not discriminate on the basis of race, color, national origin, age, disability, sex, sexual orientation, or gender identity.            After Visit Summary       This is your record. Keep this with you and show to your community pharmacist(s) and doctor(s) at your next visit.                  "

## 2018-10-11 ENCOUNTER — HOSPITAL ENCOUNTER (OUTPATIENT)
Dept: WOUND CARE | Facility: CLINIC | Age: 73
Discharge: HOME | End: 2018-10-11
Attending: PHYSICIAN ASSISTANT | Admitting: PHYSICIAN ASSISTANT
Payer: MEDICARE

## 2018-10-11 VITALS
SYSTOLIC BLOOD PRESSURE: 147 MMHG | DIASTOLIC BLOOD PRESSURE: 71 MMHG | HEIGHT: 63 IN | WEIGHT: 253.53 LBS | BODY MASS INDEX: 44.92 KG/M2 | TEMPERATURE: 97.2 F | HEART RATE: 76 BPM | RESPIRATION RATE: 18 BRPM

## 2018-10-11 DIAGNOSIS — L97.912 LEG ULCER, RIGHT, WITH FAT LAYER EXPOSED (H): Primary | ICD-10-CM

## 2018-10-11 PROCEDURE — G0463 HOSPITAL OUTPT CLINIC VISIT: HCPCS | Mod: 25

## 2018-10-11 PROCEDURE — 99203 OFFICE O/P NEW LOW 30 MIN: CPT | Performed by: PHYSICIAN ASSISTANT

## 2018-10-11 PROCEDURE — 97602 WOUND(S) CARE NON-SELECTIVE: CPT

## 2018-10-11 RX ORDER — CEPHALEXIN 500 MG/1
500 TABLET ORAL 4 TIMES DAILY
COMMUNITY

## 2018-10-11 RX ORDER — CLONIDINE HYDROCHLORIDE 0.1 MG/1
TABLET ORAL
COMMUNITY
Start: 2018-10-07

## 2018-10-11 NOTE — MR AVS SNAPSHOT
MRN:[id number]                      After Visit Summary   10/11/2018    Amber Hull    MRN: [id number]           Visit Information        Provider Department      10/11/2018  8:45 AM Julia Troncoso PA-C [facility]          Further instructions from your care team             [facility]  [address] [facility] [address], [place] [address]  Appointment Phone [phone] Nurse Advisors [phone]    Amber Hull      1945    Wound Dressing Change:Right lower leg wounds  Cleanse wound with: wound cleanser or saline  Skin Care: Apply moisturizing cream to skin surrounding the wound as needed  Cover wound with Santyl (as thick as nickel)  Cover wound with ABD pad and gauze  Change dressing daily.    SpandaGrip F should be applied to both legs and left on all the time except for bathing and dressing changes. This is an extremely important part of her wound therapy.     Julia Troncoso PA-C. October 11, 2018    Call us at [phone] if you have any questions about your wounds, have redness or swelling around your wound, have a fever of 101 or greater or if you have any other problems or concerns. We answer the phone Monday through Friday 8 am to 4 pm, please leave a message as we check the voicemail frequently throughout the day.     Follow up with Provider - 2 weeks               Care EveryWhere ID     This is your Care EveryWhere ID. This could be used by other organizations to access your [facility] medical records  [id number]        Equal Access to Services     LEIDY UMANA : Hadii leonidas leroy hadasho Soirvinali, waaxda luqadaha, qaybta kaalmada huanyada, gregg baker. So [facility] [phone].    ATENCIÓN: Si habla español, tiene a crowell disposición servicios gratuitos de asistencia lingüística. Llame al [phone].    We comply with applicable federal civil rights laws and [place]  laws. We do not discriminate on the basis of race, color, national origin, age, disability, sex, sexual orientation, or gender identity.

## 2018-10-11 NOTE — PROGRESS NOTES
Patient arrived for wound care visit. Certified Wound Care Nurse time spent evaluating patient record, completed a full evaluation and documented wound(s) & cristy-wound skin; provided recommendation based on treatment plan. Applied dressing (Santyl, gauze, roll gauze and Spandigrip, reviewed discharge instructions, patient education, and discussed plan of care with appropriate medical team staff members and patient.

## 2018-10-11 NOTE — PROGRESS NOTES
[facility]    HISTORY OF PRESENT ILLNESS: Amber Hull is a 73 year old female who presents today for a significant right lower leg wounds. She has lymphedema of bilateral lower legs, however her right leg is worse than her left. Ms. Hull has scattered ulcerations on her right leg that are very tender to the touch. Was referred to us from the ED where she presented for bleeding from her chronic wounds.     WOUND CARE: HydroFera Blue, ACE bandages for compression    DATE WOUND ACQUIRED: 8/2018 - noted in chart review, she is unsure how long she has had wounds    PAST MEDICAL HISTORY:  has a past medical history of Diabetes (H); Endometrial hyperplasia; Endometriosis, site unspecified; Excessive or frequent menstruation; Ganglion, unspecified; Other and unspecified ovarian cyst; and Septicemia due to group B Streptococcus (H) (5/29/2016).    SOCIAL HISTORY: resides in [facility], has daily dressing changes    MEDICATIONS:   Current Outpatient Prescriptions   Medication     acetaminophen (TYLENOL) 325 MG tablet     amLODIPine (NORVASC) 10 MG tablet     aspirin  MG EC tablet     atorvastatin (LIPITOR) 10 MG tablet     cephalexin 500 MG tablet     citalopram (CELEXA) 10 MG tablet     cloNIDine (CATAPRES) 0.1 MG tablet     furosemide (LASIX) 40 MG tablet     guanFACINE (TENEX) 2 MG tablet     hydrALAZINE (APRESOLINE) 10 MG tablet     hydrochlorothiazide (MICROZIDE) 12.5 MG capsule     insulin aspart (NOVOLOG FLEXPEN) 100 UNIT/ML injection     insulin glargine (LANTUS) 100 UNIT/ML injection     nystatin (MYCOSTATIN) cream     senna-docusate (SENOKOT-S;PERICOLACE) 8.6-50 MG per tablet     No current facility-administered medications for this encounter.        REVIEW OF SYSTEMS:  CONSTITUTIONAL: Denies fevers or acute illness  CARDIOVASCULAR: Denies circulatory issues or previous vascular procedures, denies DVT  MSK: denies fractures/surgery/joint replacements in [place]    VITALS: /71   "Pulse 76  Temp 97.2  F (36.2  C) (Temporal)  Resp 18  Ht 1.6 m (5' 3\")  Wt 115 kg (253 lb 8.5 oz)  BMI 44.91 kg/m2    PHYSICAL EXAM:  GENERAL: Patient is alert and oriented and in no acute distress  INTEGUMENTARY:   WOUND ASSESSMENT #1:     Location: right lateral lower leg     Size: 12.7 cm x 6.0 cm with a depth of 0.4 cm    Drainage: copious amount of serosanguinous drainage    Wound description: thick layer of yellow slough and eschar  WOUND ASSESSMENT #2:     Location: right medial lower leg     Size: 10.1 cm x 23 cm with a depth of 0.3 cm    Drainage: copious amount of serosanguinous drainage    Wound description: thick layer of yellow slough and eschar          PROCEDURE: Topical 4% lidocaine was applied to the wound. The wound was mechanically debrided. A 15 blade was used to score eschar. Patient refused any further debridement, although this was recommended.     ASSESSMENT: full-thickness, lymphedema ulcerations of right lower leg     PLAN:   1. Primary dressing: Santyl; Secondary dressing: ABD pads, roll gauze  2. SpandaGrip F bilaterally    FOLLOW-UP: 2 weeks    AZ HENDRICKS PA-C (DYKSTRA)    "

## 2018-10-11 NOTE — DISCHARGE INSTRUCTIONS
[facility]  [address] [facility] [address] [address]  Appointment Phone [phone] Nurse Advisors [phone]    Amber Hull      1945    Wound Dressing Change:Right lower leg wounds  Cleanse wound with: wound cleanser or saline  Skin Care: Apply moisturizing cream to skin surrounding the wound as needed  Cover wound with Santyl (as thick as nickel)  Cover wound with ABD pad and gauze  Change dressing daily.    SpandaGrip F should be applied to both legs and left on all the time except for bathing and dressing changes. This is an extremely important part of her wound therapy.     Julia Troncoso PA-C. October 11, 2018    Call us at [phone] if you have any questions about your wounds, have redness or swelling around your wound, have a fever of 101 or greater or if you have any other problems or concerns. We answer the phone Monday through Friday 8 am to 4 pm, please leave a message as we check the voicemail frequently throughout the day.     Follow up with Provider - 2 weeks

## 2018-10-24 ENCOUNTER — HOSPITAL ENCOUNTER (OUTPATIENT)
Dept: WOUND CARE | Facility: CLINIC | Age: 73
Discharge: HOME | End: 2018-10-24
Attending: PHYSICIAN ASSISTANT | Admitting: PHYSICIAN ASSISTANT
Payer: MEDICARE

## 2018-10-24 ENCOUNTER — DOCUMENTATION ONLY (OUTPATIENT)
Dept: OTHER | Facility: CLINIC | Age: 73
End: 2018-10-24

## 2018-10-24 VITALS
TEMPERATURE: 97.5 F | HEART RATE: 99 BPM | SYSTOLIC BLOOD PRESSURE: 162 MMHG | DIASTOLIC BLOOD PRESSURE: 78 MMHG | RESPIRATION RATE: 18 BRPM

## 2018-10-24 DIAGNOSIS — I83.019 VENOUS ULCER OF RIGHT LEG (H): ICD-10-CM

## 2018-10-24 DIAGNOSIS — L97.919 VENOUS ULCER OF RIGHT LEG (H): ICD-10-CM

## 2018-10-24 PROCEDURE — 11045 DBRDMT SUBQ TISS EACH ADDL: CPT | Performed by: PHYSICIAN ASSISTANT

## 2018-10-24 PROCEDURE — A6260 WOUND CLEANSER ANY TYPE/SIZE: HCPCS

## 2018-10-24 PROCEDURE — 11042 DBRDMT SUBQ TIS 1ST 20SQCM/<: CPT

## 2018-10-24 PROCEDURE — 11042 DBRDMT SUBQ TIS 1ST 20SQCM/<: CPT | Performed by: PHYSICIAN ASSISTANT

## 2018-10-24 PROCEDURE — 11045 DBRDMT SUBQ TISS EACH ADDL: CPT

## 2018-10-24 NOTE — PROGRESS NOTES
[facility]    HISTORY OF PRESENT ILLNESS: Amber Hull is a 73 year old female who returuns today for a significant right lower leg wounds. She has lymphedema of bilateral lower legs, however her right leg is worse than her left. Ms. Hull has scattered ulcerations on her right leg that are very tender to the touch. Last visit she refused debridement but today is open to this. She is also open to     Her facility reports today that she often refuses dressing changes and suggestions to elevate her legs.     NUTRITION: Poor PO intake per facility    WOUND CARE: Santyl, previously HydroFera Blue, SpandiGrip for compression    DATE WOUND ACQUIRED: 8/2018 - noted in chart review, she is unsure how long she has had wounds    PAST MEDICAL HISTORY:  has a past medical history of Diabetes (H); Endometrial hyperplasia; Endometriosis, site unspecified; Excessive or frequent menstruation; Ganglion, unspecified; Other and unspecified ovarian cyst; and Septicemia due to group B Streptococcus (H) (5/29/2016).    SOCIAL HISTORY: resides in [facility], has daily dressing changes    MEDICATIONS:   Current Outpatient Prescriptions   Medication     acetaminophen (TYLENOL) 325 MG tablet     amLODIPine (NORVASC) 10 MG tablet     aspirin  MG EC tablet     atorvastatin (LIPITOR) 10 MG tablet     cephalexin 500 MG tablet     citalopram (CELEXA) 10 MG tablet     cloNIDine (CATAPRES) 0.1 MG tablet     collagenase (SANTYL) ointment     furosemide (LASIX) 40 MG tablet     guanFACINE (TENEX) 2 MG tablet     hydrALAZINE (APRESOLINE) 10 MG tablet     hydrochlorothiazide (MICROZIDE) 12.5 MG capsule     insulin aspart (NOVOLOG FLEXPEN) 100 UNIT/ML injection     insulin glargine (LANTUS) 100 UNIT/ML injection     nystatin (MYCOSTATIN) cream     senna-docusate (SENOKOT-S;PERICOLACE) 8.6-50 MG per tablet     No current facility-administered medications for this encounter.        REVIEW OF SYSTEMS:  CONSTITUTIONAL:  Denies fevers or acute illness  CARDIOVASCULAR: Denies circulatory issues or previous vascular procedures, denies DVT  MSK: denies fractures/surgery/joint replacements in [place]    VITALS: /78  Pulse 99  Temp 97.5  F (36.4  C) (Temporal)  Resp 18    PHYSICAL EXAM:  GENERAL: Patient is alert and oriented and in no acute distress  INTEGUMENTARY:   WOUND ASSESSMENT #1:     Location: right lateral lower leg     Size: 12.7 cm x 10.0 cm with a depth of 0.3 cm    Drainage: copious amount of serosanguinous drainage    Wound description: thick layer of yellow slough and eschar  WOUND ASSESSMENT #2:     Location: right medial lower leg     Size: 10.0 cm x 15.0 cm with a depth of 0.3 cm    Drainage: copious amount of serosanguinous drainage    Wound description: thick layer of yellow slough and eschar        PROCEDURE: 4% topical lidocaine was applied to the wound by the CMA. Patient was determined to be capable of making their own medical decisions and informed consent was obtained. Using a 15 blade a surgical debridement was performed down to and including subcutaneous tissue of >60cm2 but <80cm2. Hemostasis was achieved with pressure. The patient tolerated the procedure well.      ASSESSMENT: full-thickness, lymphedema ulcerations of right lower leg     PLAN:   1. Add Vashe soaks before dressing changes  2. Primary dressing: Santyl; Secondary dressing: ABD pads, roll gauze  3. SpandaGrip F bilaterally  4. Ordered venous competency US     FOLLOW-UP: 2 weeks    AZ HENDRICKS PA-C (DYKSTRA)

## 2018-10-24 NOTE — MR AVS SNAPSHOT
MRN:[id number]                      After Visit Summary   10/24/2018    Amber Hull    MRN: [id number]           Visit Information        Provider Department      10/24/2018  8:45 AM Julia Troncoso PA-C [facility] [place] [facility]        Your next 10 appointments already scheduled     Nov 07, 2018  9:45 AM CST   Return Visit with Julia Troncoso PA-C   [facility] ([facility])    [address]  [address]  [facility] [address] [phone]                Further instructions from your care team             [facility]  [address] [facility] [address], [facility] [address]  Appointment Phone [phone] Nurse Advisors [phone]     Amber Hull      1945     Wound Dressing Change:Right lower leg wounds  After cleansing with saline or wound cleanser, apply small amount of VASHE on gauze, lay into wound bed, let sit for 10 minutes, remove gauze (do not rinse) then apply dressing.  Skin Care: Apply moisturizing cream to skin surrounding the wound as needed  Cover wound with Santyl (as thick as nickel)  Cover wound with ABD pad and gauze  Change dressing daily.     SpandaGrip F should be applied to both legs and left on all the time except for bathing and dressing changes. This is an extremely important part of her wound therapy.  Please raise your legs above your heart for 30 mins 4 times a day to promote wound healing.       Julia Troncoso PA-C. October 24, 2018     Call us at [phone] if you have any questions about your wounds, have redness or swelling around your wound, have a fever of 101 or greater or if you have any other problems or concerns. We answer the phone Monday through Friday 8 am to 4 pm, please leave a message as we check the voicemail frequently throughout the day.      Follow up with Provider - 2 weeks   q You have been referred to the [facility] office for a venous ultrasound to check the competency of your veins. Please call them at [phone] to schedule an appointment for your ultrasound.         Care EveryWhere ID     This is your Care EveryWhere ID. This could be used by other organizations to access your [place] medical records  [id number]        Equal Access to Services     LEIDY UMANA : Fabiana Pagan, manoj cottrell, gregg stewart. So [facility] [phone].    ATENCIÓN: Si habla español, tiene a crowell disposición servicios gratuitos de asistencia lingüística. Llame al [phone].    We comply with applicable federal civil rights laws and [place] laws. We do not discriminate on the basis of race, color, national origin, age, disability, sex, sexual orientation, or gender identity.

## 2018-10-24 NOTE — PROGRESS NOTES
"Received referral via \"in-basket\", per  guidelines referral forwarded to [facility].    Mariaa Jacob, LATIAN, RN    "

## 2018-10-24 NOTE — DISCHARGE INSTRUCTIONS
[facility]  [address] [facility] [address] [address]  Appointment Phone [phone] Nurse Advisors [phone]     Amber Hull      1945     Wound Dressing Change:Right lower leg wounds  After cleansing with saline or wound cleanser, apply small amount of VASHE on gauze, lay into wound bed, let sit for 10 minutes, remove gauze (do not rinse) then apply dressing.  Skin Care: Apply moisturizing cream to skin surrounding the wound as needed  Cover wound with Santyl (as thick as nickel)  Cover wound with ABD pad and gauze  Change dressing daily.     SpandaGrip F should be applied to both legs and left on all the time except for bathing and dressing changes. This is an extremely important part of her wound therapy.  Please raise your legs above your heart for 30 mins 4 times a day to promote wound healing.       Julia Troncoso PA-C. October 24, 2018     Call us at [phone] if you have any questions about your wounds, have redness or swelling around your wound, have a fever of 101 or greater or if you have any other problems or concerns. We answer the phone Monday through Friday 8 am to 4 pm, please leave a message as we check the voicemail frequently throughout the day.      Follow up with Provider - 2 weeks   q You have been referred to the [facility] office for a venous ultrasound to check the competency of your veins. Please call them at [phone] to schedule an appointment for your ultrasound.

## 2018-11-07 ENCOUNTER — HOSPITAL ENCOUNTER (OUTPATIENT)
Dept: WOUND CARE | Facility: CLINIC | Age: 73
Discharge: HOME | End: 2018-11-07
Attending: PHYSICIAN ASSISTANT | Admitting: PHYSICIAN ASSISTANT
Payer: MEDICARE

## 2018-11-07 VITALS
DIASTOLIC BLOOD PRESSURE: 86 MMHG | HEART RATE: 78 BPM | TEMPERATURE: 97 F | SYSTOLIC BLOOD PRESSURE: 160 MMHG | RESPIRATION RATE: 18 BRPM

## 2018-11-07 DIAGNOSIS — I73.9 PAD (PERIPHERAL ARTERY DISEASE) (H): Primary | ICD-10-CM

## 2018-11-07 DIAGNOSIS — L97.919 VENOUS ULCER OF RIGHT LEG (H): ICD-10-CM

## 2018-11-07 DIAGNOSIS — I83.019 VENOUS ULCER OF RIGHT LEG (H): ICD-10-CM

## 2018-11-07 PROCEDURE — 11045 DBRDMT SUBQ TISS EACH ADDL: CPT | Performed by: PHYSICIAN ASSISTANT

## 2018-11-07 PROCEDURE — 11042 DBRDMT SUBQ TIS 1ST 20SQCM/<: CPT | Performed by: PHYSICIAN ASSISTANT

## 2018-11-07 PROCEDURE — 11042 DBRDMT SUBQ TIS 1ST 20SQCM/<: CPT

## 2018-11-07 PROCEDURE — 11045 DBRDMT SUBQ TISS EACH ADDL: CPT

## 2018-11-07 NOTE — MR AVS SNAPSHOT
MRN:[id number]                      After Visit Summary   11/7/2018    Amber Hull    MRN: [id number]           Visit Information        Provider Department      11/7/2018  9:45 AM Julia Troncoso PA-C [facility]        Your next 10 appointments already scheduled     Nov 21, 2018 10:45 AM CST   Return Visit with Julia Troncoso PA-C   [facility] ([facility])    [address]  [address]  [facility] [address] [phone]            Nov 29, 2018  9:30 AM CST   Ultrasound with  [facility]   [facility]    [address]  [facility] [address] [phone]            Nov 29, 2018 10:45 AM CST   CONSULT with Arturo Mishra MD   [facility]    [address]  [facility] [id number] [phone]                Further instructions from your care team              [facility]  [address], [facility] [address]  Appointment Phone [phone] Nurse Advisors [phone]      Amber Hull      1945   Apply Barrier cream to buttocks with AM and PM cares and with incontinence.   Wound Dressing Change:Right lower leg wounds  After cleansing with saline or wound cleanser, apply small amount of VASHE on gauze, lay into wound bed, let sit for 10 minutes, remove gauze (do not rinse) then apply dressing.  Open up the sheet of Mesalt, cut a single layer of mesalt to the size of the wound, than cover wound with Mesalt   Followed by ABD PADS, Roll gauze or kerlix, secure with tape  Change dressing daily   *If patient doesn't tolerate Mesalt (as it may cause stinging or discomfort) may switch back to Santyl.   SpandaGrip F should be applied to both legs and left on all the time except for  bathing and dressing changes. This is an extremely important part of her wound therapy.  Please raise your legs above your heart for 30 mins 4 times a day to promote wound healing.     Repositioning:    Bed:  Reposition pt MINIMALLY every 1-2 hours in bed to relieve pressure and promote perfusion to tissue. On air overlay or group 2 mattress    Chair:  When up to chair pt should not sit for longer than one hour total before either standing or returning to bed for at least 10 minutes, again to relieve pressure and promote perfusion to tissue.     o Pt should also sit on a ROHO chair cushion when up to the chair.        Julia Troncoso PA-C. November 7, 2018      Call us at [phone] if you have any questions about your wounds, have redness or swelling around your wound, have a fever of 101 or greater or if you have any other problems or concerns. We answer the phone Monday through Friday 8 am to 4 pm, please leave a message as we check the voicemail frequently throughout the day.       Follow up with Provider - 2 weeks  Get JEFFREY prior to next visit Please call [facility] [phone] ([address] [place] to schedule your appointment if you have not heard from them in two business days.    Arrive 15 minutes early.  If you need to cancel or change the appointment please call [facility] [phone] ([address] [place], [place])              Care EveryWhere ID     This is your Care EveryWhere ID. This could be used by other organizations to access your [place] medical records  [id number]        Equal Access to Services     LEIDY UMANA : Hadii leonidas lovingo Soearlene, waaxda luqadaha, qaybta kaalmada gregg williamson. So [facility] [phone].    ATENCIÓN: Si habla español, tiene a crowell disposición servicios gratuitos de asistencia lingüística. Llame al [phone].    We comply with applicable federal civil rights laws and [place] laws. We do  not discriminate on the basis of race, color, national origin, age, disability, sex, sexual orientation, or gender identity.

## 2018-11-07 NOTE — PROGRESS NOTES
[facility]    HISTORY OF PRESENT ILLNESS: Amber Hull is a 73 year old female who returns today for significant right lower leg wounds. She has lymphedema of bilateral lower legs, however her right leg is worse than her left. Ms. Hull has scattered ulcerations on her right leg that are very tender to the touch. Her facility reports today that she often refuses dressing changes and suggestions to elevate her legs.     For the past two visits she has been more motivated to do what is necessary to treat ulcers. Has set up appointment for venous competency US and follow-up at [facility] as we suggested.     NUTRITION: Poor PO intake per facility    WOUND CARE: Santyl, previously HydroFera Blue, SpandiGrip for compression    DATE WOUND ACQUIRED: 8/2018 - noted in chart review, she is unsure how long she has had wounds    PAST MEDICAL HISTORY:  has a past medical history of Diabetes (H); Endometrial hyperplasia; Endometriosis, site unspecified; Excessive or frequent menstruation; Ganglion, unspecified; Other and unspecified ovarian cyst; and Septicemia due to group B Streptococcus (H) (5/29/2016).    SOCIAL HISTORY: resides in [facility], has daily dressing changes    MEDICATIONS:   Current Outpatient Prescriptions   Medication     acetaminophen (TYLENOL) 325 MG tablet     amLODIPine (NORVASC) 10 MG tablet     aspirin  MG EC tablet     atorvastatin (LIPITOR) 10 MG tablet     cephalexin 500 MG tablet     citalopram (CELEXA) 10 MG tablet     cloNIDine (CATAPRES) 0.1 MG tablet     collagenase (SANTYL) ointment     furosemide (LASIX) 40 MG tablet     guanFACINE (TENEX) 2 MG tablet     hydrALAZINE (APRESOLINE) 10 MG tablet     hydrochlorothiazide (MICROZIDE) 12.5 MG capsule     insulin aspart (NOVOLOG FLEXPEN) 100 UNIT/ML injection     insulin glargine (LANTUS) 100 UNIT/ML injection     nystatin (MYCOSTATIN) cream     senna-docusate (SENOKOT-S;PERICOLACE) 8.6-50 MG per tablet     No  current facility-administered medications for this encounter.      VITALS: /86  Pulse 78  Temp 97  F (36.1  C) (Temporal)  Resp 18    PHYSICAL EXAM:  GENERAL: Patient is alert and oriented and in no acute distress, flat affect, was incontinent of urine during the visit  CV: Unable to palpate R pedal pulse, 2+ edema  INTEGUMENTARY:   WOUND ASSESSMENT #1:     Location: right lateral lower leg     Size: 12.0 cm x 9.5 cm with a depth of 0.3 cm    Drainage: copious amount of serosanguinous drainage    Wound description: thick layer of yellow slough and eschar  WOUND ASSESSMENT #2:     Location: right medial lower leg     Size: 13.0 cm x 15.0 cm with a depth of 0.4 cm    Drainage: copious amount of serosanguinous drainage    Wound description: thick layer of yellow slough and eschar        PROCEDURE: 4% topical lidocaine was applied to the wound by the CMA. Patient was determined to be capable of making their own medical decisions and informed consent was obtained. Using a 15 blade a surgical debridement was performed down to and including subcutaneous tissue of >60cm2 but <80cm2. Hemostasis was achieved with pressure. The patient tolerated the procedure well.      ASSESSMENT: full-thickness, lymphedema ulcerations of right lower leg     PLAN:   1. Continue Vashe soaks before dressing changes  2. Switch to MeSalt   3. SpandaGrip F bilaterally  4. Follow-up with [facility] as scheduled  5. JEFFREY to assess arterial status    FOLLOW-UP: 2 weeks    AZ HENDRICKS PA-C (DYKSTRA)

## 2018-11-07 NOTE — DISCHARGE INSTRUCTIONS
[facility]  [address] [facility] [address], [place] [address]  Appointment Phone [phone] Nurse Advisors [phone]      Amber Hull      1945   Apply Barrier cream to buttocks with AM and PM cares and with incontinence.   Wound Dressing Change:Right lower leg wounds  After cleansing with saline or wound cleanser, apply small amount of VASHE on gauze, lay into wound bed, let sit for 10 minutes, remove gauze (do not rinse) then apply dressing.  Open up the sheet of Mesalt, cut a single layer of mesalt to the size of the wound, than cover wound with Mesalt   Followed by ABD PADS, Roll gauze or kerlix, secure with tape  Change dressing daily   *If patient doesn't tolerate Mesalt (as it may cause stinging or discomfort) may switch back to Santyl.   SpandaGrip F should be applied to both legs and left on all the time except for bathing and dressing changes. This is an extremely important part of her wound therapy.  Please raise your legs above your heart for 30 mins 4 times a day to promote wound healing.     Repositioning:    Bed:  Reposition pt MINIMALLY every 1-2 hours in bed to relieve pressure and promote perfusion to tissue. On air overlay or group 2 mattress    Chair:  When up to chair pt should not sit for longer than one hour total before either standing or returning to bed for at least 10 minutes, again to relieve pressure and promote perfusion to tissue.     o Pt should also sit on a ROHO chair cushion when up to the chair.        Julia Troncoso PA-C. November 7, 2018      Call us at [phone] if you have any questions about your wounds, have redness or swelling around your wound, have a fever of 101 or greater or if you have any other problems or concerns. We answer the phone Monday through Friday 8 am to 4 pm, please leave a message as we check the voicemail frequently throughout the day.       Follow up with Provider - 2 weeks  Get JEFFREY prior  to next visit Please call [facility] [phone] ([address] [place]) to schedule your appointment if you have not heard from them in two business days.    Arrive 15 minutes early.  If you need to cancel or change the appointment please call [facility] [phone] ([address] [place])

## 2018-11-09 ENCOUNTER — TELEPHONE (OUTPATIENT)
Dept: WOUND CARE | Facility: CLINIC | Age: 73
End: 2018-11-09

## 2018-11-09 NOTE — TELEPHONE ENCOUNTER
Received call from Lg at nursing facility where Amber resides stating the are unable to order her dressing supplies of Vashe and Mesalt. Unable to find DME order.  Will follow up on Monday for ordering supplies.

## 2018-11-12 NOTE — TELEPHONE ENCOUNTER
Placed call back to Facility, spoke to Lg, he notes they have not been able to get the mesalt dressings from pharmacy. Advised that we do not use a pharmacy to get this type of supply. It would come from the same place they get there gauze or durable medical supplies. He directed me to talk to their HUC for ordering information and spoke to Manager Angel and they will try to order the MeSalt from [employer].

## 2018-11-26 ENCOUNTER — TELEPHONE (OUTPATIENT)
Dept: WOUND CARE | Facility: CLINIC | Age: 73
End: 2018-11-26

## 2018-11-26 NOTE — TELEPHONE ENCOUNTER
Received call that Patient was transitioning to hospice care and should cancel any appointments at wound clinic. We referred her to [facility] will also let them know to cancel appointments.

## 2020-12-22 NOTE — PROGRESS NOTES
DAMIAN  I: DAMIAN received a call from Daymai. Dayami stated that she has appointment with an  in the AM to discuss guardianship/conservatorship for patient. Dayami will follow up with DAMIAN once appointment is complete. DAMIAN left message with Jim Duggan requesting a call back.     P: DAMIAN will continue to follow and assist as needed.    Gabrielle Sesay, MAU   *[phone]     balance training/bed mobility training/gait training/strengthening